# Patient Record
Sex: FEMALE | Race: WHITE | Employment: OTHER | URBAN - NONMETROPOLITAN AREA
[De-identification: names, ages, dates, MRNs, and addresses within clinical notes are randomized per-mention and may not be internally consistent; named-entity substitution may affect disease eponyms.]

---

## 2019-03-21 LAB
CHOLESTEROL, TOTAL: 210 MG/DL
CHOLESTEROL/HDL RATIO: 3.4
CREATININE: 0.8 MG/DL
HDLC SERPL-MCNC: 62 MG/DL (ref 35–70)
LDL CHOLESTEROL CALCULATED: 125 MG/DL (ref 0–160)
POTASSIUM (K+): 4.3
TRIGL SERPL-MCNC: 115 MG/DL
VLDLC SERPL CALC-MCNC: NORMAL MG/DL

## 2019-06-20 ENCOUNTER — OFFICE VISIT (OUTPATIENT)
Dept: FAMILY MEDICINE CLINIC | Age: 74
End: 2019-06-20
Payer: MEDICARE

## 2019-06-20 VITALS
SYSTOLIC BLOOD PRESSURE: 130 MMHG | HEART RATE: 69 BPM | DIASTOLIC BLOOD PRESSURE: 76 MMHG | OXYGEN SATURATION: 98 % | HEIGHT: 65 IN | BODY MASS INDEX: 26.66 KG/M2 | TEMPERATURE: 98.1 F | WEIGHT: 160 LBS

## 2019-06-20 DIAGNOSIS — D64.9 ANEMIA, UNSPECIFIED TYPE: ICD-10-CM

## 2019-06-20 DIAGNOSIS — F32.5 MAJOR DEPRESSIVE DISORDER WITH SINGLE EPISODE, IN REMISSION (HCC): ICD-10-CM

## 2019-06-20 DIAGNOSIS — Z12.39 SCREENING FOR MALIGNANT NEOPLASM OF BREAST: ICD-10-CM

## 2019-06-20 DIAGNOSIS — G25.81 RLS (RESTLESS LEGS SYNDROME): ICD-10-CM

## 2019-06-20 DIAGNOSIS — I38 VHD (VALVULAR HEART DISEASE): ICD-10-CM

## 2019-06-20 DIAGNOSIS — E78.5 HYPERLIPIDEMIA, UNSPECIFIED HYPERLIPIDEMIA TYPE: ICD-10-CM

## 2019-06-20 DIAGNOSIS — K51.90 ULCERATIVE COLITIS WITHOUT COMPLICATIONS, UNSPECIFIED LOCATION (HCC): ICD-10-CM

## 2019-06-20 DIAGNOSIS — I10 ESSENTIAL HYPERTENSION: Primary | ICD-10-CM

## 2019-06-20 PROCEDURE — 99214 OFFICE O/P EST MOD 30 MIN: CPT | Performed by: INTERNAL MEDICINE

## 2019-06-20 RX ORDER — POTASSIUM CHLORIDE 1500 MG/1
20 TABLET, EXTENDED RELEASE ORAL DAILY
Qty: 90 TABLET | Refills: 1 | Status: SHIPPED
Start: 2019-06-20 | End: 2020-03-27 | Stop reason: SDUPTHER

## 2019-06-20 RX ORDER — LISINOPRIL 20 MG/1
20 TABLET ORAL DAILY
COMMUNITY
End: 2019-06-20 | Stop reason: SDUPTHER

## 2019-06-20 RX ORDER — LISINOPRIL 20 MG/1
20 TABLET ORAL DAILY
Qty: 90 TABLET | Refills: 1 | Status: SHIPPED | OUTPATIENT
Start: 2019-06-20 | End: 2019-12-23 | Stop reason: SDUPTHER

## 2019-06-20 RX ORDER — RANITIDINE 150 MG/1
150 TABLET ORAL 2 TIMES DAILY
Qty: 180 TABLET | Refills: 1 | Status: SHIPPED | OUTPATIENT
Start: 2019-06-20 | End: 2019-09-19

## 2019-06-20 RX ORDER — RANITIDINE 150 MG/1
150 TABLET ORAL 2 TIMES DAILY
COMMUNITY
End: 2019-06-20 | Stop reason: SDUPTHER

## 2019-06-20 RX ORDER — ESCITALOPRAM OXALATE 10 MG/1
10 TABLET ORAL DAILY
Qty: 90 TABLET | Refills: 1 | Status: SHIPPED | OUTPATIENT
Start: 2019-06-20 | End: 2019-12-23 | Stop reason: SDUPTHER

## 2019-06-20 RX ORDER — BIOTIN 1 MG
5000 TABLET ORAL
COMMUNITY
End: 2019-09-19

## 2019-06-20 RX ORDER — OXYBUTYNIN CHLORIDE 5 MG/1
5 TABLET ORAL 2 TIMES DAILY
Qty: 180 TABLET | Refills: 1 | Status: SHIPPED
Start: 2019-06-20 | End: 2020-03-27 | Stop reason: SDUPTHER

## 2019-06-20 RX ORDER — IBUPROFEN 200 MG
200 TABLET ORAL EVERY 6 HOURS PRN
COMMUNITY
End: 2019-09-19

## 2019-06-20 RX ORDER — HYDROCHLOROTHIAZIDE 12.5 MG/1
25 CAPSULE, GELATIN COATED ORAL DAILY
Qty: 90 CAPSULE | Refills: 1 | Status: SHIPPED | OUTPATIENT
Start: 2019-06-20 | End: 2019-12-23 | Stop reason: SDUPTHER

## 2019-06-20 RX ORDER — DILTIAZEM HYDROCHLORIDE EXTENDED-RELEASE TABLETS 180 MG/1
180 TABLET, EXTENDED RELEASE ORAL DAILY
Qty: 90 TABLET | Refills: 1 | Status: SHIPPED | OUTPATIENT
Start: 2019-06-20 | End: 2019-12-23 | Stop reason: SDUPTHER

## 2019-06-20 RX ORDER — PRAVASTATIN SODIUM 20 MG
20 TABLET ORAL DAILY
Qty: 90 TABLET | Refills: 1 | Status: SHIPPED
Start: 2019-06-20 | End: 2020-03-27 | Stop reason: SDUPTHER

## 2019-06-20 ASSESSMENT — ENCOUNTER SYMPTOMS
ABDOMINAL PAIN: 0
SINUS PAIN: 0
DIARRHEA: 0
WHEEZING: 0
CONSTIPATION: 0
VOMITING: 0
SHORTNESS OF BREATH: 0
NAUSEA: 0
RHINORRHEA: 0
COUGH: 0
BACK PAIN: 0
BLOOD IN STOOL: 0

## 2019-06-20 ASSESSMENT — PATIENT HEALTH QUESTIONNAIRE - PHQ9
SUM OF ALL RESPONSES TO PHQ QUESTIONS 1-9: 0
SUM OF ALL RESPONSES TO PHQ9 QUESTIONS 1 & 2: 0
1. LITTLE INTEREST OR PLEASURE IN DOING THINGS: 0
2. FEELING DOWN, DEPRESSED OR HOPELESS: 0
SUM OF ALL RESPONSES TO PHQ QUESTIONS 1-9: 0

## 2019-06-20 NOTE — PROGRESS NOTES
[facility]     19  Lizbeth Car : 1945 Sex: female  Age: 75 y.o. Chief Complaint   Patient presents with    Hypertension    Knee Pain   Multiple medical problem follow-up    HPI: Patient presents today for follow-up visit on multiple medical problems. She is up-to-date with GI for her ulcerative colitis and orthopedics. She is status post low back surgery for which she is doing well with. She is having a lot of knee issues and is going to see orthopedics regarding that at some point but states does not wish to have any surgery right now. She does continue to have constipation which is been ongoing problem for her. She is up-to-date with her scoping for her ulcerative colitis. She is also due for EGD upcoming. Blood pressure is been okay. Weight is up about 3 pounds. Review of Systems   Constitutional: Negative for activity change, appetite change, chills, diaphoresis, fatigue, fever and unexpected weight change. HENT: Negative for congestion, postnasal drip, rhinorrhea and sinus pain. Respiratory: Negative for cough, shortness of breath and wheezing. Cardiovascular: Negative for chest pain, palpitations and leg swelling. Gastrointestinal: Negative for abdominal pain, blood in stool, constipation, diarrhea, nausea and vomiting. Endocrine: Negative. Genitourinary: Positive for urgency. Negative for difficulty urinating, dysuria, frequency and hematuria. Musculoskeletal: Positive for arthralgias. Negative for back pain, gait problem and myalgias. Knee pain   Skin: Negative. Allergic/Immunologic: Negative for environmental allergies and immunocompromised state. Neurological: Negative for dizziness, weakness, light-headedness, numbness and headaches. Hematological: Negative. Psychiatric/Behavioral:        Depression has improved. She is on medication. Living with her daughter that is working out well.           REST OF PERTINENT ROS GONE OVER AND WAS NEGATIVE.        Current Outpatient Medications:     ibuprofen (ADVIL;MOTRIN) 200 MG tablet, Take 200 mg by mouth every 6 hours as needed for Pain, Disp: , Rfl:     Biotin 1000 MCG TABS, Take 5,000 mcg by mouth, Disp: , Rfl:     diltiazem (CARDIZEM LA) 180 MG TB24 extended release tablet, Take 1 tablet by mouth daily, Disp: 90 tablet, Rfl: 1    ranitidine (RANITIDINE 150 MAX STRENGTH) 150 MG tablet, Take 1 tablet by mouth 2 times daily, Disp: 180 tablet, Rfl: 1    hydrochlorothiazide (MICROZIDE) 12.5 MG capsule, Take 2 capsules by mouth daily, Disp: 90 capsule, Rfl: 1    lisinopril (PRINIVIL;ZESTRIL) 20 MG tablet, Take 1 tablet by mouth daily, Disp: 90 tablet, Rfl: 1    pravastatin (PRAVACHOL) 20 MG tablet, Take 1 tablet by mouth daily, Disp: 90 tablet, Rfl: 1    KLOR-CON M20 20 MEQ extended release tablet, Take 1 tablet by mouth daily, Disp: 90 tablet, Rfl: 1    oxybutynin (DITROPAN) 5 MG tablet, Take 1 tablet by mouth 2 times daily, Disp: 180 tablet, Rfl: 1    escitalopram (LEXAPRO) 10 MG tablet, Take 1 tablet by mouth daily, Disp: 90 tablet, Rfl: 1    Cholecalciferol (VITAMIN D) 2000 UNITS CAPS capsule, Take 2,000 capsules by mouth daily, Disp: , Rfl:     ALPRAZolam (XANAX) 0.25 MG tablet, Take 0.25 mg by mouth nightly as needed for Sleep, Disp: , Rfl:     nitroGLYCERIN (NITROSTAT) 0.4 MG SL tablet, Place 0.4 mg under the tongue every 5 minutes as needed for Chest pain, Disp: , Rfl:     sulfaSALAzine (AZULFIDINE) 500 MG tablet, Take 500 mg by mouth 4 times daily 2 tablets twice a day, Disp: , Rfl:   Allergies   Allergen Reactions    Erythromycin Hives    Penicillins Hives       Past Medical History:   Diagnosis Date    Anemia     Anemia     Chronic congestion of paranasal sinus     Colon polyps     Depression     Diverticulosis     HTN (hypertension)     Hyperlipidemia 6/20/2019    Irritable bladder     Major depressive disorder with single episode, in remission ([facility] [address] 2019    Osteoarthritis     Overactive bladder     Restless leg syndrome     Restless leg syndrome     RLS (restless legs syndrome) 2019    [facility] [address]     Ul[facility] colitis without complications ([facility]     Vitamin D deficiency     Wrist fracture     Right     Past Surgical History:   Procedure Laterality Date    BLADDER SUSPENSION      BREAST LUMPECTOMY Left     x 2, negative    BUNIONECTOMY      CARDIAC CATHETERIZATION  2012    CARPAL TUNNEL RELEASE Right     CATARACT REMOVAL WITH IMPLANT Right     COLONOSCOPY  2014    HYSTERECTOMY, TOTAL ABDOMINAL      non-cancerous    JOINT REPLACEMENT Bilateral     hip    KNEE ARTHROSCOPY Left     RECTOCELE REPAIR       Family History   Problem Relation Age of Onset    Heart Disease Father          age 80    Diabetes Mother          age 80    Heart Disease Mother         CHF    Other Brother         PVD, obesity    Cancer Paternal Grandmother         breast     Social History     Socioeconomic History    Marital status:      Spouse name: Not on file    Number of children: Not on file    Years of education: Not on file    Highest education level: Not on file   Occupational History    Not on file   Social Needs    Financial resource strain: Not on file    Food insecurity:     Worry: Not on file     Inability: Not on file    Transportation needs:     Medical: Not on file     Non-medical: Not on file   Tobacco Use    Smoking status: Never Smoker    Smokeless tobacco: Never Used   Substance and Sexual Activity    Alcohol use: No     Alcohol/week: 0.0 oz    Drug use: No    Sexual activity: Not on file   Lifestyle    Physical activity:     Days per week: Not on file     Minutes per session: Not on file    Stress: Not on file   Relationships    Social connections:     Talks on phone: Not on file     Gets together: Not on file     Attends Che[employer] service: Not on file     Active member of club or organization: Not on file     Attends meetings of clubs or organizations: Not on file     Relationship status: Not on file    Intimate partner violence:     Fear of current or ex partner: Not on file     Emotionally abused: Not on file     Physically abused: Not on file     Forced sexual activity: Not on file   Other Topics Concern    Not on file   Social History Narrative    Not on file       Vitals:    06/20/19 1042   BP: 130/76   Pulse: 69   Temp: 98.1 °F (36.7 °C)   TempSrc: Temporal   SpO2: 98%   Weight: 160 lb (72.6 kg)   Height: 5' 5\" (1.651 m)       Physical Exam   Constitutional: She is oriented to person, place, and time. She appears well-developed and well-nourished. No distress. Neck: Normal range of motion. Neck supple. No JVD present. No thyromegaly present. Cardiovascular: Normal rate, regular rhythm, normal heart sounds and intact distal pulses. Exam reveals no gallop and no friction rub. No murmur heard. Pulmonary/Chest: Effort normal and breath sounds normal. No respiratory distress. She has no wheezes. She has no rales. Abdominal: Soft. Bowel sounds are normal. She exhibits no distension and no mass. There is no tenderness. Musculoskeletal: Normal range of motion. She exhibits no edema or tenderness. Lymphadenopathy:     She has no cervical adenopathy. Neurological: She is alert and oriented to person, place, and time. She displays normal reflexes. No sensory deficit. She exhibits normal muscle tone. Coordination normal.   Skin: Skin is warm and dry. No rash noted. No erythema. Psychiatric: She has a normal mood and affect. Her behavior is normal.   Nursing note and vitals reviewed. Assessment and Plan:  Katherin Son was seen today for hypertension and knee pain. Diagnoses and all orders for this visit:    Essential hypertension  -     diltiazem (CARDIZEM LA) 180 MG TB24 extended release tablet;  Take 1 tablet by mouth daily  -     hydrochlorothiazide (MICROZIDE) 12.5 MG capsule; Take 2 capsules by mouth daily  -     lisinopril (PRINIVIL;ZESTRIL) 20 MG tablet; Take 1 tablet by mouth daily  -     KLOR-CON M20 20 MEQ extended release tablet; Take 1 tablet by mouth daily  -     oxybutynin (DITROPAN) 5 MG tablet; Take 1 tablet by mouth 2 times daily    Ulcerative colitis without complications, unspecified location ([place] [address].)    Hyperlipidemia, unspecified hyperlipidemia type  -     pravastatin (PRAVACHOL) 20 MG tablet; Take 1 tablet by mouth daily    Major depressive disorder with single episode, in remission ([facility]  -     escitalopram (LEXAPRO) 10 MG tablet; Take 1 tablet by mouth daily    RLS (restless legs syndrome)    Anemia, unspecified type  -     ranitidine (RANITIDINE 150 MAX STRENGTH) 150 MG tablet; Take 1 tablet by mouth 2 times daily    VHD (valvular heart disease)    Screening for malignant neoplasm of breast  -     ADRIANA CAD SCREENING; Future      Plan: I will see her back in 3 months and get fasting blood work on her at that point. Order for mammogram was given. Fall precautions. Follow-up with above consultants GI and orthopedics. Continue to monitor blood pressure closely. She will be due for an EGD this year. This is for Angel's esophagus. Notify us with problems in the interim. increase her Metamucil from daily to twice a day for a while to see if she gets more regular with this. Prescription management performed. No follow-ups on file. Seen By:  Greg Salomon MD      *Document was created using voice recognition software. Note was reviewed however may contain grammatical errors.

## 2019-09-17 RX ORDER — SENNOSIDES 8.6 MG
650 CAPSULE ORAL EVERY 8 HOURS PRN
COMMUNITY
End: 2020-03-27

## 2019-09-19 ENCOUNTER — HOSPITAL ENCOUNTER (OUTPATIENT)
Age: 74
Discharge: HOME | End: 2019-09-21
Payer: MEDICARE

## 2019-09-19 ENCOUNTER — OFFICE VISIT (OUTPATIENT)
Dept: FAMILY MEDICINE CLINIC | Age: 74
End: 2019-09-19
Payer: MEDICARE

## 2019-09-19 VITALS
SYSTOLIC BLOOD PRESSURE: 158 MMHG | HEART RATE: 72 BPM | DIASTOLIC BLOOD PRESSURE: 80 MMHG | BODY MASS INDEX: 26.29 KG/M2 | OXYGEN SATURATION: 98 % | WEIGHT: 158 LBS

## 2019-09-19 DIAGNOSIS — D64.9 ANEMIA, UNSPECIFIED TYPE: ICD-10-CM

## 2019-09-19 DIAGNOSIS — R53.83 FATIGUE, UNSPECIFIED TYPE: ICD-10-CM

## 2019-09-19 DIAGNOSIS — E78.5 HYPERLIPIDEMIA, UNSPECIFIED HYPERLIPIDEMIA TYPE: ICD-10-CM

## 2019-09-19 DIAGNOSIS — Z23 IMMUNIZATION DUE: Primary | ICD-10-CM

## 2019-09-19 DIAGNOSIS — I38 VHD (VALVULAR HEART DISEASE): ICD-10-CM

## 2019-09-19 DIAGNOSIS — G25.81 RLS (RESTLESS LEGS SYNDROME): ICD-10-CM

## 2019-09-19 DIAGNOSIS — K51.90 ULCERATIVE COLITIS WITHOUT COMPLICATIONS, UNSPECIFIED LOCATION (HCC): ICD-10-CM

## 2019-09-19 DIAGNOSIS — I10 ESSENTIAL HYPERTENSION: ICD-10-CM

## 2019-09-19 DIAGNOSIS — F32.5 MAJOR DEPRESSIVE DISORDER WITH SINGLE EPISODE, IN REMISSION (HCC): ICD-10-CM

## 2019-09-19 LAB
ALBUMIN SERPL-MCNC: 4.7 G/DL (ref 3.5–5.2)
ALP BLD-CCNC: 87 U/L (ref 35–104)
ALT SERPL-CCNC: 10 U/L (ref 0–32)
ANION GAP SERPL CALCULATED.3IONS-SCNC: 16 MMOL/L (ref 7–16)
AST SERPL-CCNC: 22 U/L (ref 0–31)
BASOPHILS ABSOLUTE: 0.02 E9/L (ref 0–0.2)
BASOPHILS RELATIVE PERCENT: 0.5 % (ref 0–2)
BILIRUB SERPL-MCNC: <0.2 MG/DL (ref 0–1.2)
BUN BLDV-MCNC: 13 MG/DL (ref 8–23)
CALCIUM SERPL-MCNC: 9.5 MG/DL (ref 8.6–10.2)
CHLORIDE BLD-SCNC: 96 MMOL/L (ref 98–107)
CHOLESTEROL, TOTAL: 200 MG/DL (ref 0–199)
CO2: 25 MMOL/L (ref 22–29)
CREAT SERPL-MCNC: 0.9 MG/DL (ref 0.5–1)
EOSINOPHILS ABSOLUTE: 0.04 E9/L (ref 0.05–0.5)
EOSINOPHILS RELATIVE PERCENT: 1 % (ref 0–6)
GFR AFRICAN AMERICAN: >60
GFR NON-AFRICAN AMERICAN: >60 ML/MIN/1.73
GLUCOSE BLD-MCNC: 88 MG/DL (ref 74–99)
HCT VFR BLD CALC: 36.1 % (ref 34–48)
HDLC SERPL-MCNC: 63 MG/DL
HEMOGLOBIN: 11.8 G/DL (ref 11.5–15.5)
IMMATURE GRANULOCYTES #: 0.01 E9/L
IMMATURE GRANULOCYTES %: 0.3 % (ref 0–5)
LDL CHOLESTEROL CALCULATED: 113 MG/DL (ref 0–99)
LYMPHOCYTES ABSOLUTE: 1.57 E9/L (ref 1.5–4)
LYMPHOCYTES RELATIVE PERCENT: 40.4 % (ref 20–42)
MCH RBC QN AUTO: 32.2 PG (ref 26–35)
MCHC RBC AUTO-ENTMCNC: 32.7 % (ref 32–34.5)
MCV RBC AUTO: 98.6 FL (ref 80–99.9)
MONOCYTES ABSOLUTE: 0.36 E9/L (ref 0.1–0.95)
MONOCYTES RELATIVE PERCENT: 9.3 % (ref 2–12)
NEUTROPHILS ABSOLUTE: 1.89 E9/L (ref 1.8–7.3)
NEUTROPHILS RELATIVE PERCENT: 48.5 % (ref 43–80)
PDW BLD-RTO: 12.6 FL (ref 11.5–15)
PLATELET # BLD: 170 E9/L (ref 130–450)
PMV BLD AUTO: 10.5 FL (ref 7–12)
POTASSIUM SERPL-SCNC: 4.1 MMOL/L (ref 3.5–5)
RBC # BLD: 3.66 E12/L (ref 3.5–5.5)
SODIUM BLD-SCNC: 137 MMOL/L (ref 132–146)
TOTAL PROTEIN: 7.4 G/DL (ref 6.4–8.3)
TRIGL SERPL-MCNC: 121 MG/DL (ref 0–149)
TSH SERPL DL<=0.05 MIU/L-ACNC: 3.08 UIU/ML (ref 0.27–4.2)
VLDLC SERPL CALC-MCNC: 24 MG/DL
WBC # BLD: 3.9 E9/L (ref 4.5–11.5)

## 2019-09-19 PROCEDURE — G0008 ADMIN INFLUENZA VIRUS VAC: HCPCS | Performed by: INTERNAL MEDICINE

## 2019-09-19 PROCEDURE — 80061 LIPID PANEL: CPT

## 2019-09-19 PROCEDURE — 90653 IIV ADJUVANT VACCINE IM: CPT | Performed by: INTERNAL MEDICINE

## 2019-09-19 PROCEDURE — G8427 DOCREV CUR MEDS BY ELIG CLIN: HCPCS | Performed by: INTERNAL MEDICINE

## 2019-09-19 PROCEDURE — G8400 PT W/DXA NO RESULTS DOC: HCPCS | Performed by: INTERNAL MEDICINE

## 2019-09-19 PROCEDURE — 1123F ACP DISCUSS/DSCN MKR DOCD: CPT | Performed by: INTERNAL MEDICINE

## 2019-09-19 PROCEDURE — 85025 COMPLETE CBC W/AUTO DIFF WBC: CPT

## 2019-09-19 PROCEDURE — 1036F TOBACCO NON-USER: CPT | Performed by: INTERNAL MEDICINE

## 2019-09-19 PROCEDURE — 1090F PRES/ABSN URINE INCON ASSESS: CPT | Performed by: INTERNAL MEDICINE

## 2019-09-19 PROCEDURE — 36415 COLL VENOUS BLD VENIPUNCTURE: CPT

## 2019-09-19 PROCEDURE — 84443 ASSAY THYROID STIM HORMONE: CPT

## 2019-09-19 PROCEDURE — 80053 COMPREHEN METABOLIC PANEL: CPT

## 2019-09-19 PROCEDURE — 4040F PNEUMOC VAC/ADMIN/RCVD: CPT | Performed by: INTERNAL MEDICINE

## 2019-09-19 PROCEDURE — G8419 CALC BMI OUT NRM PARAM NOF/U: HCPCS | Performed by: INTERNAL MEDICINE

## 2019-09-19 PROCEDURE — 3017F COLORECTAL CA SCREEN DOC REV: CPT | Performed by: INTERNAL MEDICINE

## 2019-09-19 PROCEDURE — 99214 OFFICE O/P EST MOD 30 MIN: CPT | Performed by: INTERNAL MEDICINE

## 2019-09-19 RX ORDER — POTASSIUM CHLORIDE 1500 MG/1
20 TABLET, FILM COATED, EXTENDED RELEASE ORAL DAILY
COMMUNITY
End: 2019-09-19 | Stop reason: SDUPTHER

## 2019-09-19 RX ORDER — POTASSIUM CHLORIDE 1500 MG/1
20 TABLET, FILM COATED, EXTENDED RELEASE ORAL DAILY
Qty: 90 TABLET | Refills: 1 | Status: SHIPPED
Start: 2019-09-19 | End: 2020-03-27

## 2019-09-21 ENCOUNTER — TELEPHONE (OUTPATIENT)
Dept: FAMILY MEDICINE CLINIC | Age: 74
End: 2019-09-21

## 2019-09-22 NOTE — PROGRESS NOTES
dresser  Personal Habits: Cigarette Use: Does Not Smoke. Alcohol: Denies alcohol use    Current Outpatient Medications:     potassium chloride (KLOR-CON M) 20 MEQ TBCR extended release tablet, Take 1 tablet by mouth daily 1 PO QD -- PLEASE GIVE PT GENERIC INSTEAD OF BRAND, Disp: 90 tablet, Rfl: 1    acetaminophen (TYLENOL) 650 MG extended release tablet, Take 650 mg by mouth every 8 hours as needed for Pain, Disp: , Rfl:     diltiazem (CARDIZEM LA) 180 MG TB24 extended release tablet, Take 1 tablet by mouth daily, Disp: 90 tablet, Rfl: 1    hydrochlorothiazide (MICROZIDE) 12.5 MG capsule, Take 2 capsules by mouth daily, Disp: 90 capsule, Rfl: 1    lisinopril (PRINIVIL;ZESTRIL) 20 MG tablet, Take 1 tablet by mouth daily, Disp: 90 tablet, Rfl: 1    pravastatin (PRAVACHOL) 20 MG tablet, Take 1 tablet by mouth daily, Disp: 90 tablet, Rfl: 1    KLOR-CON M20 20 MEQ extended release tablet, Take 1 tablet by mouth daily, Disp: 90 tablet, Rfl: 1    oxybutynin (DITROPAN) 5 MG tablet, Take 1 tablet by mouth 2 times daily, Disp: 180 tablet, Rfl: 1    escitalopram (LEXAPRO) 10 MG tablet, Take 1 tablet by mouth daily, Disp: 90 tablet, Rfl: 1    Cholecalciferol (VITAMIN D) 2000 UNITS CAPS capsule, Take 2,000 capsules by mouth daily, Disp: , Rfl:     ALPRAZolam (XANAX) 0.25 MG tablet, Take 0.25 mg by mouth nightly as needed for Sleep, Disp: , Rfl:     sulfaSALAzine (AZULFIDINE) 500 MG tablet, Take 1,000 mg by mouth 2 times daily 2 tablets twice a day, Disp: , Rfl:     nitroGLYCERIN (NITROSTAT) 0.4 MG SL tablet, Place 0.4 mg under the tongue every 5 minutes as needed for Chest pain, Disp: , Rfl:   Allergies   Allergen Reactions    Erythromycin Hives    Penicillins Hives       Past Medical History:   Diagnosis Date    Anemia     Anemia     Cholelithiasis     Chronic congestion of paranasal sinus     Colon polyps     Depression     Diastolic dysfunction     Diverticulosis     HTN (hypertension)    

## 2019-10-09 ENCOUNTER — OFFICE VISIT (OUTPATIENT)
Dept: FAMILY MEDICINE CLINIC | Age: 74
End: 2019-10-09
Payer: MEDICARE

## 2019-10-09 VITALS
SYSTOLIC BLOOD PRESSURE: 130 MMHG | WEIGHT: 156 LBS | HEIGHT: 65 IN | DIASTOLIC BLOOD PRESSURE: 70 MMHG | BODY MASS INDEX: 25.99 KG/M2 | OXYGEN SATURATION: 98 % | HEART RATE: 65 BPM | TEMPERATURE: 97.2 F

## 2019-10-09 DIAGNOSIS — E78.5 HYPERLIPIDEMIA, UNSPECIFIED HYPERLIPIDEMIA TYPE: ICD-10-CM

## 2019-10-09 DIAGNOSIS — Z01.818 PREOP EXAMINATION: Primary | ICD-10-CM

## 2019-10-09 DIAGNOSIS — I10 ESSENTIAL HYPERTENSION: ICD-10-CM

## 2019-10-09 DIAGNOSIS — M25.561 CHRONIC PAIN OF RIGHT KNEE: ICD-10-CM

## 2019-10-09 DIAGNOSIS — M17.11 PRIMARY OSTEOARTHRITIS OF RIGHT KNEE: ICD-10-CM

## 2019-10-09 DIAGNOSIS — G89.29 CHRONIC PAIN OF RIGHT KNEE: ICD-10-CM

## 2019-10-09 DIAGNOSIS — K51.90 ULCERATIVE COLITIS WITHOUT COMPLICATIONS, UNSPECIFIED LOCATION (HCC): ICD-10-CM

## 2019-10-09 LAB
BILIRUBIN, POC: NORMAL
BLOOD URINE, POC: NORMAL
CLARITY, POC: YELLOW
COLOR, POC: CLEAR
GLUCOSE URINE, POC: NORMAL
KETONES, POC: NORMAL
LEUKOCYTE EST, POC: NORMAL
NITRITE, POC: NORMAL
PH, POC: 7
PROTEIN, POC: NORMAL
SPECIFIC GRAVITY, POC: 1.02
UROBILINOGEN, POC: 0.2

## 2019-10-09 PROCEDURE — G8419 CALC BMI OUT NRM PARAM NOF/U: HCPCS | Performed by: INTERNAL MEDICINE

## 2019-10-09 PROCEDURE — 81002 URINALYSIS NONAUTO W/O SCOPE: CPT | Performed by: INTERNAL MEDICINE

## 2019-10-09 PROCEDURE — 4040F PNEUMOC VAC/ADMIN/RCVD: CPT | Performed by: INTERNAL MEDICINE

## 2019-10-09 PROCEDURE — 1036F TOBACCO NON-USER: CPT | Performed by: INTERNAL MEDICINE

## 2019-10-09 PROCEDURE — G8400 PT W/DXA NO RESULTS DOC: HCPCS | Performed by: INTERNAL MEDICINE

## 2019-10-09 PROCEDURE — G8427 DOCREV CUR MEDS BY ELIG CLIN: HCPCS | Performed by: INTERNAL MEDICINE

## 2019-10-09 PROCEDURE — G8482 FLU IMMUNIZE ORDER/ADMIN: HCPCS | Performed by: INTERNAL MEDICINE

## 2019-10-09 PROCEDURE — 99214 OFFICE O/P EST MOD 30 MIN: CPT | Performed by: INTERNAL MEDICINE

## 2019-10-09 PROCEDURE — 3017F COLORECTAL CA SCREEN DOC REV: CPT | Performed by: INTERNAL MEDICINE

## 2019-10-09 PROCEDURE — 93000 ELECTROCARDIOGRAM COMPLETE: CPT | Performed by: INTERNAL MEDICINE

## 2019-10-09 PROCEDURE — 1090F PRES/ABSN URINE INCON ASSESS: CPT | Performed by: INTERNAL MEDICINE

## 2019-10-09 PROCEDURE — 1123F ACP DISCUSS/DSCN MKR DOCD: CPT | Performed by: INTERNAL MEDICINE

## 2019-10-18 DIAGNOSIS — Z12.39 SCREENING FOR MALIGNANT NEOPLASM OF BREAST: ICD-10-CM

## 2019-12-05 ENCOUNTER — TELEPHONE (OUTPATIENT)
Dept: FAMILY MEDICINE CLINIC | Age: 74
End: 2019-12-05

## 2019-12-05 RX ORDER — FAMOTIDINE 10 MG
10 TABLET ORAL 2 TIMES DAILY
Qty: 60 TABLET | Refills: 5 | Status: SHIPPED | OUTPATIENT
Start: 2019-12-05 | End: 2019-12-23 | Stop reason: SDUPTHER

## 2019-12-23 ENCOUNTER — OFFICE VISIT (OUTPATIENT)
Dept: FAMILY MEDICINE CLINIC | Age: 74
End: 2019-12-23
Payer: MEDICARE

## 2019-12-23 ENCOUNTER — HOSPITAL ENCOUNTER (OUTPATIENT)
Age: 74
Discharge: HOME | End: 2019-12-25
Payer: MEDICARE

## 2019-12-23 VITALS
BODY MASS INDEX: 25.66 KG/M2 | WEIGHT: 154.2 LBS | DIASTOLIC BLOOD PRESSURE: 82 MMHG | OXYGEN SATURATION: 98 % | SYSTOLIC BLOOD PRESSURE: 136 MMHG | HEART RATE: 80 BPM

## 2019-12-23 DIAGNOSIS — F32.5 MAJOR DEPRESSIVE DISORDER WITH SINGLE EPISODE, IN REMISSION (HCC): ICD-10-CM

## 2019-12-23 DIAGNOSIS — D64.9 ANEMIA, UNSPECIFIED TYPE: ICD-10-CM

## 2019-12-23 DIAGNOSIS — E78.5 HYPERLIPIDEMIA, UNSPECIFIED HYPERLIPIDEMIA TYPE: Primary | ICD-10-CM

## 2019-12-23 DIAGNOSIS — I10 ESSENTIAL HYPERTENSION: ICD-10-CM

## 2019-12-23 DIAGNOSIS — Z96.651 HISTORY OF TOTAL KNEE ARTHROPLASTY, RIGHT: ICD-10-CM

## 2019-12-23 DIAGNOSIS — K21.9 GASTROESOPHAGEAL REFLUX DISEASE WITHOUT ESOPHAGITIS: ICD-10-CM

## 2019-12-23 DIAGNOSIS — K51.90 ULCERATIVE COLITIS WITHOUT COMPLICATIONS, UNSPECIFIED LOCATION (HCC): ICD-10-CM

## 2019-12-23 DIAGNOSIS — G25.81 RLS (RESTLESS LEGS SYNDROME): ICD-10-CM

## 2019-12-23 LAB
ANION GAP SERPL CALCULATED.3IONS-SCNC: 14 MMOL/L (ref 7–16)
BUN BLDV-MCNC: 19 MG/DL (ref 8–23)
CALCIUM SERPL-MCNC: 9.6 MG/DL (ref 8.6–10.2)
CHLORIDE BLD-SCNC: 102 MMOL/L (ref 98–107)
CO2: 25 MMOL/L (ref 22–29)
CREAT SERPL-MCNC: 1 MG/DL (ref 0.5–1)
GFR AFRICAN AMERICAN: >60
GFR NON-AFRICAN AMERICAN: 54 ML/MIN/1.73
GLUCOSE BLD-MCNC: 93 MG/DL (ref 74–99)
HCT VFR BLD CALC: 35.7 % (ref 34–48)
HEMOGLOBIN: 11.2 G/DL (ref 11.5–15.5)
POTASSIUM SERPL-SCNC: 4.5 MMOL/L (ref 3.5–5)
SODIUM BLD-SCNC: 141 MMOL/L (ref 132–146)

## 2019-12-23 PROCEDURE — 80048 BASIC METABOLIC PNL TOTAL CA: CPT

## 2019-12-23 PROCEDURE — 4040F PNEUMOC VAC/ADMIN/RCVD: CPT | Performed by: INTERNAL MEDICINE

## 2019-12-23 PROCEDURE — 85014 HEMATOCRIT: CPT

## 2019-12-23 PROCEDURE — G8417 CALC BMI ABV UP PARAM F/U: HCPCS | Performed by: INTERNAL MEDICINE

## 2019-12-23 PROCEDURE — 1090F PRES/ABSN URINE INCON ASSESS: CPT | Performed by: INTERNAL MEDICINE

## 2019-12-23 PROCEDURE — 1036F TOBACCO NON-USER: CPT | Performed by: INTERNAL MEDICINE

## 2019-12-23 PROCEDURE — 3017F COLORECTAL CA SCREEN DOC REV: CPT | Performed by: INTERNAL MEDICINE

## 2019-12-23 PROCEDURE — G8400 PT W/DXA NO RESULTS DOC: HCPCS | Performed by: INTERNAL MEDICINE

## 2019-12-23 PROCEDURE — 1123F ACP DISCUSS/DSCN MKR DOCD: CPT | Performed by: INTERNAL MEDICINE

## 2019-12-23 PROCEDURE — 85018 HEMOGLOBIN: CPT

## 2019-12-23 PROCEDURE — G8482 FLU IMMUNIZE ORDER/ADMIN: HCPCS | Performed by: INTERNAL MEDICINE

## 2019-12-23 PROCEDURE — G8427 DOCREV CUR MEDS BY ELIG CLIN: HCPCS | Performed by: INTERNAL MEDICINE

## 2019-12-23 PROCEDURE — 36415 COLL VENOUS BLD VENIPUNCTURE: CPT

## 2019-12-23 PROCEDURE — 99214 OFFICE O/P EST MOD 30 MIN: CPT | Performed by: INTERNAL MEDICINE

## 2019-12-23 RX ORDER — DILTIAZEM HYDROCHLORIDE EXTENDED-RELEASE TABLETS 180 MG/1
180 TABLET, EXTENDED RELEASE ORAL DAILY
Qty: 90 TABLET | Refills: 1 | Status: SHIPPED | OUTPATIENT
Start: 2019-12-23 | End: 2020-01-02

## 2019-12-23 RX ORDER — LISINOPRIL 20 MG/1
20 TABLET ORAL DAILY
Qty: 90 TABLET | Refills: 1 | Status: SHIPPED
Start: 2019-12-23 | End: 2020-06-22 | Stop reason: SDUPTHER

## 2019-12-23 RX ORDER — HYDROCHLOROTHIAZIDE 12.5 MG/1
12.5 CAPSULE, GELATIN COATED ORAL DAILY
Qty: 90 CAPSULE | Refills: 1 | Status: SHIPPED
Start: 2019-12-23 | End: 2020-03-27 | Stop reason: SDUPTHER

## 2019-12-23 RX ORDER — SULFASALAZINE 500 MG/1
1000 TABLET ORAL 2 TIMES DAILY
Qty: 180 TABLET | Refills: 1 | Status: SHIPPED
Start: 2019-12-23 | End: 2020-03-27 | Stop reason: SDUPTHER

## 2019-12-23 RX ORDER — ESCITALOPRAM OXALATE 10 MG/1
10 TABLET ORAL DAILY
Qty: 90 TABLET | Refills: 1 | Status: SHIPPED
Start: 2019-12-23 | End: 2020-06-22 | Stop reason: SDUPTHER

## 2019-12-23 RX ORDER — FAMOTIDINE 10 MG
10 TABLET ORAL 2 TIMES DAILY
Qty: 180 TABLET | Refills: 1 | Status: SHIPPED
Start: 2019-12-23 | End: 2020-06-22 | Stop reason: SDUPTHER

## 2019-12-24 ENCOUNTER — TELEPHONE (OUTPATIENT)
Dept: FAMILY MEDICINE CLINIC | Age: 74
End: 2019-12-24

## 2020-01-02 RX ORDER — DILTIAZEM HYDROCHLORIDE 180 MG/1
180 CAPSULE, EXTENDED RELEASE ORAL DAILY
COMMUNITY
End: 2020-01-02 | Stop reason: SDUPTHER

## 2020-01-02 RX ORDER — DILTIAZEM HYDROCHLORIDE 180 MG/1
180 CAPSULE, EXTENDED RELEASE ORAL DAILY
Qty: 90 CAPSULE | Refills: 1 | Status: SHIPPED
Start: 2020-01-02 | End: 2020-06-22 | Stop reason: SDUPTHER

## 2020-01-02 NOTE — TELEPHONE ENCOUNTER
flores harper calling we sent in on 12/23/19 generic matzim LA tabs. That is not covered. Asking for the alternative diltiazem ER 180mg Caps be sent instead. rx pended for review.   Call back number if needed [phone]  Order# [id number]

## 2020-01-10 ENCOUNTER — OFFICE VISIT (OUTPATIENT)
Dept: FAMILY MEDICINE CLINIC | Age: 75
End: 2020-01-10
Payer: MEDICARE

## 2020-01-10 VITALS
BODY MASS INDEX: 25.61 KG/M2 | OXYGEN SATURATION: 97 % | TEMPERATURE: 97.4 F | RESPIRATION RATE: 18 BRPM | DIASTOLIC BLOOD PRESSURE: 64 MMHG | SYSTOLIC BLOOD PRESSURE: 130 MMHG | HEART RATE: 66 BPM | WEIGHT: 150 LBS | HEIGHT: 64 IN

## 2020-01-10 PROCEDURE — 4040F PNEUMOC VAC/ADMIN/RCVD: CPT | Performed by: PHYSICIAN ASSISTANT

## 2020-01-10 PROCEDURE — 3017F COLORECTAL CA SCREEN DOC REV: CPT | Performed by: PHYSICIAN ASSISTANT

## 2020-01-10 PROCEDURE — 1123F ACP DISCUSS/DSCN MKR DOCD: CPT | Performed by: PHYSICIAN ASSISTANT

## 2020-01-10 PROCEDURE — 1090F PRES/ABSN URINE INCON ASSESS: CPT | Performed by: PHYSICIAN ASSISTANT

## 2020-01-10 PROCEDURE — G8400 PT W/DXA NO RESULTS DOC: HCPCS | Performed by: PHYSICIAN ASSISTANT

## 2020-01-10 PROCEDURE — G8482 FLU IMMUNIZE ORDER/ADMIN: HCPCS | Performed by: PHYSICIAN ASSISTANT

## 2020-01-10 PROCEDURE — 1036F TOBACCO NON-USER: CPT | Performed by: PHYSICIAN ASSISTANT

## 2020-01-10 PROCEDURE — 99213 OFFICE O/P EST LOW 20 MIN: CPT | Performed by: PHYSICIAN ASSISTANT

## 2020-01-10 PROCEDURE — G8427 DOCREV CUR MEDS BY ELIG CLIN: HCPCS | Performed by: PHYSICIAN ASSISTANT

## 2020-01-10 PROCEDURE — G8417 CALC BMI ABV UP PARAM F/U: HCPCS | Performed by: PHYSICIAN ASSISTANT

## 2020-01-10 NOTE — PROGRESS NOTES
1/10/20  Gabe Pardo : 1945 Sex: female  Age 75 y.o. Subjective:  Chief Complaint   Patient presents with    Drainage         HPI:   Gabe Pardo , 75 y.o. female presents to express care for evaluation of sinus drainage. Patient states she has sinus drainage year-round. She denies that it is changed recently. She denies any sinus headache. She denies any fever or chills. She admits to slight sinus drainage but states it has not changed recently. She denies any cough. She denies sore throat. Patient has been using her Flonase but states she does not get significant relief with her Flonase. Patient states her main concern for coming in today for her sinus drainage is that she had a knee replacement in the fall 2019. The knee has been hurting her and she is concerned about a possible infection in her knee coming from her sinuses. Again patient denies that her sinus drainage has changed in any character in the last several months. She denies that her right knee has become swollen or red. Patient states she has been doing more walking and therapy on her knee and she believes that may be what is causing the knee to hurt but she again is concerned about a possible sinus infection causing her knee pain as she has heard  that is possible with artificial joints. Patient states her orthopedic was Dr. Carmen Palencia in [place] but he is currently out of the country. She is uncertain who is covering for him during this time. Patient denies other symptoms. Patient specifically denies any nausea vomiting lightheaded dizziness chest pain or shortness of breath worsening sinus congestion or drainage. Pt denies other symptoms and presents for evaluation.       ROS:   Unless otherwise stated in this report the patient's positive and negative responses for review of systems for constitutional, eyes, ENT, cardiovascular, respiratory, gastrointestinal, neurological, , musculoskeletal, and integument 2 tablets by mouth 2 times daily 2 tablets twice a day, Disp: 180 tablet, Rfl: 1    lisinopril (PRINIVIL;ZESTRIL) 20 MG tablet, Take 1 tablet by mouth daily, Disp: 90 tablet, Rfl: 1    famotidine (PEPCID) 10 MG tablet, Take 1 tablet by mouth 2 times daily, Disp: 180 tablet, Rfl: 1    potassium chloride (KLOR-CON M) 20 MEQ TBCR extended release tablet, Take 1 tablet by mouth daily 1 PO QD -- PLEASE GIVE PT GENERIC INSTEAD OF BRAND, Disp: 90 tablet, Rfl: 1    acetaminophen (TYLENOL) 650 MG extended release tablet, Take 650 mg by mouth every 8 hours as needed for Pain, Disp: , Rfl:     pravastatin (PRAVACHOL) 20 MG tablet, Take 1 tablet by mouth daily, Disp: 90 tablet, Rfl: 1    KLOR-CON M20 20 MEQ extended release tablet, Take 1 tablet by mouth daily, Disp: 90 tablet, Rfl: 1    oxybutynin (DITROPAN) 5 MG tablet, Take 1 tablet by mouth 2 times daily, Disp: 180 tablet, Rfl: 1    Cholecalciferol (VITAMIN D) 2000 UNITS CAPS capsule, Take 2,000 capsules by mouth daily, Disp: , Rfl:     ALPRAZolam (XANAX) 0.25 MG tablet, Take 0.25 mg by mouth nightly as needed for Sleep, Disp: , Rfl:     nitroGLYCERIN (NITROSTAT) 0.4 MG SL tablet, Place 0.4 mg under the tongue every 5 minutes as needed for Chest pain, Disp: , Rfl:     Allergies: Allergies   Allergen Reactions    Erythromycin Hives    Penicillin G     Acetaminophen     Azithromycin     Penicillins Hives    Propoxyphene        Social History:     Social History     Tobacco Use    Smoking status: Never Smoker    Smokeless tobacco: Never Used   Substance Use Topics    Alcohol use: No     Alcohol/week: 0.0 standard drinks    Drug use: No       Physical Exam:     Vitals:    01/10/20 1136   BP: 130/64   Pulse: 66   Resp: 18   Temp: 97.4 °F (36.3 °C)   TempSrc: Temporal   SpO2: 97%   Weight: 150 lb (68 kg)   Height: 5' 4\" (1.626 m)         Exam:  Const: Appears healthy and well developed. No signs of acute distress present.   Vitals reviewed per

## 2020-03-19 ENCOUNTER — TELEPHONE (OUTPATIENT)
Dept: ADMINISTRATIVE | Age: 75
End: 2020-03-19

## 2020-03-27 ENCOUNTER — OFFICE VISIT (OUTPATIENT)
Dept: FAMILY MEDICINE CLINIC | Age: 75
End: 2020-03-27
Payer: MEDICARE

## 2020-03-27 ENCOUNTER — HOSPITAL ENCOUNTER (OUTPATIENT)
Age: 75
Discharge: HOME | End: 2020-03-29
Payer: MEDICARE

## 2020-03-27 LAB
ALBUMIN SERPL-MCNC: 4.6 G/DL (ref 3.5–5.2)
ALP BLD-CCNC: 84 U/L (ref 35–104)
ALT SERPL-CCNC: 7 U/L (ref 0–32)
ANION GAP SERPL CALCULATED.3IONS-SCNC: 13 MMOL/L (ref 7–16)
AST SERPL-CCNC: 18 U/L (ref 0–31)
BASOPHILS ABSOLUTE: 0.02 E9/L (ref 0–0.2)
BASOPHILS RELATIVE PERCENT: 0.5 % (ref 0–2)
BILIRUB SERPL-MCNC: <0.2 MG/DL (ref 0–1.2)
BUN BLDV-MCNC: 22 MG/DL (ref 8–23)
CALCIUM SERPL-MCNC: 9.5 MG/DL (ref 8.6–10.2)
CHLORIDE BLD-SCNC: 98 MMOL/L (ref 98–107)
CHOLESTEROL, TOTAL: 175 MG/DL (ref 0–199)
CO2: 26 MMOL/L (ref 22–29)
CREAT SERPL-MCNC: 0.9 MG/DL (ref 0.5–1)
EOSINOPHILS ABSOLUTE: 0.06 E9/L (ref 0.05–0.5)
EOSINOPHILS RELATIVE PERCENT: 1.4 % (ref 0–6)
GFR AFRICAN AMERICAN: >60
GFR NON-AFRICAN AMERICAN: >60 ML/MIN/1.73
GLUCOSE BLD-MCNC: 79 MG/DL (ref 74–99)
HCT VFR BLD CALC: 36.5 % (ref 34–48)
HDLC SERPL-MCNC: 58 MG/DL
HEMOGLOBIN: 11.5 G/DL (ref 11.5–15.5)
IMMATURE GRANULOCYTES #: 0.01 E9/L
IMMATURE GRANULOCYTES %: 0.2 % (ref 0–5)
LDL CHOLESTEROL CALCULATED: 95 MG/DL (ref 0–99)
LYMPHOCYTES ABSOLUTE: 1.74 E9/L (ref 1.5–4)
LYMPHOCYTES RELATIVE PERCENT: 40.6 % (ref 20–42)
MCH RBC QN AUTO: 29.8 PG (ref 26–35)
MCHC RBC AUTO-ENTMCNC: 31.5 % (ref 32–34.5)
MCV RBC AUTO: 94.6 FL (ref 80–99.9)
MONOCYTES ABSOLUTE: 0.45 E9/L (ref 0.1–0.95)
MONOCYTES RELATIVE PERCENT: 10.5 % (ref 2–12)
NEUTROPHILS ABSOLUTE: 2.01 E9/L (ref 1.8–7.3)
NEUTROPHILS RELATIVE PERCENT: 46.8 % (ref 43–80)
PDW BLD-RTO: 13.9 FL (ref 11.5–15)
PLATELET # BLD: 175 E9/L (ref 130–450)
PMV BLD AUTO: 10.8 FL (ref 7–12)
POTASSIUM SERPL-SCNC: 4.4 MMOL/L (ref 3.5–5)
RBC # BLD: 3.86 E12/L (ref 3.5–5.5)
SODIUM BLD-SCNC: 137 MMOL/L (ref 132–146)
TOTAL PROTEIN: 7.2 G/DL (ref 6.4–8.3)
TRIGL SERPL-MCNC: 112 MG/DL (ref 0–149)
VLDLC SERPL CALC-MCNC: 22 MG/DL
WBC # BLD: 4.3 E9/L (ref 4.5–11.5)

## 2020-03-27 PROCEDURE — 99214 OFFICE O/P EST MOD 30 MIN: CPT | Performed by: INTERNAL MEDICINE

## 2020-03-27 PROCEDURE — 1090F PRES/ABSN URINE INCON ASSESS: CPT | Performed by: INTERNAL MEDICINE

## 2020-03-27 PROCEDURE — 85025 COMPLETE CBC W/AUTO DIFF WBC: CPT

## 2020-03-27 PROCEDURE — 36415 COLL VENOUS BLD VENIPUNCTURE: CPT

## 2020-03-27 PROCEDURE — G8482 FLU IMMUNIZE ORDER/ADMIN: HCPCS | Performed by: INTERNAL MEDICINE

## 2020-03-27 PROCEDURE — 1036F TOBACCO NON-USER: CPT | Performed by: INTERNAL MEDICINE

## 2020-03-27 PROCEDURE — 4040F PNEUMOC VAC/ADMIN/RCVD: CPT | Performed by: INTERNAL MEDICINE

## 2020-03-27 PROCEDURE — G8417 CALC BMI ABV UP PARAM F/U: HCPCS | Performed by: INTERNAL MEDICINE

## 2020-03-27 PROCEDURE — 80053 COMPREHEN METABOLIC PANEL: CPT

## 2020-03-27 PROCEDURE — 1123F ACP DISCUSS/DSCN MKR DOCD: CPT | Performed by: INTERNAL MEDICINE

## 2020-03-27 PROCEDURE — 80061 LIPID PANEL: CPT

## 2020-03-27 PROCEDURE — G8400 PT W/DXA NO RESULTS DOC: HCPCS | Performed by: INTERNAL MEDICINE

## 2020-03-27 PROCEDURE — G8427 DOCREV CUR MEDS BY ELIG CLIN: HCPCS | Performed by: INTERNAL MEDICINE

## 2020-03-27 RX ORDER — OXYBUTYNIN CHLORIDE 5 MG/1
5 TABLET ORAL 2 TIMES DAILY
Qty: 180 TABLET | Refills: 1 | Status: SHIPPED
Start: 2020-03-27 | End: 2020-09-23 | Stop reason: ALTCHOICE

## 2020-03-27 RX ORDER — FAMOTIDINE, CALCIUM CARBONATE AND MAGNESIUM HYDROXIDE 10; 165; 800 MG/1; MG/1; MG/1
TABLET, CHEWABLE ORAL DAILY
COMMUNITY
End: 2020-06-22 | Stop reason: ALTCHOICE

## 2020-03-27 RX ORDER — POTASSIUM CHLORIDE 1500 MG/1
20 TABLET, EXTENDED RELEASE ORAL DAILY
Qty: 90 TABLET | Refills: 1 | Status: SHIPPED
Start: 2020-03-27 | End: 2020-04-07

## 2020-03-27 RX ORDER — PRAVASTATIN SODIUM 20 MG
20 TABLET ORAL DAILY
Qty: 90 TABLET | Refills: 1 | Status: SHIPPED
Start: 2020-03-27 | End: 2020-09-23 | Stop reason: SDUPTHER

## 2020-03-27 RX ORDER — SULFASALAZINE 500 MG/1
1000 TABLET ORAL 2 TIMES DAILY
Qty: 180 TABLET | Refills: 1 | Status: SHIPPED
Start: 2020-03-27 | End: 2020-06-22 | Stop reason: SDUPTHER

## 2020-03-27 RX ORDER — HYDROCHLOROTHIAZIDE 12.5 MG/1
12.5 CAPSULE, GELATIN COATED ORAL DAILY
Qty: 90 CAPSULE | Refills: 1 | Status: SHIPPED
Start: 2020-03-27 | End: 2020-09-23 | Stop reason: SDUPTHER

## 2020-03-27 RX ORDER — ACETAMINOPHEN/DIPHENHYDRAMINE 500MG-25MG
2 TABLET ORAL NIGHTLY PRN
COMMUNITY
End: 2021-08-02 | Stop reason: ALTCHOICE

## 2020-03-28 ENCOUNTER — TELEPHONE (OUTPATIENT)
Dept: FAMILY MEDICINE CLINIC | Age: 75
End: 2020-03-28

## 2020-03-29 VITALS
SYSTOLIC BLOOD PRESSURE: 122 MMHG | HEIGHT: 64 IN | WEIGHT: 154.8 LBS | BODY MASS INDEX: 26.43 KG/M2 | HEART RATE: 60 BPM | TEMPERATURE: 97.4 F | DIASTOLIC BLOOD PRESSURE: 68 MMHG | OXYGEN SATURATION: 100 %

## 2020-03-29 NOTE — PROGRESS NOTES
[address] [facility] [address] [facility]     3/29/20  Ginny Riddle : 1945 Sex: female  Age: 67 y.o. Chief Complaint   Patient presents with    Hyperlipidemia       HPI    Patient presents today for 3-month follow-up visit on her multiple medical problems. Generally she states she is been doing reasonably well. She is having a lot of allergy/sinus type symptoms I did recommend she use nasal steroid. Tells me she has been checking blood pressures and they have been in a good range. She is doing well from an orthopedic standpoint postop right knee has healed and is doing well her lipid status is stable. Her ulcerative colitis has been quiescent. She follows with GI and orthopedics    Review of Systems     Constitutional: Negative for activity change, appetite change, chills, diaphoresis, , fever and unexpected weight change.    HENT: Negative for congestion, postnasal drip, rhinorrhea and sinus pain.    Respiratory: Negative for cough, shortness of breath and wheezing.    Cardiovascular: Negative for chest pain, palpitations and leg swelling. Gastrointestinal: Negative for abdominal pain, blood in stool, constipation, diarrhea, nausea and vomiting. Endocrine: Negative.    Genitourinary: Positive for urgency. Negative for difficulty urinating, dysuria, frequency and hematuria. Musculoskeletal: Positive for arthralgias. Negative for back pain, gait problem and myalgias.       Skin: Negative.    Allergic/Immunologic: Negative for environmental allergies and immunocompromised state. Neurological: Negative for dizziness, weakness, light-headedness, numbness and headaches. Hematological: Negative.    Psychiatric/Behavioral:        Depression has improved.  She is on medication.  Living with her daughter that is working out well       REST OF PERTINENT ROS GONE OVER AND WAS NEGATIVE.      PMH:  Health Maintenance:  Mammogram - (2018)  Mammogram Screening - (2018)  Influenza Vaccination - of club or organization: Not on file     Attends meetings of clubs or organizations: Not on file     Relationship status: Not on file    Intimate partner violence     Fear of current or ex partner: Not on file     Emotionally abused: Not on file     Physically abused: Not on file     Forced sexual activity: Not on file   Other Topics Concern    Not on file   Social History Narrative    Not on file       Vitals:    03/27/20 1325 03/29/20 1629   BP: 122/68    Pulse: (!) 49 60   Temp: 97.4 °F (36.3 °C)    TempSrc: Temporal    SpO2: 100%    Weight: 154 lb 12.8 oz (70.2 kg)    Height: 5' 4\" (1.626 m)        Physical Exam    Constitutional: She is oriented to person, place, and time. She appears well-developed and well-nourished. No distress.   Neck: Normal range of motion. Neck supple. No JVD present. No thyromegaly present. Cardiovascular: Normal rate, regular rhythm, normal heart sounds and intact distal pulses. Exam reveals no gallop and no friction rub.   No murmur heard. Pulmonary/Chest: Effort normal and breath sounds normal. No respiratory distress. She has no wheezes. She has no rales. Abdominal: Soft. Bowel sounds are normal. She exhibits no distension and no mass. There is no tenderness. Musculoskeletal: Normal range of motion. She exhibits no edema.    Did not examine the right knee at this time. Lymph nodes:     She has no cervical supraclavicular axillary or inguinal adenopathy. Neurological: She is alert and oriented to person, place, and time. She displays normal reflexes. No sensory deficit. She exhibits normal muscle tone. Coordination normal.   Skin: Skin is warm and dry. No rash noted. No erythema. Psychiatric: She has a normal mood and affect. Her behavior is normal.   Nursing note and vitals reviewed. Assessment and Plan:  Amaya Sen was seen today for hyperlipidemia.     Diagnoses and all orders for this visit:    Major depressive disorder with single episode, in remission [facility])    Essential hypertension  -     oxybutynin (DITROPAN) 5 MG tablet; Take 1 tablet by mouth 2 times daily  -     hydrochlorothiazide (MICROZIDE) 12.5 MG capsule; Take 1 capsule by mouth daily  -     KLOR-CON M20 20 MEQ extended release tablet; Take 1 tablet by mouth daily  -     CBC Auto Differential; Future  -     Comprehensive Metabolic Panel; Future    Hyperlipidemia, unspecified hyperlipidemia type  -     pravastatin (PRAVACHOL) 20 MG tablet; Take 1 tablet by mouth daily  -     Lipid Panel; Future    Ulcerative colitis without complications, unspecified location (HCC)  -     sulfaSALAzine (AZULFIDINE) 500 MG tablet; Take 2 tablets by mouth 2 times daily 2 tablets twice a day    RLS (restless legs syndrome)      Plan: Blood work today to monitor disease progression medication use. Prescription management performed. Continue to monitor blood pressure closely. Fall precautions. Follow-up with above consultants. Colonoscopy due 2021. Last 2D echo 9/15. Look over this when I see her next. Return in about 3 months (around 6/27/2020). Seen By:  Murray Whitten MD      *Document was created using voice recognition software. Note was reviewed however may contain grammatical errors.

## 2020-04-07 ENCOUNTER — TELEPHONE (OUTPATIENT)
Dept: FAMILY MEDICINE CLINIC | Age: 75
End: 2020-04-07

## 2020-04-07 RX ORDER — POTASSIUM CHLORIDE 20 MEQ/1
20 TABLET, EXTENDED RELEASE ORAL DAILY
Qty: 90 TABLET | Refills: 1 | Status: SHIPPED
Start: 2020-04-07 | End: 2020-09-23 | Stop reason: SDUPTHER

## 2020-06-22 ENCOUNTER — OFFICE VISIT (OUTPATIENT)
Dept: FAMILY MEDICINE CLINIC | Age: 75
End: 2020-06-22
Payer: MEDICARE

## 2020-06-22 ENCOUNTER — TELEPHONE (OUTPATIENT)
Dept: FAMILY MEDICINE CLINIC | Age: 75
End: 2020-06-22

## 2020-06-22 ENCOUNTER — HOSPITAL ENCOUNTER (OUTPATIENT)
Age: 75
Discharge: HOME | End: 2020-06-24
Payer: MEDICARE

## 2020-06-22 VITALS
BODY MASS INDEX: 26.02 KG/M2 | SYSTOLIC BLOOD PRESSURE: 112 MMHG | WEIGHT: 156.2 LBS | DIASTOLIC BLOOD PRESSURE: 66 MMHG | TEMPERATURE: 97.9 F | HEART RATE: 64 BPM | HEIGHT: 65 IN | OXYGEN SATURATION: 98 %

## 2020-06-22 LAB
BILIRUBIN URINE: NEGATIVE
BLOOD, URINE: NEGATIVE
CLARITY: CLEAR
COLOR: YELLOW
GLUCOSE URINE: NEGATIVE MG/DL
KETONES, URINE: NEGATIVE MG/DL
LEUKOCYTE ESTERASE, URINE: NEGATIVE
NITRITE, URINE: NEGATIVE
PH UA: 5.5 (ref 5–9)
PROTEIN UA: NEGATIVE MG/DL
SPECIFIC GRAVITY UA: 1.01 (ref 1–1.03)
UROBILINOGEN, URINE: 0.2 E.U./DL

## 2020-06-22 PROCEDURE — 87088 URINE BACTERIA CULTURE: CPT

## 2020-06-22 PROCEDURE — 1036F TOBACCO NON-USER: CPT | Performed by: INTERNAL MEDICINE

## 2020-06-22 PROCEDURE — 81003 URINALYSIS AUTO W/O SCOPE: CPT | Performed by: INTERNAL MEDICINE

## 2020-06-22 PROCEDURE — G8417 CALC BMI ABV UP PARAM F/U: HCPCS | Performed by: INTERNAL MEDICINE

## 2020-06-22 PROCEDURE — 4040F PNEUMOC VAC/ADMIN/RCVD: CPT | Performed by: INTERNAL MEDICINE

## 2020-06-22 PROCEDURE — 1090F PRES/ABSN URINE INCON ASSESS: CPT | Performed by: INTERNAL MEDICINE

## 2020-06-22 PROCEDURE — 81003 URINALYSIS AUTO W/O SCOPE: CPT

## 2020-06-22 PROCEDURE — G8427 DOCREV CUR MEDS BY ELIG CLIN: HCPCS | Performed by: INTERNAL MEDICINE

## 2020-06-22 PROCEDURE — 99214 OFFICE O/P EST MOD 30 MIN: CPT | Performed by: INTERNAL MEDICINE

## 2020-06-22 PROCEDURE — 1123F ACP DISCUSS/DSCN MKR DOCD: CPT | Performed by: INTERNAL MEDICINE

## 2020-06-22 PROCEDURE — G8400 PT W/DXA NO RESULTS DOC: HCPCS | Performed by: INTERNAL MEDICINE

## 2020-06-22 RX ORDER — DILTIAZEM HYDROCHLORIDE 180 MG/1
180 CAPSULE, EXTENDED RELEASE ORAL DAILY
Qty: 90 CAPSULE | Refills: 1 | Status: SHIPPED
Start: 2020-06-22 | End: 2020-11-30 | Stop reason: SDUPTHER

## 2020-06-22 RX ORDER — SULFASALAZINE 500 MG/1
1000 TABLET ORAL 2 TIMES DAILY
Qty: 180 TABLET | Refills: 1 | Status: SHIPPED
Start: 2020-06-22 | End: 2020-11-30 | Stop reason: ALTCHOICE

## 2020-06-22 RX ORDER — FAMOTIDINE 10 MG
10 TABLET ORAL 2 TIMES DAILY
Qty: 180 TABLET | Refills: 1 | Status: SHIPPED
Start: 2020-06-22 | End: 2020-09-23 | Stop reason: ALTCHOICE

## 2020-06-22 RX ORDER — LISINOPRIL 20 MG/1
20 TABLET ORAL DAILY
Qty: 90 TABLET | Refills: 1 | Status: SHIPPED
Start: 2020-06-22 | End: 2020-11-30 | Stop reason: SDUPTHER

## 2020-06-22 RX ORDER — ESCITALOPRAM OXALATE 10 MG/1
10 TABLET ORAL DAILY
Qty: 90 TABLET | Refills: 1 | Status: SHIPPED
Start: 2020-06-22 | End: 2020-11-30 | Stop reason: SDUPTHER

## 2020-06-22 RX ORDER — ANTIARTHRITIC COMBINATION NO.2 900 MG
TABLET ORAL DAILY
COMMUNITY
End: 2022-10-24 | Stop reason: CLARIF

## 2020-06-23 NOTE — PROGRESS NOTES
[facility]     20  Jessica Rivera : 1945 Sex: female  Age: 67 y.o. Chief Complaint   Patient presents with    Hyperlipidemia    Depression       HPI   Presents today for 3-month follow-up visit on her multiple medical problems. I looked over last note. She tells me her blood pressures have been in good range. This is been stable on statin medication. Depression is stable on SSRI. Ulcerative colitis is stable. Does follow with GI. Patient knee postop has healed well. Still does not quite have the range of motion she would like but is better. She is now complaining of urinary frequency with some dysuria last 3 weeks. Denies hematuria denies fever chills denies back pain with this. She follows with GI and orthopedics    Review of Systems   Constitutional: Negative for activity change, appetite change, chills, diaphoresis, , fever and unexpected weight change.    HENT: Negative for congestion, postnasal drip, rhinorrhea and sinus pain.    Respiratory: Negative for cough, shortness of breath and wheezing.    Cardiovascular: Negative for chest pain, palpitations and leg swelling. Gastrointestinal: Negative for abdominal pain, blood in stool, constipation, diarrhea, nausea and vomiting. Does have history of ulcerative colitis. Endocrine: Negative.    Genitourinary: Positive for urgency frequency. Negative for difficulty urinating, dysuria,  and hematuria. Musculoskeletal: Positive for arthralgias. Negative for back pain, gait problem and myalgias.       Skin: Negative.    Allergic/Immunologic: Negative for environmental allergies and immunocompromised state. Neurological: Negative for dizziness, weakness, light-headedness, numbness and headaches.    Hematological: Negative.    Psychiatric/Behavioral:        Depression has improved.  She is on medication. Christel Fothergill with her daughter that is working out well  Things have been a little bit more trying with recent COVID-19 use           Current Outpatient Medications:     Biotin 5000 MCG CAPS, Take by mouth daily, Disp: , Rfl:     sulfaSALAzine (AZULFIDINE) 500 MG tablet, Take 2 tablets by mouth 2 times daily 2 tablets twice a day, Disp: 180 tablet, Rfl: 1    escitalopram (LEXAPRO) 10 MG tablet, Take 1 tablet by mouth daily, Disp: 90 tablet, Rfl: 1    lisinopril (PRINIVIL;ZESTRIL) 20 MG tablet, Take 1 tablet by mouth daily, Disp: 90 tablet, Rfl: 1    dilTIAZem (DILACOR XR) 180 MG extended release capsule, Take 1 capsule by mouth daily, Disp: 90 capsule, Rfl: 1    famotidine (PEPCID) 10 MG tablet, Take 1 tablet by mouth 2 times daily, Disp: 180 tablet, Rfl: 1    potassium chloride (KLOR-CON M) 20 MEQ extended release tablet, Take 1 tablet by mouth daily, Disp: 90 tablet, Rfl: 1    diphenhydrAMINE-APAP, sleep, (TYLENOL PM EXTRA STRENGTH)  MG tablet, Take 1 tablet by mouth nightly as needed for Sleep, Disp: , Rfl:     oxybutynin (DITROPAN) 5 MG tablet, Take 1 tablet by mouth 2 times daily, Disp: 180 tablet, Rfl: 1    hydrochlorothiazide (MICROZIDE) 12.5 MG capsule, Take 1 capsule by mouth daily, Disp: 90 capsule, Rfl: 1    pravastatin (PRAVACHOL) 20 MG tablet, Take 1 tablet by mouth daily, Disp: 90 tablet, Rfl: 1    Cholecalciferol (VITAMIN D) 2000 UNITS CAPS capsule, Take 2,000 capsules by mouth daily, Disp: , Rfl:     nitroGLYCERIN (NITROSTAT) 0.4 MG SL tablet, Place 0.4 mg under the tongue every 5 minutes as needed for Chest pain, Disp: , Rfl:   Allergies   Allergen Reactions    Erythromycin Hives    Penicillin G     Azithromycin     Penicillins Hives    Propoxyphene        Past Medical History:   Diagnosis Date    Anemia     Anemia     Cholelithiasis     Chronic congestion of paranasal sinus     Colon polyps     Depression     Diastolic dysfunction     Diverticulosis     HTN (hypertension)     Hyperlipidemia 6/20/2019    Irritable bladder     Lumbar degenerative disc disease     Lumbar herniated Social connections     Talks on phone: Not on file     Gets together: Not on file     Attends Jewish service: Not on file     Active member of club or organization: Not on file     Attends meetings of clubs or organizations: Not on file     Relationship status: Not on file    Intimate partner violence     Fear of current or ex partner: Not on file     Emotionally abused: Not on file     Physically abused: Not on file     Forced sexual activity: Not on file   Other Topics Concern    Not on file   Social History Narrative    Not on file       Vitals:    06/22/20 1300   BP: 112/66   Pulse: 64   Temp: 97.9 °F (36.6 °C)   TempSrc: Temporal   SpO2: 98%   Weight: 156 lb 3.2 oz (70.9 kg)   Height: 5' 5\" (1.651 m)       Physical Exam  Constitutional: She is oriented to person, place, and time. She appears well-developed and well-nourished. No distress.   Neck: Normal range of motion. Neck supple. No JVD present. No thyromegaly present. Cardiovascular: Normal rate, regular rhythm, normal heart sounds and intact distal pulses. Exam reveals no gallop and no friction rub.   No murmur heard. Pulmonary/Chest: Effort normal and breath sounds normal. No respiratory distress. She has no wheezes. She has no rales. Abdominal: Soft. Bowel sounds are normal. She exhibits no distension and no mass. There is no tenderness. Musculoskeletal: Normal range of motion. She exhibits no edema.    Did not examine the right knee at this time. Lymph nodes:     She has no cervical supraclavicular axillary or inguinal adenopathy. Neurological: She is alert and oriented to person, place, and time. She displays normal reflexes. No sensory deficit. She exhibits normal muscle tone. Coordination normal.   Skin: Skin is warm and dry. No rash noted. No erythema. Psychiatric: She has a normal mood and affect.  Her behavior is normal.   Nursing note and vitals reviewed.           Assessment and Plan:  Sterling Rashid was seen today for hyperlipidemia

## 2020-06-24 ENCOUNTER — TELEPHONE (OUTPATIENT)
Dept: FAMILY MEDICINE CLINIC | Age: 75
End: 2020-06-24

## 2020-06-24 LAB — URINE CULTURE, ROUTINE: NORMAL

## 2020-09-23 ENCOUNTER — HOSPITAL ENCOUNTER (OUTPATIENT)
Age: 75
Discharge: HOME | End: 2020-09-25
Payer: MEDICARE

## 2020-09-23 ENCOUNTER — OFFICE VISIT (OUTPATIENT)
Dept: FAMILY MEDICINE CLINIC | Age: 75
End: 2020-09-23
Payer: MEDICARE

## 2020-09-23 VITALS
SYSTOLIC BLOOD PRESSURE: 116 MMHG | TEMPERATURE: 96.9 F | HEIGHT: 65 IN | HEART RATE: 68 BPM | OXYGEN SATURATION: 100 % | WEIGHT: 156.8 LBS | DIASTOLIC BLOOD PRESSURE: 60 MMHG | BODY MASS INDEX: 26.12 KG/M2

## 2020-09-23 LAB
ALBUMIN SERPL-MCNC: 4.4 G/DL (ref 3.5–5.2)
ALP BLD-CCNC: 70 U/L (ref 35–104)
ALT SERPL-CCNC: 10 U/L (ref 0–32)
ANION GAP SERPL CALCULATED.3IONS-SCNC: 13 MMOL/L (ref 7–16)
AST SERPL-CCNC: 19 U/L (ref 0–31)
BASOPHILS ABSOLUTE: 0.02 E9/L (ref 0–0.2)
BASOPHILS RELATIVE PERCENT: 0.5 % (ref 0–2)
BILIRUB SERPL-MCNC: <0.2 MG/DL (ref 0–1.2)
BUN BLDV-MCNC: 22 MG/DL (ref 8–23)
CALCIUM SERPL-MCNC: 9.3 MG/DL (ref 8.6–10.2)
CHLORIDE BLD-SCNC: 99 MMOL/L (ref 98–107)
CHOLESTEROL, TOTAL: 179 MG/DL (ref 0–199)
CO2: 25 MMOL/L (ref 22–29)
CREAT SERPL-MCNC: 0.9 MG/DL (ref 0.5–1)
EOSINOPHILS ABSOLUTE: 0.05 E9/L (ref 0.05–0.5)
EOSINOPHILS RELATIVE PERCENT: 1.2 % (ref 0–6)
GFR AFRICAN AMERICAN: >60
GFR NON-AFRICAN AMERICAN: >60 ML/MIN/1.73
GLUCOSE BLD-MCNC: 77 MG/DL (ref 74–99)
HCT VFR BLD CALC: 34.3 % (ref 34–48)
HDLC SERPL-MCNC: 65 MG/DL
HEMOGLOBIN: 10.9 G/DL (ref 11.5–15.5)
IMMATURE GRANULOCYTES #: 0.01 E9/L
IMMATURE GRANULOCYTES %: 0.2 % (ref 0–5)
LDL CHOLESTEROL CALCULATED: 95 MG/DL (ref 0–99)
LYMPHOCYTES ABSOLUTE: 1.85 E9/L (ref 1.5–4)
LYMPHOCYTES RELATIVE PERCENT: 43.8 % (ref 20–42)
MCH RBC QN AUTO: 31.1 PG (ref 26–35)
MCHC RBC AUTO-ENTMCNC: 31.8 % (ref 32–34.5)
MCV RBC AUTO: 98 FL (ref 80–99.9)
MONOCYTES ABSOLUTE: 0.47 E9/L (ref 0.1–0.95)
MONOCYTES RELATIVE PERCENT: 11.1 % (ref 2–12)
NEUTROPHILS ABSOLUTE: 1.82 E9/L (ref 1.8–7.3)
NEUTROPHILS RELATIVE PERCENT: 43.2 % (ref 43–80)
PDW BLD-RTO: 13.7 FL (ref 11.5–15)
PLATELET # BLD: 142 E9/L (ref 130–450)
PMV BLD AUTO: 11.2 FL (ref 7–12)
POTASSIUM SERPL-SCNC: 4.3 MMOL/L (ref 3.5–5)
RBC # BLD: 3.5 E12/L (ref 3.5–5.5)
SODIUM BLD-SCNC: 137 MMOL/L (ref 132–146)
TOTAL PROTEIN: 6.6 G/DL (ref 6.4–8.3)
TRIGL SERPL-MCNC: 94 MG/DL (ref 0–149)
VITAMIN D 25-HYDROXY: 48 NG/ML (ref 30–100)
VLDLC SERPL CALC-MCNC: 19 MG/DL
WBC # BLD: 4.2 E9/L (ref 4.5–11.5)

## 2020-09-23 PROCEDURE — 99214 OFFICE O/P EST MOD 30 MIN: CPT | Performed by: INTERNAL MEDICINE

## 2020-09-23 PROCEDURE — G8417 CALC BMI ABV UP PARAM F/U: HCPCS | Performed by: INTERNAL MEDICINE

## 2020-09-23 PROCEDURE — 80061 LIPID PANEL: CPT

## 2020-09-23 PROCEDURE — 1123F ACP DISCUSS/DSCN MKR DOCD: CPT | Performed by: INTERNAL MEDICINE

## 2020-09-23 PROCEDURE — 82306 VITAMIN D 25 HYDROXY: CPT

## 2020-09-23 PROCEDURE — 4040F PNEUMOC VAC/ADMIN/RCVD: CPT | Performed by: INTERNAL MEDICINE

## 2020-09-23 PROCEDURE — 85025 COMPLETE CBC W/AUTO DIFF WBC: CPT

## 2020-09-23 PROCEDURE — 80053 COMPREHEN METABOLIC PANEL: CPT

## 2020-09-23 PROCEDURE — 1090F PRES/ABSN URINE INCON ASSESS: CPT | Performed by: INTERNAL MEDICINE

## 2020-09-23 PROCEDURE — G8427 DOCREV CUR MEDS BY ELIG CLIN: HCPCS | Performed by: INTERNAL MEDICINE

## 2020-09-23 PROCEDURE — 1036F TOBACCO NON-USER: CPT | Performed by: INTERNAL MEDICINE

## 2020-09-23 PROCEDURE — 36415 COLL VENOUS BLD VENIPUNCTURE: CPT

## 2020-09-23 PROCEDURE — G8400 PT W/DXA NO RESULTS DOC: HCPCS | Performed by: INTERNAL MEDICINE

## 2020-09-23 RX ORDER — OMEPRAZOLE 20 MG/1
20 CAPSULE, DELAYED RELEASE ORAL DAILY
COMMUNITY

## 2020-09-23 RX ORDER — HYDROCHLOROTHIAZIDE 12.5 MG/1
12.5 CAPSULE, GELATIN COATED ORAL DAILY
Qty: 90 CAPSULE | Refills: 1 | Status: SHIPPED
Start: 2020-09-23 | End: 2021-06-17 | Stop reason: SDUPTHER

## 2020-09-23 RX ORDER — POTASSIUM CHLORIDE 20 MEQ/1
20 TABLET, EXTENDED RELEASE ORAL DAILY
Qty: 90 TABLET | Refills: 1 | Status: SHIPPED
Start: 2020-09-23 | End: 2021-03-17 | Stop reason: SDUPTHER

## 2020-09-23 RX ORDER — PRAVASTATIN SODIUM 20 MG
20 TABLET ORAL DAILY
Qty: 90 TABLET | Refills: 1 | Status: SHIPPED
Start: 2020-09-23 | End: 2021-06-17 | Stop reason: SDUPTHER

## 2020-09-23 RX ORDER — LORATADINE 10 MG/1
10 TABLET ORAL DAILY
COMMUNITY
End: 2021-03-17 | Stop reason: ALTCHOICE

## 2020-09-23 ASSESSMENT — PATIENT HEALTH QUESTIONNAIRE - PHQ9
2. FEELING DOWN, DEPRESSED OR HOPELESS: 0
1. LITTLE INTEREST OR PLEASURE IN DOING THINGS: 0
SUM OF ALL RESPONSES TO PHQ QUESTIONS 1-9: 0
SUM OF ALL RESPONSES TO PHQ9 QUESTIONS 1 & 2: 0
SUM OF ALL RESPONSES TO PHQ QUESTIONS 1-9: 0

## 2020-09-23 NOTE — PROGRESS NOTES
[address] [facility] [address] [facility]     20  Imelda Sanchez : 1945 Sex: female  Age: 67 y.o. Chief Complaint   Patient presents with    Hyperlipidemia       HPI  Patient presents today for 3-month follow-up visit on her multiple medical problems. I reviewed last note. Her blood pressures remain in a good range although she tells me she is getting a few low readings in the morning. Sounds to be after taking her medications. I did ask her to start taking it after dinner later in the evening before she goes to bed so this will peak hopefully in the night and will not affect her this way. Her average numbers during the daytime she states still running about 907 range systolic. Her ulcerative colitis has been quiesced sent. Depression has been stable on medication. She lives with her daughter and the relationship is been good. She is following with urology they have offered her InterStim which she is thinking about having done. She did have a trip and fall scheduled for a supplement. Did not lose consciousness. Was not dizzy. Weight is been stable. Continues to walk and stay active on a regular basis. She follows with GI and orthopedics    Review of Systems     Constitutional: Negative for activity change, appetite change, chills, diaphoresis, , fever and unexpected weight change.    HENT: Negative for congestion, postnasal drip, rhinorrhea and sinus pain.    Respiratory: Negative for cough, shortness of breath and wheezing.    Cardiovascular: Negative for chest pain, palpitations and leg swelling. Gastrointestinal: Negative for abdominal pain, blood in stool, constipation, diarrhea, nausea and vomiting. Does have history of ulcerative colitis. Endocrine: Negative.    Genitourinary: Positive for urgency frequency. Negative for difficulty urinating, dysuria,  and hematuria. Is now seeing urology  Musculoskeletal: Positive for arthralgias.  Negative for back pain, gait problem and knee,  Posterior-lateral fusion L2-5    Right total knee arthroplasty-2019   old records reviewed  FH:  Father:  Heart Disease -  age 80. Mother:  Non Insulin Dependent Diabetes -  age84  Congestive Heart Failure (CHF). Brother 1:  Obesity, Peripheral Vascular Disease (PVD). Maternal Grandmother:  Breast Cancer. SH:  Marital: Legal Status: . retired   Personal Habits: Cigarette Use: Does Not Smoke. Alcohol: Denies alcohol use            Current Outpatient Medications:     loratadine (CLARITIN) 10 MG tablet, Take 10 mg by mouth daily, Disp: , Rfl:     vitamin D 25 MCG (1000 UT) CAPS, Take by mouth, Disp: , Rfl:     omeprazole (PRILOSEC) 20 MG delayed release capsule, Take 20 mg by mouth daily, Disp: , Rfl:     pravastatin (PRAVACHOL) 20 MG tablet, Take 1 tablet by mouth daily, Disp: 90 tablet, Rfl: 1    hydroCHLOROthiazide (MICROZIDE) 12.5 MG capsule, Take 1 capsule by mouth daily, Disp: 90 capsule, Rfl: 1    potassium chloride (KLOR-CON M) 20 MEQ extended release tablet, Take 1 tablet by mouth daily, Disp: 90 tablet, Rfl: 1    Biotin 13503 MCG TABS, Take by mouth daily , Disp: , Rfl:     sulfaSALAzine (AZULFIDINE) 500 MG tablet, Take 2 tablets by mouth 2 times daily 2 tablets twice a day (Patient taking differently: Take 500 mg by mouth 2 times daily ), Disp: 180 tablet, Rfl: 1    escitalopram (LEXAPRO) 10 MG tablet, Take 1 tablet by mouth daily, Disp: 90 tablet, Rfl: 1    lisinopril (PRINIVIL;ZESTRIL) 20 MG tablet, Take 1 tablet by mouth daily, Disp: 90 tablet, Rfl: 1    dilTIAZem (DILACOR XR) 180 MG extended release capsule, Take 1 capsule by mouth daily, Disp: 90 capsule, Rfl: 1    diphenhydrAMINE-APAP, sleep, (TYLENOL PM EXTRA STRENGTH)  MG tablet, Take 2 tablets by mouth nightly as needed for Sleep , Disp: , Rfl:   Allergies   Allergen Reactions    Erythromycin Hives    Penicillin G     Azithromycin     Penicillins Hives    Propoxyphene        Past Medical Smoker    Smokeless tobacco: Never Used   Substance and Sexual Activity    Alcohol use: No     Alcohol/week: 0.0 standard drinks    Drug use: No    Sexual activity: Not on file   Lifestyle    Physical activity     Days per week: Not on file     Minutes per session: Not on file    Stress: Not on file   Relationships    Social connections     Talks on phone: Not on file     Gets together: Not on file     Attends Jain service: Not on file     Active member of club or organization: Not on file     Attends meetings of clubs or organizations: Not on file     Relationship status: Not on file    Intimate partner violence     Fear of current or ex partner: Not on file     Emotionally abused: Not on file     Physically abused: Not on file     Forced sexual activity: Not on file   Other Topics Concern    Not on file   Social History Narrative    Not on file       Vitals:    09/23/20 1249   BP: 116/60   Pulse: 68   Temp: 96.9 °F (36.1 °C)   TempSrc: Temporal   SpO2: 100%   Weight: 156 lb 12.8 oz (71.1 kg)   Height: 5' 5\" (1.651 m)       Physical Exam  Constitutional: She is oriented to person, place, and time. She appears well-developed and well-nourished. No distress.   Neck: Normal range of motion. Neck supple. No JVD present. No thyromegaly present. Cardiovascular: Normal rate, regular rhythm, normal heart sounds and intact distal pulses. Exam reveals no gallop and no friction rub.   No murmur heard. Pulmonary/Chest: Effort normal and breath sounds normal. No respiratory distress. She has no wheezes. She has no rales. Abdominal: Soft. Bowel sounds are normal. She exhibits no distension and no mass. There is no tenderness. Musculoskeletal: Normal range of motion. She exhibits no edema.      Neurological: She is alert and oriented to person, place, and time. She displays normal reflexes. No sensory deficit. She exhibits normal muscle tone. Coordination normal.   Skin: Skin is warm and dry. No rash noted. No erythema. Psychiatric: She has a normal mood and affect. Her behavior is normal.   Nursing note and vitals reviewed. Assessment and Plan:  Cedrick Amaro was seen today for hyperlipidemia. Diagnoses and all orders for this visit:    Essential hypertension  -     hydroCHLOROthiazide (MICROZIDE) 12.5 MG capsule; Take 1 capsule by mouth daily  -     potassium chloride (KLOR-CON M) 20 MEQ extended release tablet; Take 1 tablet by mouth daily  -     CBC Auto Differential; Future  -     Comprehensive Metabolic Panel; Future    Ulcerative colitis without complications, unspecified location ([facility]    Hyperlipidemia, unspecified hyperlipidemia type  -     pravastatin (PRAVACHOL) 20 MG tablet; Take 1 tablet by mouth daily  -     Lipid Panel; Future    Major depressive disorder with single episode, in remission ([facility]    Vitamin D deficiency  -     Vitamin D 25 Hydroxy; Future    Plan: I will see her back in 3 months and as needed. Fall precautions. Blood work to monitor disease progression medication use. Prescription management performed. Continue walking and exercising. Notify us with problems in the interim. Return in about 3 months (around 12/23/2020). Seen By:  Arlen Murrell MD      *Document was created using voice recognition software. Note was reviewed however may contain grammatical errors.

## 2020-09-24 ENCOUNTER — TELEPHONE (OUTPATIENT)
Dept: FAMILY MEDICINE CLINIC | Age: 75
End: 2020-09-24

## 2020-09-28 ENCOUNTER — TELEPHONE (OUTPATIENT)
Dept: FAMILY MEDICINE CLINIC | Age: 75
End: 2020-09-28

## 2020-09-28 LAB
CONTROL: NORMAL
HEMOCCULT STL QL: NEGATIVE

## 2020-11-30 ENCOUNTER — OFFICE VISIT (OUTPATIENT)
Dept: FAMILY MEDICINE CLINIC | Age: 75
End: 2020-11-30
Payer: MEDICARE

## 2020-11-30 ENCOUNTER — TELEPHONE (OUTPATIENT)
Dept: FAMILY MEDICINE CLINIC | Age: 75
End: 2020-11-30

## 2020-11-30 VITALS
SYSTOLIC BLOOD PRESSURE: 126 MMHG | BODY MASS INDEX: 26.26 KG/M2 | DIASTOLIC BLOOD PRESSURE: 68 MMHG | TEMPERATURE: 97.6 F | WEIGHT: 157.6 LBS | OXYGEN SATURATION: 98 % | HEIGHT: 65 IN | HEART RATE: 69 BPM

## 2020-11-30 DIAGNOSIS — I10 ESSENTIAL HYPERTENSION: ICD-10-CM

## 2020-11-30 LAB
ALBUMIN SERPL-MCNC: 4.5 G/DL (ref 3.5–5.2)
ALP BLD-CCNC: 85 U/L (ref 35–104)
ALT SERPL-CCNC: 10 U/L (ref 0–32)
ANION GAP SERPL CALCULATED.3IONS-SCNC: 12 MMOL/L (ref 7–16)
AST SERPL-CCNC: 18 U/L (ref 0–31)
BASOPHILS ABSOLUTE: 0.01 E9/L (ref 0–0.2)
BASOPHILS RELATIVE PERCENT: 0.2 % (ref 0–2)
BILIRUB SERPL-MCNC: 0.3 MG/DL (ref 0–1.2)
BUN BLDV-MCNC: 18 MG/DL (ref 8–23)
CALCIUM SERPL-MCNC: 9.6 MG/DL (ref 8.6–10.2)
CHLORIDE BLD-SCNC: 97 MMOL/L (ref 98–107)
CO2: 26 MMOL/L (ref 22–29)
CREAT SERPL-MCNC: 0.9 MG/DL (ref 0.5–1)
EOSINOPHILS ABSOLUTE: 0.05 E9/L (ref 0.05–0.5)
EOSINOPHILS RELATIVE PERCENT: 1 % (ref 0–6)
GFR AFRICAN AMERICAN: >60
GFR NON-AFRICAN AMERICAN: >60 ML/MIN/1.73
GLUCOSE BLD-MCNC: 89 MG/DL (ref 74–99)
HCT VFR BLD CALC: 36.1 % (ref 34–48)
HEMOGLOBIN: 11.7 G/DL (ref 11.5–15.5)
IMMATURE GRANULOCYTES #: 0.02 E9/L
IMMATURE GRANULOCYTES %: 0.4 % (ref 0–5)
LYMPHOCYTES ABSOLUTE: 1.71 E9/L (ref 1.5–4)
LYMPHOCYTES RELATIVE PERCENT: 34.1 % (ref 20–42)
MCH RBC QN AUTO: 30.9 PG (ref 26–35)
MCHC RBC AUTO-ENTMCNC: 32.4 % (ref 32–34.5)
MCV RBC AUTO: 95.3 FL (ref 80–99.9)
MONOCYTES ABSOLUTE: 0.41 E9/L (ref 0.1–0.95)
MONOCYTES RELATIVE PERCENT: 8.2 % (ref 2–12)
NEUTROPHILS ABSOLUTE: 2.82 E9/L (ref 1.8–7.3)
NEUTROPHILS RELATIVE PERCENT: 56.1 % (ref 43–80)
PDW BLD-RTO: 12.4 FL (ref 11.5–15)
PLATELET # BLD: 187 E9/L (ref 130–450)
PMV BLD AUTO: 11.3 FL (ref 7–12)
POTASSIUM SERPL-SCNC: 4.4 MMOL/L (ref 3.5–5)
RBC # BLD: 3.79 E12/L (ref 3.5–5.5)
SODIUM BLD-SCNC: 135 MMOL/L (ref 132–146)
TOTAL PROTEIN: 7.4 G/DL (ref 6.4–8.3)
WBC # BLD: 5 E9/L (ref 4.5–11.5)

## 2020-11-30 PROCEDURE — G8417 CALC BMI ABV UP PARAM F/U: HCPCS | Performed by: INTERNAL MEDICINE

## 2020-11-30 PROCEDURE — 1036F TOBACCO NON-USER: CPT | Performed by: INTERNAL MEDICINE

## 2020-11-30 PROCEDURE — G8427 DOCREV CUR MEDS BY ELIG CLIN: HCPCS | Performed by: INTERNAL MEDICINE

## 2020-11-30 PROCEDURE — G8400 PT W/DXA NO RESULTS DOC: HCPCS | Performed by: INTERNAL MEDICINE

## 2020-11-30 PROCEDURE — G8482 FLU IMMUNIZE ORDER/ADMIN: HCPCS | Performed by: INTERNAL MEDICINE

## 2020-11-30 PROCEDURE — 93000 ELECTROCARDIOGRAM COMPLETE: CPT | Performed by: INTERNAL MEDICINE

## 2020-11-30 PROCEDURE — 4040F PNEUMOC VAC/ADMIN/RCVD: CPT | Performed by: INTERNAL MEDICINE

## 2020-11-30 PROCEDURE — 1090F PRES/ABSN URINE INCON ASSESS: CPT | Performed by: INTERNAL MEDICINE

## 2020-11-30 PROCEDURE — 1123F ACP DISCUSS/DSCN MKR DOCD: CPT | Performed by: INTERNAL MEDICINE

## 2020-11-30 PROCEDURE — 99214 OFFICE O/P EST MOD 30 MIN: CPT | Performed by: INTERNAL MEDICINE

## 2020-11-30 RX ORDER — DILTIAZEM HYDROCHLORIDE 180 MG/1
180 CAPSULE, EXTENDED RELEASE ORAL DAILY
Qty: 90 CAPSULE | Refills: 1 | Status: SHIPPED
Start: 2020-11-30 | End: 2021-06-08 | Stop reason: SDUPTHER

## 2020-11-30 RX ORDER — LISINOPRIL 20 MG/1
20 TABLET ORAL DAILY
Qty: 90 TABLET | Refills: 1 | Status: SHIPPED
Start: 2020-11-30 | End: 2021-06-17 | Stop reason: SDUPTHER

## 2020-11-30 RX ORDER — ESCITALOPRAM OXALATE 10 MG/1
10 TABLET ORAL DAILY
Qty: 90 TABLET | Refills: 1 | Status: SHIPPED
Start: 2020-11-30 | End: 2021-06-17 | Stop reason: SDUPTHER

## 2020-12-01 NOTE — PROGRESS NOTES
[facility]     20  Laura Rodarte : 1945 Sex: female  Age: 67 y.o. Chief Complaint   Patient presents with    Pre-op Exam     LT Total knee arthroplasty 12/15/2020    Hypertension       HPI    Patient presents today for preoperative evaluation and medical clearance for upcoming total left knee arthroplasty as requested by Dr. Wilmar Merritt of orthopedics. This to be accomplished 12/15/2020. She is voicing no specific complaints since last visit outside of her knee pain. She does bring in blood pressures today all of which look good. She has been stable from a depression standpoint on her current SSRI. Patient states that on her own she has stopped her sulfasalazine for her ulcerative colitis. It has been quiesced sent and she is having no difficulty at this time. She continues to live with her daughter and the relationship has been good. She was following with urology and discussing InterStim but as result this and have decided not to go through with it. She has had no falls. She follows with GI and orthopedics    Review of Systems     Constitutional: Negative for activity change, appetite change, chills, diaphoresis, , fever and unexpected weight change.    HENT: Negative for congestion, postnasal drip, rhinorrhea and sinus pain.    Respiratory: Negative for cough, shortness of breath and wheezing.    Cardiovascular: Negative for chest pain, palpitations and leg swelling. Gastrointestinal: Negative for abdominal pain, blood in stool, constipation, diarrhea, nausea and vomiting.  Does have history of ulcerative colitis. -Quiescent at this point  Endocrine: Negative.    Genitourinary: Positive for urgency frequency. Negative for difficulty urinating, dysuria,  and hematuria. No longer seeing urology  Musculoskeletal: Positive for arthralgias and left knee pain. Positive gait disturbance with her knee arthritis negative for back pain,  and myalgias.       Skin: Negative.    Allergic/Immunologic: Negative for environmental allergies and immunocompromised state. Neurological: Negative for dizziness, weakness, light-headedness, numbness and headaches. Hematological: Negative.    Psychiatric/Behavioral:        Depression has improved.  She is on medication.  Living with her daughter that is working out well  Things have been a little bit more trying with recent COVID-19 pandemic status and her spending more time at home. -Improving         REST OF PERTINENT ROS GONE OVER AND WAS NEGATIVE.      PMH:  Health Maintenance:  Mammogram - (7/9/2018)  Mammogram Screening - (7/9/2018)  Influenza Vaccination - (9/20/2018)  Couseled on Home Safety - (2/26/2018)  Colonoscopy - (1/16/2018)  Colonoscopy Screening - (1/16/2018)  Bone Density Scan - (9/29/2016)  Pneumonia Vaccination - (9/22/2016)  Tetanus Immunization - (9/22/2016)  pt cannot recall if she had this vaccination, no record from pharmacy  Stress Test - 11/11,9/15  heart cath - 2/12-ok  Colonoscopy - 2/14,8/14,1/18-due 21  EKG - 3/14, 11/17  has Gyn - Dr. Yuriy Sanchez  2D ECHO - 9/15  Hemmocult Cards - 10/16-neg x 3  EGD - 1/18, 10/19,-due 22  Prevnar Vaccine - (11/3/2015) [facility]  Medical Problems:  Hypertension, Restless Leg Syndrome, irritable bladder, Depression  Ulcerative Colitis - follows with dr Rogelio Meyer  Anemia - follows with dr Shawn Hazel Polyps, over active bladder, vit D defic, chronic sinus congestion, Osteoarthritis, Diverticulosis, fx  right wrist, Pfo, Hyperlipidemia, Diastolic Dysfunction, Cholelithiasis, fracture right lateral malleolus,  Valvular Heart Disease, Lumbar DDD, Lumbar Spinal Stenosis, Lumbar herniated disc  Surgical Hx:  DEON - Non-cancerous  Left breast mass removal - x2-benign  Right carpal tunnel surgery, Rectocele repair, Left knee arthroscopy, Bilateral total hip arthroplasty,  Bladder suspension, Bunion surgery, Bilateral cataract surgery, Repair of torn meniscus left knee,  Posterior-lateral fusion L2-5    Right total knee arthroplasty-   old records reviewed  FH:  Father:  Heart Disease -  age 80. Mother:  Non Insulin Dependent Diabetes -  age84  Congestive Heart Failure (CHF). Brother 1:  Obesity, Peripheral Vascular Disease (PVD). Maternal Grandmother:  Breast Cancer. SH:  Marital: Legal Status: . retired   Personal Habits: Cigarette Use: Does Not Smoke. Alcohol: Denies alcohol use                Current Outpatient Medications:     dilTIAZem (DILACOR XR) 180 MG extended release capsule, Take 1 capsule by mouth daily, Disp: 90 capsule, Rfl: 1    escitalopram (LEXAPRO) 10 MG tablet, Take 1 tablet by mouth daily, Disp: 90 tablet, Rfl: 1    lisinopril (PRINIVIL;ZESTRIL) 20 MG tablet, Take 1 tablet by mouth daily, Disp: 90 tablet, Rfl: 1    loratadine (CLARITIN) 10 MG tablet, Take 10 mg by mouth daily, Disp: , Rfl:     vitamin D 25 MCG (1000 UT) CAPS, Take by mouth, Disp: , Rfl:     omeprazole (PRILOSEC) 20 MG delayed release capsule, Take 20 mg by mouth daily, Disp: , Rfl:     pravastatin (PRAVACHOL) 20 MG tablet, Take 1 tablet by mouth daily, Disp: 90 tablet, Rfl: 1    hydroCHLOROthiazide (MICROZIDE) 12.5 MG capsule, Take 1 capsule by mouth daily, Disp: 90 capsule, Rfl: 1    potassium chloride (KLOR-CON M) 20 MEQ extended release tablet, Take 1 tablet by mouth daily, Disp: 90 tablet, Rfl: 1    Biotin 51622 MCG TABS, Take by mouth daily , Disp: , Rfl:     diphenhydrAMINE-APAP, sleep, (TYLENOL PM EXTRA STRENGTH)  MG tablet, Take 2 tablets by mouth nightly as needed for Sleep , Disp: , Rfl:   Allergies   Allergen Reactions    Erythromycin Hives    Penicillin G     Azithromycin     Penicillins Hives    Propoxyphene        Past Medical History:   Diagnosis Date    Anemia     Anemia     Cholelithiasis     Chronic congestion of paranasal sinus     Colon polyps     Depression     Diastolic dysfunction     Diverticulosis     HTN (hypertension)     Hyperlipidemia 2019    Irritable bladder     Lumbar degenerative disc disease     Lumbar herniated disc     Lumbar spinal stenosis     Major depressive disorder with single episode, in remission ([facility] 2019    Osteoarthritis     Overactive bladder     Restless leg syndrome     Restless leg syndrome     RLS (restless legs syndrome) 2019    Ulcerative colitis ([facility]     [facility]     Valvular heart disease     Vitamin D deficiency     Wrist fracture     Right     Past Surgical History:   Procedure Laterality Date    BLADDER SUSPENSION      BREAST LUMPECTOMY Left     x 2, negative    BUNIONECTOMY      CARDIAC CATHETERIZATION  2012    CARPAL TUNNEL RELEASE Right     CATARACT REMOVAL WITH IMPLANT Right     COLONOSCOPY  2014    HYSTERECTOMY, TOTAL ABDOMINAL      non-cancerous    JOINT REPLACEMENT Bilateral     hip    KNEE ARTHROSCOPY Left     RECTOCELE REPAIR       Family History   Problem Relation Age of Onset    Heart Disease Father          age 80    Diabetes Mother          age 80    Heart Disease Mother         CHF    Other Brother         PVD, obesity    Cancer Paternal Grandmother         breast     Social History     Socioeconomic History    Marital status:      Spouse name: Not on file    Number of children: Not on file    Years of education: Not on file    Highest education level: Not on file   Occupational History    Not on file   Social Needs    Financial resource strain: Not on file    Food insecurity     Worry: Not on file     Inability: Not on file    Transportation needs     Medical: Not on file     Non-medical: Not on file   Tobacco Use    Smoking status: Never Smoker    Smokeless tobacco: Never Used   Substance and Sexual Activity    Alcohol use: No     Alcohol/week: 0.0 standard drinks    Drug use: No    Sexual activity: Not on file   Lifestyle    Physical activity     Days per week: Not on file     Minutes per session: Not on file    Stress: Not on file   Relationships    Social connections     Talks on phone: Not on file     Gets together: Not on file     Attends Baptism service: Not on file     Active member of club or organization: Not on file     Attends meetings of clubs or organizations: Not on file     Relationship status: Not on file    Intimate partner violence     Fear of current or ex partner: Not on file     Emotionally abused: Not on file     Physically abused: Not on file     Forced sexual activity: Not on file   Other Topics Concern    Not on file   Social History Narrative    Not on file       Vitals:    11/30/20 0927   BP: 126/68   Pulse: 69   Temp: 97.6 °F (36.4 °C)   TempSrc: Temporal   SpO2: 98%   Weight: 157 lb 9.6 oz (71.5 kg)   Height: 5' 5\" (1.651 m)       Physical Exam    Constitutional: She is oriented to person, place, and time. She appears well-developed and well-nourished. No distress.   Neck: Normal range of motion. Neck supple. No JVD present. No thyromegaly present. Cardiovascular: Normal rate, regular rhythm, normal heart sounds and intact distal pulses. Exam reveals no gallop and no friction rub.   No murmur heard. Pulmonary/Chest: Effort normal and breath sounds normal. No respiratory distress. She has no wheezes. She has no rales. Abdominal: Soft. Bowel sounds are normal. She exhibits no distension and no mass. There is no tenderness. Musculoskeletal: Normal range of motion. She exhibits no edema.      Neurological: She is alert and oriented to person, place, and time. She displays normal reflexes. No sensory deficit. She exhibits normal muscle tone. Coordination normal.   Skin: Skin is warm and dry. No rash noted. No erythema. Psychiatric: She has a normal mood and affect. Her behavior is normal.   Nursing note and vitals reviewed. Assessment and Plan:  Clair Hughes was seen today for pre-op exam and hypertension.     Diagnoses and all

## 2020-12-01 NOTE — TELEPHONE ENCOUNTER
Labs okay.   Send these in a copy of her EKG done today to Dr. Viviana Mendiola of orthopedics who will be doing surgery on her knee upcoming

## 2020-12-08 ENCOUNTER — TELEPHONE (OUTPATIENT)
Dept: ADMINISTRATIVE | Age: 75
End: 2020-12-08

## 2021-01-28 ENCOUNTER — TELEPHONE (OUTPATIENT)
Dept: FAMILY MEDICINE CLINIC | Age: 76
End: 2021-01-28

## 2021-01-28 NOTE — TELEPHONE ENCOUNTER
Per patient she does not need clearance because she had it done in November. I called and spoke with Ale Meza at Dr. Katty Niño office and she will give us a call back to let us know P# [phone].

## 2021-02-10 ENCOUNTER — TELEPHONE (OUTPATIENT)
Dept: FAMILY MEDICINE CLINIC | Age: 76
End: 2021-02-10

## 2021-02-10 NOTE — TELEPHONE ENCOUNTER
Alejandro calling to tell you that Dr Xavier Means has moved her surgery to 2/23/21. She is asking if you will need to see her again to clear her?

## 2021-02-26 ENCOUNTER — TELEPHONE (OUTPATIENT)
Dept: FAMILY MEDICINE CLINIC | Age: 76
End: 2021-02-26

## 2021-02-26 NOTE — TELEPHONE ENCOUNTER
Patient recently had sx by dr Moises Osman whom is ordering home care. Will you follow for medical management, with PT&OT?

## 2021-03-10 ENCOUNTER — OFFICE VISIT (OUTPATIENT)
Dept: FAMILY MEDICINE CLINIC | Age: 76
End: 2021-03-10
Payer: MEDICARE

## 2021-03-10 VITALS
HEART RATE: 74 BPM | TEMPERATURE: 97.7 F | RESPIRATION RATE: 18 BRPM | OXYGEN SATURATION: 97 % | SYSTOLIC BLOOD PRESSURE: 118 MMHG | HEIGHT: 65 IN | WEIGHT: 161 LBS | BODY MASS INDEX: 26.82 KG/M2 | DIASTOLIC BLOOD PRESSURE: 62 MMHG

## 2021-03-10 DIAGNOSIS — R30.0 DYSURIA: Primary | ICD-10-CM

## 2021-03-10 DIAGNOSIS — R30.0 DYSURIA: ICD-10-CM

## 2021-03-10 LAB
BILIRUBIN, POC: NORMAL
BLOOD URINE, POC: NORMAL
CLARITY, POC: CLEAR
COLOR, POC: YELLOW
GLUCOSE URINE, POC: NORMAL
KETONES, POC: NORMAL
LEUKOCYTE EST, POC: NORMAL
NITRITE, POC: NORMAL
PH, POC: 5.5
PROTEIN, POC: 100
SPECIFIC GRAVITY, POC: 1.02
UROBILINOGEN, POC: 4

## 2021-03-10 PROCEDURE — 1123F ACP DISCUSS/DSCN MKR DOCD: CPT | Performed by: PHYSICIAN ASSISTANT

## 2021-03-10 PROCEDURE — 99213 OFFICE O/P EST LOW 20 MIN: CPT | Performed by: PHYSICIAN ASSISTANT

## 2021-03-10 PROCEDURE — 1090F PRES/ABSN URINE INCON ASSESS: CPT | Performed by: PHYSICIAN ASSISTANT

## 2021-03-10 PROCEDURE — 4040F PNEUMOC VAC/ADMIN/RCVD: CPT | Performed by: PHYSICIAN ASSISTANT

## 2021-03-10 PROCEDURE — G8417 CALC BMI ABV UP PARAM F/U: HCPCS | Performed by: PHYSICIAN ASSISTANT

## 2021-03-10 PROCEDURE — G8484 FLU IMMUNIZE NO ADMIN: HCPCS | Performed by: PHYSICIAN ASSISTANT

## 2021-03-10 PROCEDURE — G8427 DOCREV CUR MEDS BY ELIG CLIN: HCPCS | Performed by: PHYSICIAN ASSISTANT

## 2021-03-10 PROCEDURE — 81002 URINALYSIS NONAUTO W/O SCOPE: CPT | Performed by: PHYSICIAN ASSISTANT

## 2021-03-10 PROCEDURE — G8400 PT W/DXA NO RESULTS DOC: HCPCS | Performed by: PHYSICIAN ASSISTANT

## 2021-03-10 PROCEDURE — 1036F TOBACCO NON-USER: CPT | Performed by: PHYSICIAN ASSISTANT

## 2021-03-10 RX ORDER — CEFDINIR 300 MG/1
300 CAPSULE ORAL 2 TIMES DAILY
Qty: 14 CAPSULE | Refills: 0 | Status: SHIPPED
Start: 2021-03-10 | End: 2021-03-17 | Stop reason: ALTCHOICE

## 2021-03-10 RX ORDER — ROPINIROLE 0.25 MG/1
0.25 TABLET, FILM COATED ORAL
COMMUNITY
End: 2021-03-17 | Stop reason: ALTCHOICE

## 2021-03-10 RX ORDER — ASPIRIN 325 MG/1
TABLET, COATED ORAL
COMMUNITY
Start: 2021-02-26 | End: 2021-06-17 | Stop reason: ALTCHOICE

## 2021-03-10 RX ORDER — HYDROCODONE BITARTRATE AND ACETAMINOPHEN 5; 325 MG/1; MG/1
TABLET ORAL
COMMUNITY
Start: 2021-02-26 | End: 2021-03-17 | Stop reason: ALTCHOICE

## 2021-03-10 NOTE — PROGRESS NOTES
Chief Complaint:   Dysuria      History of Present Illness       Elida Magallanes is a 67 y.o. old female who  presents to [facility] for dysuria, urgency  for past 2 days. Denies back or flank pain. Denies fever or chills. Denies any pelvic pain, abdominal pain, vaginal discharge, vomiting, diarrhea, or lethargy. Eating and drinking without difficulty. Denies other symptoms and presents for evaluation. ROS    Unless otherwise stated in this report or unable to obtain because of the patient's clinical or mental status as evidenced by the medical record, this patients's positive and negative responses for Review of Systems, constitutional, psych, eyes, ENT, cardiovascular, respiratory, gastrointestinal, neurological, genitourinary, musculoskeletal, integument systems and systems related to the presenting problem are either stated in the preceding or were not pertinent or were negative for the symptoms and/or complaints related to the medical problem.     Past Medical History:   Past Medical History:   Diagnosis Date    Anemia     Anemia     Cholelithiasis     Chronic congestion of paranasal sinus     Colon polyps     Depression     Diastolic dysfunction     Diverticulosis     HTN (hypertension)     Hyperlipidemia 6/20/2019    Irritable bladder     Lumbar degenerative disc disease     Lumbar herniated disc     Lumbar spinal stenosis     Major depressive disorder with single episode, in remission ([address] 6/20/2019    Osteoarthritis     Overactive bladder     Restless leg syndrome     Restless leg syndrome     RLS (restless legs syndrome) 6/20/2019    Ulcerative colitis ([address]     Ulcerative colitis without complications ([address] 0/45/7652    Valvular heart disease     Vitamin D deficiency     Wrist fracture     Right        Past Surgical history:   Past Surgical History:   Procedure Laterality Date    BLADDER SUSPENSION  1978    BREAST LUMPECTOMY Left     x 2, negative    BUNIONECTOMY UA large     Nitrite, UA pos          Medical Decision Making:    Patient is well appearing, non toxic and appropriate for outpatient management. Plan is for symptom management and PCP follow up. Assessment / Plan     Impression(s):  Assessment and Plan:  Alicia Preston was seen today for dysuria. Diagnoses and all orders for this visit:    Dysuria  -     POCT Urinalysis no Micro  -     cefdinir (OMNICEF) 300 MG capsule; Take 1 capsule by mouth 2 times daily for 7 days  -     Culture, Urine; Future      Penicillin allergy was a rash and hives. Denies anaphylaxis or angioedema. Did review the crossover with cephalosporins. If she should have any itching or rashes she is to stop taking antihistamine and notify the office. Follow up with PCP in 7-10 days for repeat urine and recheck of symptoms. ER if changes or worse. Advised to take all medications as directed.      NATANAEL CovarrubiasC

## 2021-03-13 LAB
ORGANISM: ABNORMAL
URINE CULTURE, ROUTINE: ABNORMAL

## 2021-03-17 ENCOUNTER — OFFICE VISIT (OUTPATIENT)
Dept: FAMILY MEDICINE CLINIC | Age: 76
End: 2021-03-17
Payer: MEDICARE

## 2021-03-17 VITALS
BODY MASS INDEX: 26.02 KG/M2 | DIASTOLIC BLOOD PRESSURE: 62 MMHG | OXYGEN SATURATION: 98 % | TEMPERATURE: 97.4 F | WEIGHT: 156.2 LBS | HEIGHT: 65 IN | HEART RATE: 52 BPM | SYSTOLIC BLOOD PRESSURE: 126 MMHG

## 2021-03-17 DIAGNOSIS — Z00.00 ROUTINE GENERAL MEDICAL EXAMINATION AT A HEALTH CARE FACILITY: Primary | ICD-10-CM

## 2021-03-17 DIAGNOSIS — N39.0 URINARY TRACT INFECTION WITHOUT HEMATURIA, SITE UNSPECIFIED: ICD-10-CM

## 2021-03-17 DIAGNOSIS — E78.5 HYPERLIPIDEMIA, UNSPECIFIED HYPERLIPIDEMIA TYPE: ICD-10-CM

## 2021-03-17 DIAGNOSIS — F32.5 MAJOR DEPRESSIVE DISORDER WITH SINGLE EPISODE, IN REMISSION (HCC): ICD-10-CM

## 2021-03-17 DIAGNOSIS — I10 ESSENTIAL HYPERTENSION: ICD-10-CM

## 2021-03-17 LAB
BILIRUBIN URINE: NEGATIVE
BLOOD, URINE: NEGATIVE
CHOLESTEROL, TOTAL: 153 MG/DL (ref 0–199)
CLARITY: CLEAR
COLOR: YELLOW
GLUCOSE URINE: NEGATIVE MG/DL
HDLC SERPL-MCNC: 47 MG/DL
KETONES, URINE: NEGATIVE MG/DL
LDL CHOLESTEROL CALCULATED: 76 MG/DL (ref 0–99)
LEUKOCYTE ESTERASE, URINE: NEGATIVE
NITRITE, URINE: NEGATIVE
PH UA: 5.5 (ref 5–9)
PROTEIN UA: NEGATIVE MG/DL
SPECIFIC GRAVITY UA: 1.02 (ref 1–1.03)
TRIGL SERPL-MCNC: 148 MG/DL (ref 0–149)
UROBILINOGEN, URINE: 0.2 E.U./DL
VLDLC SERPL CALC-MCNC: 30 MG/DL

## 2021-03-17 PROCEDURE — 99214 OFFICE O/P EST MOD 30 MIN: CPT | Performed by: INTERNAL MEDICINE

## 2021-03-17 PROCEDURE — G8400 PT W/DXA NO RESULTS DOC: HCPCS | Performed by: INTERNAL MEDICINE

## 2021-03-17 PROCEDURE — 1090F PRES/ABSN URINE INCON ASSESS: CPT | Performed by: INTERNAL MEDICINE

## 2021-03-17 PROCEDURE — 1123F ACP DISCUSS/DSCN MKR DOCD: CPT | Performed by: INTERNAL MEDICINE

## 2021-03-17 PROCEDURE — 1036F TOBACCO NON-USER: CPT | Performed by: INTERNAL MEDICINE

## 2021-03-17 PROCEDURE — G8417 CALC BMI ABV UP PARAM F/U: HCPCS | Performed by: INTERNAL MEDICINE

## 2021-03-17 PROCEDURE — G8427 DOCREV CUR MEDS BY ELIG CLIN: HCPCS | Performed by: INTERNAL MEDICINE

## 2021-03-17 PROCEDURE — G8484 FLU IMMUNIZE NO ADMIN: HCPCS | Performed by: INTERNAL MEDICINE

## 2021-03-17 PROCEDURE — G0438 PPPS, INITIAL VISIT: HCPCS | Performed by: INTERNAL MEDICINE

## 2021-03-17 PROCEDURE — 4040F PNEUMOC VAC/ADMIN/RCVD: CPT | Performed by: INTERNAL MEDICINE

## 2021-03-17 RX ORDER — DOCUSATE SODIUM 100 MG/1
100 CAPSULE, LIQUID FILLED ORAL 2 TIMES DAILY
COMMUNITY
End: 2021-06-30 | Stop reason: CLARIF

## 2021-03-17 RX ORDER — POTASSIUM CHLORIDE 20 MEQ/1
20 TABLET, EXTENDED RELEASE ORAL DAILY
Qty: 90 TABLET | Refills: 1 | Status: SHIPPED
Start: 2021-03-17 | End: 2021-06-17 | Stop reason: SDUPTHER

## 2021-03-17 ASSESSMENT — PATIENT HEALTH QUESTIONNAIRE - PHQ9
SUM OF ALL RESPONSES TO PHQ QUESTIONS 1-9: 0
SUM OF ALL RESPONSES TO PHQ QUESTIONS 1-9: 0
2. FEELING DOWN, DEPRESSED OR HOPELESS: 0
SUM OF ALL RESPONSES TO PHQ QUESTIONS 1-9: 0
SUM OF ALL RESPONSES TO PHQ9 QUESTIONS 1 & 2: 0
2. FEELING DOWN, DEPRESSED OR HOPELESS: 0
1. LITTLE INTEREST OR PLEASURE IN DOING THINGS: 0
SUM OF ALL RESPONSES TO PHQ QUESTIONS 1-9: 0

## 2021-03-17 ASSESSMENT — LIFESTYLE VARIABLES
HOW OFTEN DO YOU HAVE A DRINK CONTAINING ALCOHOL: 0
AUDIT-C TOTAL SCORE: INCOMPLETE
HOW OFTEN DO YOU HAVE A DRINK CONTAINING ALCOHOL: NEVER
AUDIT TOTAL SCORE: INCOMPLETE

## 2021-03-17 NOTE — PROGRESS NOTES
Medicare Annual Wellness Visit  Name: Poli Spence Date: 3/17/2021   MRN: <[id number]> Sex: Female   Age: 67 y.o. Ethnicity: Non-/Non    : 1945 Race: [place] is here for Medicare AWV    Screenings for behavioral, psychosocial and functional/safety risks, and cognitive dysfunction are all negative except as indicated below. These results, as well as other patient data from the [address] form, are documented in Flowsheets linked to this Encounter. Allergies   Allergen Reactions    Erythromycin Hives    Penicillin G     Azithromycin     Penicillins Hives    Propoxyphene        Prior to Visit Medications    Medication Sig Taking?  Authorizing Provider   docusate sodium (COLACE) 100 MG capsule Take 100 mg by mouth 2 times daily  Historical Provider, MD   potassium chloride (KLOR-CON M) 20 MEQ extended release tablet Take 1 tablet by mouth daily  Stepan Neumann MD   EQ ASPIRIN 325 MG tablet TAKE 1 TABLET BY MOUTH ONCE DAILY FOR 30 DAYS  Historical Provider, MD   dilTIAZem (DILACOR XR) 180 MG extended release capsule Take 1 capsule by mouth daily  Stepan Neumann MD   escitalopram (LEXAPRO) 10 MG tablet Take 1 tablet by mouth daily  Stepan Neumann MD   lisinopril (PRINIVIL;ZESTRIL) 20 MG tablet Take 1 tablet by mouth daily  Stepan Neumann MD   vitamin D 25 MCG (1000 UT) CAPS Take by mouth  Historical Provider, MD   omeprazole (PRILOSEC) 20 MG delayed release capsule Take 20 mg by mouth daily  Historical Provider, MD   pravastatin (PRAVACHOL) 20 MG tablet Take 1 tablet by mouth daily  Stepan Neumann MD   hydroCHLOROthiazide (MICROZIDE) 12.5 MG capsule Take 1 capsule by mouth daily  Stepan Neumann MD   Biotin 54276 MCG TABS Take by mouth daily   Historical Provider, MD   diphenhydrAMINE-APAP, sleep, (TYLENOL PM EXTRA STRENGTH)  MG tablet Take 2 tablets by mouth nightly as needed for Sleep   Historical Provider, MD       Past Medical History: Diagnosis Date    Anemia     Anemia     Cholelithiasis     Chronic congestion of paranasal sinus     Colon polyps     Depression     Diastolic dysfunction     Diverticulosis     HTN (hypertension)     Hyperlipidemia 2019    Irritable bladder     Lumbar degenerative disc disease     Lumbar herniated disc     Lumbar spinal stenosis     Major depressive disorder with single episode, in remission ([address] 2019    Osteoarthritis     Overactive bladder     Restless leg syndrome     Restless leg syndrome     RLS (restless legs syndrome) 2019    Ulcerative colitis ([address]     Ulcerative colitis without complications ([address] 979    Valvular heart disease     Vitamin D deficiency     Wrist fracture     Right       Past Surgical History:   Procedure Laterality Date    BLADDER SUSPENSION      BREAST LUMPECTOMY Left     x 2, negative    BUNIONECTOMY      CARDIAC CATHETERIZATION  2012    CARPAL TUNNEL RELEASE Right     CATARACT REMOVAL WITH IMPLANT Right     COLONOSCOPY  2014    HYSTERECTOMY, TOTAL ABDOMINAL      non-cancerous    JOINT REPLACEMENT Bilateral     hip    KNEE ARTHROSCOPY Left     RECTOCELE REPAIR         Family History   Problem Relation Age of Onset    Heart Disease Father          age 80    Diabetes Mother          age 80    Heart Disease Mother         CHF    Other Brother         PVD, obesity    Cancer Paternal Grandmother         breast       CareTeam (Including outside providers/suppliers regularly involved in providing care):   Patient Care Team:  Alesia Morris MD as PCP - General (Internal Medicine)  Alesia Morris MD as P[facility] Empaneled Provider  Devin Diaz MD (Hematology)    Wt Readings from Last 3 Encounters:   21 156 lb 3.2 oz (70.9 kg)   03/10/21 161 lb (73 kg)   20 157 lb 9.6 oz (71.5 kg)     There were no vitals filed for this visit. There is no height or weight on file to calculate BMI.      Based upon Topic Date Due    Hepatitis C screen  Never done    COVID-19 Vaccine (1) Never done    Shingles Vaccine (1 of 2) Never done   [employer] Visit (AWV)  Never done    Lipid screen  09/23/2021    Potassium monitoring  02/24/2022    Creatinine monitoring  02/24/2022    DTaP/Tdap/Td vaccine (2 - Td) 05/21/2029    DEXA (modify frequency per FRAX score)  Completed    Flu vaccine  Completed    Pneumococcal 65+ years Vaccine  Completed    Hepatitis A vaccine  Aged Out    Hepatitis B vaccine  Aged Out    Hib vaccine  Aged Out    Meningococcal (ACWY) vaccine  Aged Out     Recommendations for [employer] Due: see orders and patient instructions/AVS.  . Recommended screening schedule for the next 5-10 years is provided to the patient in written form: see Patient Instructions/AVS.    There are no diagnoses linked to this encounter.

## 2021-03-17 NOTE — PROGRESS NOTES
[address] [facility] [address] [facility]     3/17/21  Ben Hatfield : 1945 Sex: female  Age: 67 y.o. Chief Complaint   Patient presents with    Hypertension       HPI    Presents today for 3-month follow-up visit on her multiple medical problems. She is also here for post hospital follow-up for recent left total knee arthroplasty at [facility].  She is currently getting home physical therapy and soon will be getting outside physical therapy. She is doing well with this. She follows with her orthopod again next week. Shows me blood pressures that are all in a good range. Her depression has been stable on her SSRI. Lipids have been stable on her statin medication. She tells me that she has had both Covid vaccines. She has had no falls. She is status post UTI treated with cefdinir recently. We are going to repeat a urine on her today. She follows with GI and orthopedics    Review of Systems     Constitutional: Negative for activity change, appetite change, chills, diaphoresis, , fever and unexpected weight change.    HENT: Negative for congestion, postnasal drip, rhinorrhea and sinus pain.    Respiratory: Negative for cough, shortness of breath and wheezing.    Cardiovascular: Negative for chest pain, palpitations and leg swelling. Gastrointestinal: Negative for abdominal pain, blood in stool, constipation, diarrhea, nausea and vomiting.  Does have history of ulcerative colitis. -Quiescent at this point  Endocrine: Negative.    Genitourinary: Positive for urgency frequency-recent UTI. Negative for difficulty urinating, dysuria,  and hematuria.    No longer seeing urology  Musculoskeletal: Positive for arthralgias and left knee pain. Positive gait disturbance with her knee arthritis negative for back pain,  and myalgias.       Skin: Negative.    Allergic/Immunologic: Negative for environmental allergies and immunocompromised state.    Neurological: Negative Heart Disease -  age 79. Mother:  Non Insulin Dependent Diabetes -  age84  Congestive Heart Failure (CHF). Brother 1:  Obesity, Peripheral Vascular Disease (PVD). Maternal Grandmother:  Breast Cancer. SH:  Marital: Legal Status: . retired   Personal Habits: Cigarette Use: Does Not Smoke. Alcohol: Denies alcohol use                Current Outpatient Medications:     docusate sodium (COLACE) 100 MG capsule, Take 100 mg by mouth 2 times daily, Disp: , Rfl:     potassium chloride (KLOR-CON M) 20 MEQ extended release tablet, Take 1 tablet by mouth daily, Disp: 90 tablet, Rfl: 1    EQ ASPIRIN 325 MG tablet, TAKE 1 TABLET BY MOUTH ONCE DAILY FOR 30 DAYS, Disp: , Rfl:     dilTIAZem (DILACOR XR) 180 MG extended release capsule, Take 1 capsule by mouth daily, Disp: 90 capsule, Rfl: 1    escitalopram (LEXAPRO) 10 MG tablet, Take 1 tablet by mouth daily, Disp: 90 tablet, Rfl: 1    lisinopril (PRINIVIL;ZESTRIL) 20 MG tablet, Take 1 tablet by mouth daily, Disp: 90 tablet, Rfl: 1    vitamin D 25 MCG (1000 UT) CAPS, Take by mouth, Disp: , Rfl:     omeprazole (PRILOSEC) 20 MG delayed release capsule, Take 20 mg by mouth daily, Disp: , Rfl:     pravastatin (PRAVACHOL) 20 MG tablet, Take 1 tablet by mouth daily, Disp: 90 tablet, Rfl: 1    hydroCHLOROthiazide (MICROZIDE) 12.5 MG capsule, Take 1 capsule by mouth daily, Disp: 90 capsule, Rfl: 1    Biotin 50873 MCG TABS, Take by mouth daily , Disp: , Rfl:     diphenhydrAMINE-APAP, sleep, (TYLENOL PM EXTRA STRENGTH)  MG tablet, Take 2 tablets by mouth nightly as needed for Sleep , Disp: , Rfl:   Allergies   Allergen Reactions    Erythromycin Hives    Penicillin G     Azithromycin     Penicillins Hives    Propoxyphene        Past Medical History:   Diagnosis Date    Anemia     Anemia     Cholelithiasis     Chronic congestion of paranasal sinus     Colon polyps     Depression     Diastolic dysfunction     Diverticulosis     HTN (hypertension)     Hyperlipidemia 2019    Irritable bladder     Lumbar degenerative disc disease     Lumbar herniated disc     Lumbar spinal stenosis     Major depressive disorder with single episode, in remission ([facility] 2019    Osteoarthritis     Overactive bladder     Restless leg syndrome     Restless leg syndrome     RLS (restless legs syndrome) 2019    Ulcerative colitis ([facility]     Ulcerative colitis without complications ([facility]     Valvular heart disease     Vitamin D deficiency     Wrist fracture     Right     Past Surgical History:   Procedure Laterality Date    BLADDER SUSPENSION      BREAST LUMPECTOMY Left     x 2, negative    BUNIONECTOMY      CARDIAC CATHETERIZATION  2012    CARPAL TUNNEL RELEASE Right     CATARACT REMOVAL WITH IMPLANT Right     COLONOSCOPY  2014    HYSTERECTOMY, TOTAL ABDOMINAL      non-cancerous    JOINT REPLACEMENT Bilateral     hip    KNEE ARTHROSCOPY Left     RECTOCELE REPAIR       Family History   Problem Relation Age of Onset    Heart Disease Father          age 80    Diabetes Mother          age 80    Heart Disease Mother         CHF    Other Brother         PVD, obesity    Cancer Paternal Grandmother         breast     Social History     Socioeconomic History    Marital status:      Spouse name: Not on file    Number of children: Not on file    Years of education: Not on file    Highest education level: Not on file   Occupational History    Not on file   Social Needs    Financial resource strain: Not on file    Food insecurity     Worry: Not on file     Inability: Not on file    Transportation needs     Medical: Not on file     Non-medical: Not on file   Tobacco Use    Smoking status: Never Smoker    Smokeless tobacco: Never Used   Substance and Sexual Activity    Alcohol use: No     Alcohol/week: 0.0 standard drinks    Drug use: No    Sexual activity: Not on file   Lifestyle    Physical activity     Days per week: Not on file     Minutes per session: Not on file    Stress: Not on file   Relationships    Social connections     Talks on phone: Not on file     Gets together: Not on file     Attends Orthodoxy service: Not on file     Active member of club or organization: Not on file     Attends meetings of clubs or organizations: Not on file     Relationship status: Not on file    Intimate partner violence     Fear of current or ex partner: Not on file     Emotionally abused: Not on file     Physically abused: Not on file     Forced sexual activity: Not on file   Other Topics Concern    Not on file   Social History Narrative    Not on file       Vitals:    03/17/21 0813   BP: 126/62   Pulse: 52   Temp: 97.4 °F (36.3 °C)   TempSrc: Temporal   SpO2: 98%   Weight: 156 lb 3.2 oz (70.9 kg)   Height: 5' 5\" (1.651 m)       Physical Exam    Constitutional: She is oriented to person, place, and time. She appears well-developed and well-nourished. No distress.   Neck: Normal range of motion. Neck supple. No JVD present. No thyromegaly present. Cardiovascular: Normal rate, regular rhythm, normal heart sounds and intact distal pulses. Exam reveals no gallop and no friction rub.   No murmur heard. Pulmonary/Chest: Effort normal and breath sounds normal. No respiratory distress. She has no wheezes. She has no rales. Abdominal: Soft. Bowel sounds are normal. She exhibits no distension and no mass. There is no tenderness. Musculoskeletal: Normal range of motion. She exhibits no edema.      Neurological: She is alert and oriented to person, place, and time. She displays normal reflexes. No sensory deficit. She exhibits normal muscle tone. Coordination normal.   Skin: Skin is warm and dry. No rash noted. No erythema. --Surgical scar from recent left total knee arthroplasty no evidence of infection. Healing well. Psychiatric: She has a normal mood and affect.  Her behavior is normal.   Nursing note and vitals reviewed. Assessment and Plan:  Tashia Christy was seen today for hypertension. Diagnoses and all orders for this visit:    Essential hypertension  -     potassium chloride (KLOR-CON M) 20 MEQ extended release tablet; Take 1 tablet by mouth daily  Stable on antihypertensive medication    Major depressive disorder with single episode, in remission ([facility]  Stable on SSRI    Hyperlipidemia, unspecified hyperlipidemia type  -     Lipid Panel; Future  Stable on statin medication    Urinary tract infection without hematuria, site unspecified  -     Culture, Urine; Future  -     Urinalysis; Future      Plan: I will see her back in 3 months and as needed. Blood work today to monitor disease progression and medication use. We will also check urine and CNS. Follow-up with orthopedics and GI as directed. No driving until she sees orthopedics. Prescription management performed. Fall precautions. Notify us of problems in the interim. Return in about 3 months (around 6/17/2021). Seen By:  Yina Rolon MD      *Document was created using voice recognition software. Note was reviewed however may contain grammatical errors.

## 2021-03-17 NOTE — PATIENT INSTRUCTIONS
Personalized Preventive Plan for Darci Cardona [place] - 3/17/2021  Medicare offers a range of preventive health benefits. Some of the tests and screenings are paid in full while other may be subject to a deductible, co-insurance, and/or copay. Some of these benefits include a comprehensive review of your medical history including lifestyle, illnesses that may run in your family, and various assessments and screenings as appropriate. After reviewing your medical record and screening and assessments performed today your provider may have ordered immunizations, labs, imaging, and/or referrals for you. A list of these orders (if applicable) as well as your Preventive Care list are included within your After Visit Summary for your review. Other Preventive Recommendations:    · A preventive eye exam performed by an eye specialist is recommended every 1-2 years to screen for glaucoma; cataracts, macular degeneration, and other eye disorders. · A preventive dental visit is recommended every 6 months. · Try to get at least 150 minutes of exercise per week or 10,000 steps per day on a pedometer . · Order or download the FREE \"Exercise & Physical Activity: Your Everyday Guide\" from [employer]. Call 6-203.801.2975 or search [employer] online. · You need 0142-7469 mg of calcium and 4792-9382 IU of vitamin D per day. It is possible to meet your calcium requirement with diet alone, but a vitamin D supplement is usually necessary to meet this goal.  · When exposed to the sun, use a sunscreen that protects against both UVA and UVB radiation with an SPF of 30 or greater. Reapply every 2 to 3 hours or after sweating, drying off with a towel, or swimming. · Always wear a seat belt when traveling in a car. Always wear a helmet when riding a bicycle or motorcycle.

## 2021-03-19 ENCOUNTER — TELEPHONE (OUTPATIENT)
Dept: FAMILY MEDICINE CLINIC | Age: 76
End: 2021-03-19

## 2021-03-19 LAB — URINE CULTURE, ROUTINE: NORMAL

## 2021-06-08 DIAGNOSIS — I10 ESSENTIAL HYPERTENSION: ICD-10-CM

## 2021-06-08 RX ORDER — DILTIAZEM HYDROCHLORIDE 180 MG/1
180 CAPSULE, EXTENDED RELEASE ORAL DAILY
Qty: 90 CAPSULE | Refills: 1 | Status: SHIPPED
Start: 2021-06-08 | End: 2021-08-30 | Stop reason: ALTCHOICE

## 2021-06-08 NOTE — TELEPHONE ENCOUNTER
Name of Medication(s) Requested:  [address] Requested:   humana    Medication(s) pended? [x] Yes  [] No    Last Appointment:  3/17/2021    Future appts:  Future Appointments   Date Time Provider Jay Antonia   6/17/2021  9:30 AM Dequan Castanon  [address]          Does patient need call back?   [] Yes  [x] No    Patient stated that she will not have enough to last until appt next week

## 2021-06-17 ENCOUNTER — TELEPHONE (OUTPATIENT)
Dept: FAMILY MEDICINE CLINIC | Age: 76
End: 2021-06-17

## 2021-06-17 ENCOUNTER — OFFICE VISIT (OUTPATIENT)
Dept: FAMILY MEDICINE CLINIC | Age: 76
End: 2021-06-17
Payer: MEDICARE

## 2021-06-17 VITALS
SYSTOLIC BLOOD PRESSURE: 144 MMHG | WEIGHT: 155.5 LBS | BODY MASS INDEX: 25.91 KG/M2 | DIASTOLIC BLOOD PRESSURE: 70 MMHG | OXYGEN SATURATION: 97 % | HEIGHT: 65 IN | TEMPERATURE: 97.5 F | HEART RATE: 77 BPM

## 2021-06-17 DIAGNOSIS — F32.5 MAJOR DEPRESSIVE DISORDER WITH SINGLE EPISODE, IN REMISSION (HCC): ICD-10-CM

## 2021-06-17 DIAGNOSIS — E55.9 VITAMIN D DEFICIENCY: ICD-10-CM

## 2021-06-17 DIAGNOSIS — I10 ESSENTIAL HYPERTENSION: ICD-10-CM

## 2021-06-17 DIAGNOSIS — R06.09 DOE (DYSPNEA ON EXERTION): ICD-10-CM

## 2021-06-17 DIAGNOSIS — K51.90 ULCERATIVE COLITIS WITHOUT COMPLICATIONS, UNSPECIFIED LOCATION (HCC): ICD-10-CM

## 2021-06-17 DIAGNOSIS — E78.5 HYPERLIPIDEMIA, UNSPECIFIED HYPERLIPIDEMIA TYPE: ICD-10-CM

## 2021-06-17 DIAGNOSIS — R07.9 CHEST PAIN, UNSPECIFIED TYPE: ICD-10-CM

## 2021-06-17 PROBLEM — D68.9 COAGULATION DEFECT (HCC): Status: ACTIVE | Noted: 2021-06-17

## 2021-06-17 LAB
ALBUMIN SERPL-MCNC: 4.8 G/DL (ref 3.5–5.2)
ALP BLD-CCNC: 92 U/L (ref 35–104)
ALT SERPL-CCNC: 6 U/L (ref 0–32)
ANION GAP SERPL CALCULATED.3IONS-SCNC: 12 MMOL/L (ref 7–16)
AST SERPL-CCNC: 20 U/L (ref 0–31)
BASOPHILS ABSOLUTE: 0.01 E9/L (ref 0–0.2)
BASOPHILS RELATIVE PERCENT: 0.2 % (ref 0–2)
BILIRUB SERPL-MCNC: 0.3 MG/DL (ref 0–1.2)
BUN BLDV-MCNC: 19 MG/DL (ref 6–23)
CALCIUM SERPL-MCNC: 9.6 MG/DL (ref 8.6–10.2)
CHLORIDE BLD-SCNC: 101 MMOL/L (ref 98–107)
CO2: 26 MMOL/L (ref 22–29)
CREAT SERPL-MCNC: 0.9 MG/DL (ref 0.5–1)
EOSINOPHILS ABSOLUTE: 0.1 E9/L (ref 0.05–0.5)
EOSINOPHILS RELATIVE PERCENT: 2.1 % (ref 0–6)
GFR AFRICAN AMERICAN: >60
GFR NON-AFRICAN AMERICAN: >60 ML/MIN/1.73
GLUCOSE BLD-MCNC: 80 MG/DL (ref 74–99)
HCT VFR BLD CALC: 38.7 % (ref 34–48)
HEMOGLOBIN: 12.3 G/DL (ref 11.5–15.5)
IMMATURE GRANULOCYTES #: 0.02 E9/L
IMMATURE GRANULOCYTES %: 0.4 % (ref 0–5)
LYMPHOCYTES ABSOLUTE: 1.71 E9/L (ref 1.5–4)
LYMPHOCYTES RELATIVE PERCENT: 36.7 % (ref 20–42)
MCH RBC QN AUTO: 29.7 PG (ref 26–35)
MCHC RBC AUTO-ENTMCNC: 31.8 % (ref 32–34.5)
MCV RBC AUTO: 93.5 FL (ref 80–99.9)
MONOCYTES ABSOLUTE: 0.37 E9/L (ref 0.1–0.95)
MONOCYTES RELATIVE PERCENT: 7.9 % (ref 2–12)
NEUTROPHILS ABSOLUTE: 2.45 E9/L (ref 1.8–7.3)
NEUTROPHILS RELATIVE PERCENT: 52.7 % (ref 43–80)
PDW BLD-RTO: 14.3 FL (ref 11.5–15)
PLATELET # BLD: 182 E9/L (ref 130–450)
PMV BLD AUTO: 10.7 FL (ref 7–12)
POTASSIUM SERPL-SCNC: 4.2 MMOL/L (ref 3.5–5)
RBC # BLD: 4.14 E12/L (ref 3.5–5.5)
SODIUM BLD-SCNC: 139 MMOL/L (ref 132–146)
TOTAL PROTEIN: 7.5 G/DL (ref 6.4–8.3)
VITAMIN D 25-HYDROXY: 46 NG/ML (ref 30–100)
WBC # BLD: 4.7 E9/L (ref 4.5–11.5)

## 2021-06-17 PROCEDURE — 4040F PNEUMOC VAC/ADMIN/RCVD: CPT | Performed by: INTERNAL MEDICINE

## 2021-06-17 PROCEDURE — 1123F ACP DISCUSS/DSCN MKR DOCD: CPT | Performed by: INTERNAL MEDICINE

## 2021-06-17 PROCEDURE — G8427 DOCREV CUR MEDS BY ELIG CLIN: HCPCS | Performed by: INTERNAL MEDICINE

## 2021-06-17 PROCEDURE — G8400 PT W/DXA NO RESULTS DOC: HCPCS | Performed by: INTERNAL MEDICINE

## 2021-06-17 PROCEDURE — 93000 ELECTROCARDIOGRAM COMPLETE: CPT | Performed by: INTERNAL MEDICINE

## 2021-06-17 PROCEDURE — 99215 OFFICE O/P EST HI 40 MIN: CPT | Performed by: INTERNAL MEDICINE

## 2021-06-17 PROCEDURE — G8417 CALC BMI ABV UP PARAM F/U: HCPCS | Performed by: INTERNAL MEDICINE

## 2021-06-17 PROCEDURE — 1090F PRES/ABSN URINE INCON ASSESS: CPT | Performed by: INTERNAL MEDICINE

## 2021-06-17 PROCEDURE — 1036F TOBACCO NON-USER: CPT | Performed by: INTERNAL MEDICINE

## 2021-06-17 RX ORDER — HYDROCHLOROTHIAZIDE 12.5 MG/1
12.5 CAPSULE, GELATIN COATED ORAL DAILY
Qty: 90 CAPSULE | Refills: 1 | Status: SHIPPED
Start: 2021-06-17 | End: 2021-11-22 | Stop reason: SDUPTHER

## 2021-06-17 RX ORDER — POTASSIUM CHLORIDE 20 MEQ/1
20 TABLET, EXTENDED RELEASE ORAL DAILY
Qty: 90 TABLET | Refills: 1 | Status: SHIPPED
Start: 2021-06-17 | End: 2021-11-22 | Stop reason: SDUPTHER

## 2021-06-17 RX ORDER — NITROGLYCERIN 0.4 MG/1
0.4 TABLET SUBLINGUAL EVERY 5 MIN PRN
Qty: 25 TABLET | Refills: 0 | Status: SHIPPED
Start: 2021-06-17 | End: 2021-08-30 | Stop reason: SDUPTHER

## 2021-06-17 RX ORDER — ESCITALOPRAM OXALATE 10 MG/1
10 TABLET ORAL DAILY
Qty: 90 TABLET | Refills: 1 | Status: SHIPPED
Start: 2021-06-17 | End: 2021-11-22 | Stop reason: SDUPTHER

## 2021-06-17 RX ORDER — PRAVASTATIN SODIUM 20 MG
20 TABLET ORAL DAILY
Qty: 90 TABLET | Refills: 1 | Status: SHIPPED
Start: 2021-06-17 | End: 2021-11-22 | Stop reason: SDUPTHER

## 2021-06-17 RX ORDER — LISINOPRIL 20 MG/1
20 TABLET ORAL DAILY
Qty: 90 TABLET | Refills: 1 | Status: SHIPPED
Start: 2021-06-17 | End: 2021-08-30

## 2021-06-17 SDOH — ECONOMIC STABILITY: FOOD INSECURITY: WITHIN THE PAST 12 MONTHS, THE FOOD YOU BOUGHT JUST DIDN'T LAST AND YOU DIDN'T HAVE MONEY TO GET MORE.: NEVER TRUE

## 2021-06-17 SDOH — ECONOMIC STABILITY: FOOD INSECURITY: WITHIN THE PAST 12 MONTHS, YOU WORRIED THAT YOUR FOOD WOULD RUN OUT BEFORE YOU GOT MONEY TO BUY MORE.: NEVER TRUE

## 2021-06-17 ASSESSMENT — SOCIAL DETERMINANTS OF HEALTH (SDOH): HOW HARD IS IT FOR YOU TO PAY FOR THE VERY BASICS LIKE FOOD, HOUSING, MEDICAL CARE, AND HEATING?: NOT HARD AT ALL

## 2021-06-18 ENCOUNTER — TELEPHONE (OUTPATIENT)
Dept: FAMILY MEDICINE CLINIC | Age: 76
End: 2021-06-18

## 2021-06-18 NOTE — TELEPHONE ENCOUNTER
Letter sent to patient letting her know that her labs came back normal. Attached a copy of the results for her records.

## 2021-06-21 ENCOUNTER — TELEPHONE (OUTPATIENT)
Dept: FAMILY MEDICINE CLINIC | Age: 76
End: 2021-06-21

## 2021-06-21 ENCOUNTER — TELEPHONE (OUTPATIENT)
Dept: ADMINISTRATIVE | Age: 76
End: 2021-06-21

## 2021-06-21 NOTE — TELEPHONE ENCOUNTER
Pt called back to let you know that she cancelled the testing with Dr. Katia Arias office and she will be seeing cardiology on 6/30/21.

## 2021-06-21 NOTE — PROGRESS NOTES
[facility]     21  Rohan Magaña : 1945 Sex: female  Age: 67 y.o. Chief Complaint   Patient presents with    Hypertension     3 month follow-up       HPI    Patient presents today for 3-month follow-up visit on her medical problems. She brings in a list of blood pressures because I asked her to start checking more closely. Tells me they have been running good and shows me numbers correlating this. Her depression has been stable on her SSRI. Her lipids have been stable on statin medication which she is tolerating. She is supposed to have EGD and colonoscopy next week with GI. I did review their note. She has had one trip and fall without getting hurt. Comes to me today with a new problem complaining of shortness of breath with exertion as well as chest tightness with exertion she has noticed this walking up an incline to her mailbox. This is just started within the past couple weeks. Rest tends to make it better. She follows with GI and orthopedics       Review of Systems     Constitutional: Negative for activity change, appetite change, chills, diaphoresis, , fever and unexpected weight change.    HENT: Negative for congestion, postnasal drip, rhinorrhea and sinus pain.    Respiratory: Negative for cough,  and wheezing.   Positive for dyspnea on exertion-see above  Cardiovascular: Negative for palpitations and leg swelling. Positive for chest pain-see above  Gastrointestinal: Negative for abdominal pain, blood in stool, constipation, diarrhea, nausea and vomiting.  Does have history of ulcerative colitis. -Quiescent at this point  Endocrine: Negative.    Genitourinary:  Negative for difficulty urinating, dysuria, frequency and hematuria.    No longer seeing urology  Musculoskeletal: Positive for arthralgias. negative for back pain,  and myalgias.       Skin: Negative.    Allergic/Immunologic: Negative for environmental allergies and immunocompromised state. Neurological: Negative for dizziness, weakness, light-headedness, numbness and headaches. Hematological: Negative.    Psychiatric/Behavioral:        Depression has improved.  She is on medication.  Living with her daughter that is working out well  Things have been a little bit more trying with recent COVID-19 pandemic status and her spending more time at home. -Improving           REST OF PERTINENT ROS GONE OVER AND WAS NEGATIVE.      PMH:  Health Maintenance:  Mammogram - (7/9/2018)  Mammogram Screening - (7/9/2018)  Influenza Vaccination - (9/20/2018)  Couseled on Home Safety - (2/26/2018)  Colonoscopy - (1/16/2018)  Colonoscopy Screening - (1/16/2018)  Bone Density Scan - (9/29/2016)  Pneumonia Vaccination - (9/22/2016)  Tetanus Immunization - (9/22/2016)  pt cannot recall if she had this vaccination, no record from pharmacy  Stress Test - 11/11,9/15  heart cath - 2/12-ok  Colonoscopy - 2/14,8/14,1/18-due 21  EKG - 3/14, 11/17  has Gyn - Dr. Severino Cap  2D ECHO - 9/15  Hemmocult Cards - 10/16-neg x 3  EGD - 1/18, 10/19,-due 22  Prevnar Vaccine - (11/3/2015) [facility]  Medical Problems:  Hypertension, Restless Leg Syndrome, irritable bladder, Depression  Ulcerative Colitis - follows with dr Kelsie Sanford  Anemia - follows with dr Evin Galavn Polyps, over active bladder, vit D defic, chronic sinus congestion, Osteoarthritis, Diverticulosis, fx  right wrist, Pfo, Hyperlipidemia, Diastolic Dysfunction, Cholelithiasis, fracture right lateral malleolus,  Valvular Heart Disease, Lumbar DDD, Lumbar Spinal Stenosis, Lumbar herniated disc  Surgical Hx:  DEON - Non-cancerous  Left breast mass removal - x2-benign  Right carpal tunnel surgery, Rectocele repair, Left knee arthroscopy, Bilateral total hip arthroplasty,  Bladder suspension, Bunion surgery, Bilateral cataract surgery, Repair of torn meniscus left knee,  Posterior-lateral fusion L2-5 2/19   Right total knee arthroplasty-2019  Left total knee arthroplasty-2021   old records reviewed  FH:  Father:  Heart Disease -  age 80. Mother:  Non Insulin Dependent Diabetes -  age84  Congestive Heart Failure (CHF). Brother 1:  Obesity, Peripheral Vascular Disease (PVD). Maternal Grandmother:  Breast Cancer. SH:  Marital: Legal Status: . retired   Personal Habits: Cigarette Use: Does Not Smoke. Alcohol: Denies alcohol use                  Current Outpatient Medications:     potassium chloride (KLOR-CON M) 20 MEQ extended release tablet, Take 1 tablet by mouth daily, Disp: 90 tablet, Rfl: 1    hydroCHLOROthiazide (MICROZIDE) 12.5 MG capsule, Take 1 capsule by mouth daily, Disp: 90 capsule, Rfl: 1    escitalopram (LEXAPRO) 10 MG tablet, Take 1 tablet by mouth daily, Disp: 90 tablet, Rfl: 1    lisinopril (PRINIVIL;ZESTRIL) 20 MG tablet, Take 1 tablet by mouth daily, Disp: 90 tablet, Rfl: 1    pravastatin (PRAVACHOL) 20 MG tablet, Take 1 tablet by mouth daily, Disp: 90 tablet, Rfl: 1    nitroGLYCERIN (NITROSTAT) 0.4 MG SL tablet, Place 1 tablet under the tongue every 5 minutes as needed for Chest pain up to max of 3 total doses.  If no relief after 1 dose, call 911., Disp: 25 tablet, Rfl: 0    dilTIAZem (DILACOR XR) 180 MG extended release capsule, Take 1 capsule by mouth daily, Disp: 90 capsule, Rfl: 1    docusate sodium (COLACE) 100 MG capsule, Take 100 mg by mouth 2 times daily, Disp: , Rfl:     vitamin D 25 MCG (1000 UT) CAPS, Take by mouth, Disp: , Rfl:     omeprazole (PRILOSEC) 20 MG delayed release capsule, Take 20 mg by mouth daily, Disp: , Rfl:     Biotin 85485 MCG TABS, Take by mouth daily , Disp: , Rfl:     diphenhydrAMINE-APAP, sleep, (TYLENOL PM EXTRA STRENGTH)  MG tablet, Take 2 tablets by mouth nightly as needed for Sleep , Disp: , Rfl:   Allergies   Allergen Reactions    Erythromycin Hives    Penicillin G     Azithromycin     Penicillins Hives    Propoxyphene        Past Medical History:   Diagnosis Date    Anemia     Anemia     Cholelithiasis     Chronic congestion of paranasal sinus     Colon polyps     Depression     Diastolic dysfunction     Diverticulosis     HTN (hypertension)     Hyperlipidemia 2019    Irritable bladder     Lumbar degenerative disc disease     Lumbar herniated disc     Lumbar spinal stenosis     Major depressive disorder with single episode, in remission ([address] 2019    Osteoarthritis     Overactive bladder     Restless leg syndrome     Restless leg syndrome     RLS (restless legs syndrome) 2019    Ulcerative colitis ([address]     [facility] complications [facility]     Valvular heart disease     Vitamin D deficiency     Wrist fracture     Right     Past Surgical History:   Procedure Laterality Date    BLADDER SUSPENSION      BREAST LUMPECTOMY Left     x 2, negative    BUNIONECTOMY      CARDIAC CATHETERIZATION  2012    CARPAL TUNNEL RELEASE Right     CATARACT REMOVAL WITH IMPLANT Right     COLONOSCOPY  2014    HYSTERECTOMY, TOTAL ABDOMINAL      non-cancerous    JOINT REPLACEMENT Bilateral     hip    KNEE ARTHROSCOPY Left     RECTOCELE REPAIR       Family History   Problem Relation Age of Onset    Heart Disease Father          age 80    Diabetes Mother          age 80    Heart Disease Mother         CHF    Other Brother         PVD, obesity    Cancer Paternal Grandmother         breast     Social History     Socioeconomic History    Marital status:      Spouse name: Not on file    Number of children: Not on file    Years of education: Not on file    Highest education level: Not on file   Occupational History    Not on file   Tobacco Use    Smoking status: Never Smoker    Smokeless tobacco: Never Used   Substance and Sexual Activity    Alcohol use: No     Alcohol/week: 0.0 standard drinks    Drug use: No    Sexual activity: Not on file   Other Topics Concern    Not on file   Social History Narrative    Not on file Social Determinants of Health     Financial Resource Strain: Low Risk     Difficulty of Paying Living Expenses: Not hard at all   Food Insecurity: No Food Insecurity    Worried About Running Out of Food in the Last Year: Never true    [place]ño of Food in the Last Year: Never true   Transportation Needs:     Lack of Transportation (Medical):  Lack of Transportation (Non-Medical):    Physical Activity:     Days of Exercise per Week:     Minutes of Exercise per Session:    Stress:     Feeling of Stress :    Social Connections:     Frequency of Communication with Friends and Family:     Frequency of Social Gatherings with Friends and Family:     Attends Yazdanism Services:     Active Member of Clubs or Organizations:     Attends Club or Organization Meetings:     Marital Status:    Intimate Partner Violence:     Fear of Current or Ex-Partner:     Emotionally Abused:     Physically Abused:     Sexually Abused:        Vitals:    06/17/21 0931   BP: (!) 144/70   Pulse: 77   Temp: 97.5 °F (36.4 °C)   TempSrc: Temporal   SpO2: 97%   Weight: 155 lb 8 oz (70.5 kg)   Height: 5' 5\" (1.651 m)       Physical Exam    Constitutional: She is oriented to person, place, and time. She appears well-developed and well-nourished. No distress.   Neck: Normal range of motion. Neck supple. No JVD present. No thyromegaly present. Cardiovascular: Normal rate, regular rhythm, normal heart sounds and intact distal pulses. Exam reveals no gallop and no friction rub.   No murmur heard. Pulmonary/Chest: Effort normal and breath sounds normal. No respiratory distress. She has no wheezes. She has no rales. Abdominal: Soft. Bowel sounds are normal. She exhibits no distension and no mass. There is no tenderness. Musculoskeletal: Normal range of motion. She exhibits no edema.      Neurological: She is alert and oriented to person, place, and time. She displays normal reflexes. No sensory deficit.  She exhibits normal muscle tone. Coordination normal.   Skin: Skin is warm and dry. No rash noted. No erythema. --Surgical scar from recent left total knee arthroplasty no evidence of infection. Healed well. Psychiatric: She has a normal mood and affect. Her behavior is normal.   Nursing note and vitals reviewed.           Assessment and Plan:  Anthony Diaz was seen today for hypertension. Diagnoses and all orders for this visit:    Chest pain, unspecified type  -     XR CHEST (2 VW); Future  -     EKG 12 Lead; Future  -     [facility], MD, Cardiology, [place]  -     EKG 12 Lead    RDZ (dyspnea on exertion)  -     XR CHEST (2 VW); Future  -     EKG 12 Lead; Future  -     Katalina Tilley MD, Cardiology, [place]  -     EKG 12 Lead    Essential hypertension  -     potassium chloride (KLOR-CON M) 20 MEQ extended release tablet; Take 1 tablet by mouth daily  -     hydroCHLOROthiazide (MICROZIDE) 12.5 MG capsule; Take 1 capsule by mouth daily  -     lisinopril (PRINIVIL;ZESTRIL) 20 MG tablet; Take 1 tablet by mouth daily  -     Comprehensive Metabolic Panel; Future  -     CBC Auto Differential; Future    Hyperlipidemia, unspecified hyperlipidemia type  -     pravastatin (PRAVACHOL) 20 MG tablet; Take 1 tablet by mouth daily    Major depressive disorder with single episode, in remission ([facility] [facility]  -     escitalopram (LEXAPRO) 10 MG tablet; Take 1 tablet by mouth daily    Ulcerative colitis without complications, unspecified location ([facility])    Vitamin D deficiency  -     Vitamin D 25 Hydroxy; Future    Other orders  -     nitroGLYCERIN (NITROSTAT) 0.4 MG SL tablet; Place 1 tablet under the tongue every 5 minutes as needed for Chest pain up to max of 3 total doses. If no relief after 1 dose, call 911. Plan: ECG which I visualized showing ST T wave changes anteroseptal region which look fairly similar to previous. Chest x-ray performed  which I visualized showing no acute infiltrative or volume overload changes.   Blood work today to monitor disease progression and medication use. Prescription management performed including nitroglycerin sublingual with instructions in use. Deferred to cardiology. We will need to hold her scoping this next week until cardiac evaluation takes place. See above body report. Follow with above consultants. I will see back in the next 6 weeks and as needed. Notify us of problems in the interim. To the emergency room over the weekend if any further chest pain. Return in about 6 weeks (around 7/29/2021). Seen By:  Kathrin Leyden, MD      *Document was created using voice recognition software. Note was reviewed however may contain grammatical errors.

## 2021-06-21 NOTE — TELEPHONE ENCOUNTER
Dr Belkis Cui had the patient called to let her know that he is advising her to cancel her EGD and Colonoscopy that she has scheduled for tomorrow due to some cardiac issues. Dr Belkis Cui wanted her to be evaluated by cardiology first.  She refused to have it cancel as she states that she was over half way done with the prep. Dr Belkis Cui got on the phone and let her know the risks if she proceeds with the procedure, she verbalized understanding and is well aware of the risks.

## 2021-06-21 NOTE — TELEPHONE ENCOUNTER
Patient Appointment Form:      PCP: Dr. Nahomy Ac  Referring: Dr. Nahomy Ac    Has the Patient:    Seen a Cardiologist? yes    date:pt can't remember  Physician:Dr. Elisa Chun  location:[facility]    Had a heart catheterization?  yes   date: pt can't remember  Hospital:  pt can't remember    Had heart surgery? no    Had a stress test or nuclear stress test? yes   date: pt can't remember   facility name:  [facility]    Had an echocardiogram? no    Had a vascular ultrasound? no    Had a 24/48 heart monitor or extended cardiac event monitor? no    Had recent blood work in the last 6 months? yes    date: pt can't remember    ordering physician: Daniele Cramer    Had a pacemaker/ICD/ILR implant? no    Seen an Electrophysiologist? no        Will send records via: in [address] [place] [address]      Date & time of appointment:  [email or web address]

## 2021-06-30 ENCOUNTER — OFFICE VISIT (OUTPATIENT)
Dept: CARDIOLOGY CLINIC | Age: 76
End: 2021-06-30
Payer: MEDICARE

## 2021-06-30 VITALS
WEIGHT: 155 LBS | RESPIRATION RATE: 16 BRPM | BODY MASS INDEX: 25.83 KG/M2 | HEIGHT: 65 IN | SYSTOLIC BLOOD PRESSURE: 116 MMHG | DIASTOLIC BLOOD PRESSURE: 68 MMHG | HEART RATE: 70 BPM

## 2021-06-30 DIAGNOSIS — I38 VHD (VALVULAR HEART DISEASE): ICD-10-CM

## 2021-06-30 DIAGNOSIS — I10 ESSENTIAL HYPERTENSION: ICD-10-CM

## 2021-06-30 DIAGNOSIS — E78.00 PURE HYPERCHOLESTEROLEMIA: ICD-10-CM

## 2021-06-30 DIAGNOSIS — Z01.810 PREOP CARDIOVASCULAR EXAM: ICD-10-CM

## 2021-06-30 DIAGNOSIS — R07.2 PRECORDIAL PAIN: Primary | ICD-10-CM

## 2021-06-30 PROCEDURE — 1090F PRES/ABSN URINE INCON ASSESS: CPT | Performed by: INTERNAL MEDICINE

## 2021-06-30 PROCEDURE — G8417 CALC BMI ABV UP PARAM F/U: HCPCS | Performed by: INTERNAL MEDICINE

## 2021-06-30 PROCEDURE — G8400 PT W/DXA NO RESULTS DOC: HCPCS | Performed by: INTERNAL MEDICINE

## 2021-06-30 PROCEDURE — 1123F ACP DISCUSS/DSCN MKR DOCD: CPT | Performed by: INTERNAL MEDICINE

## 2021-06-30 PROCEDURE — 1036F TOBACCO NON-USER: CPT | Performed by: INTERNAL MEDICINE

## 2021-06-30 PROCEDURE — 4040F PNEUMOC VAC/ADMIN/RCVD: CPT | Performed by: INTERNAL MEDICINE

## 2021-06-30 PROCEDURE — G8427 DOCREV CUR MEDS BY ELIG CLIN: HCPCS | Performed by: INTERNAL MEDICINE

## 2021-06-30 PROCEDURE — 99204 OFFICE O/P NEW MOD 45 MIN: CPT | Performed by: INTERNAL MEDICINE

## 2021-06-30 PROCEDURE — 93000 ELECTROCARDIOGRAM COMPLETE: CPT | Performed by: INTERNAL MEDICINE

## 2021-06-30 RX ORDER — ASPIRIN 81 MG/1
81 TABLET ORAL DAILY
COMMUNITY
End: 2021-08-30 | Stop reason: ALTCHOICE

## 2021-06-30 NOTE — PROGRESS NOTES
Patient Active Problem List   Diagnosis    HTN (hypertension)    Chest pain    [facility] complications [facility]    Hyperlipidemia    Major depressive disorder with single episode, in remission ([facility] [facility]    RLS (restless legs syndrome)    Anemia    VHD (valvular heart disease)    Coagulation defect (HCC)       Current Outpatient Medications   Medication Sig Dispense Refill    MELATONIN ER PO Take by mouth nightly      Propylene Glycol-Glycerin (SOOTHE OP) Apply to eye      aspirin 81 MG EC tablet Take 81 mg by mouth daily      potassium chloride (KLOR-CON M) 20 MEQ extended release tablet Take 1 tablet by mouth daily 90 tablet 1    hydroCHLOROthiazide (MICROZIDE) 12.5 MG capsule Take 1 capsule by mouth daily (Patient taking differently: Take 25 mg by mouth daily ) 90 capsule 1    escitalopram (LEXAPRO) 10 MG tablet Take 1 tablet by mouth daily 90 tablet 1    lisinopril (PRINIVIL;ZESTRIL) 20 MG tablet Take 1 tablet by mouth daily 90 tablet 1    pravastatin (PRAVACHOL) 20 MG tablet Take 1 tablet by mouth daily 90 tablet 1    nitroGLYCERIN (NITROSTAT) 0.4 MG SL tablet Place 1 tablet under the tongue every 5 minutes as needed for Chest pain up to max of 3 total doses. If no relief after 1 dose, call 911. 25 tablet 0    dilTIAZem (DILACOR XR) 180 MG extended release capsule Take 1 capsule by mouth daily 90 capsule 1    vitamin D 25 MCG (1000 UT) CAPS Take by mouth      omeprazole (PRILOSEC) 20 MG delayed release capsule Take 20 mg by mouth daily       Biotin 5000 MCG TABS Take by mouth daily       diphenhydrAMINE-APAP, sleep, (TYLENOL PM EXTRA STRENGTH)  MG tablet Take 2 tablets by mouth nightly as needed for Sleep        No current facility-administered medications for this visit. CC:    Patient is seen Initial Evaluation for:  1. Precordial pain    2. Preop cardiovascular exam    3. Essential hypertension    4. Pure hypercholesterolemia    5.  VHD (valvular heart disease) HPI:  Patient is seen for new onset of chest discomfort. Patient relates that 1 month ago she began to develop pressure in her chest with exertion. She notes that when she walks up a hill to get her mail she develops chest pressure. When she stops and rests the discomfort resolves. She describes this discomfort is moderate in severity. There is no associated radiation. She does note accompanying short of breath sensation. There is no radiation of this pain. She has no associated diaphoresis nausea or vomiting.   Additionally, she is scheduled for colonoscopy early in August.    ROS:   General: No unusual weight gain,  change in exercise tolerance  Skin: No rash or itching  EENT: No vision changes or nosebleeds  Cardiovascular: No orthopnea or paroxysmal nocturnal dyspnea  Respiratory: No cough or hemoptysis  Gastrointestinal: No hematemesis or recent changes in bowel habits  Genitourinary: No hematuria, urgency or frequency  Musculoskeletal: No muscular weakness or joint swelling   Neurologic / Psychiatric: No incoordination or convulsions  Allergic / Immunologic/ Lymphatic / Endocrine: No anemia or bleeding tendency    Social History     Socioeconomic History    Marital status:      Spouse name: Not on file    Number of children: Not on file    Years of education: Not on file    Highest education level: Not on file   Occupational History    Not on file   Tobacco Use    Smoking status: Never Smoker    Smokeless tobacco: Never Used   Substance and Sexual Activity    Alcohol use: No     Alcohol/week: 0.0 standard drinks    Drug use: No    Sexual activity: Not on file   Other Topics Concern    Not on file   Social History Narrative    Not on file     Social Determinants of Health     Financial Resource Strain: Low Risk     Difficulty of Paying Living Expenses: Not hard at all   Food Insecurity: No Food Insecurity    Worried About Running Out of Food in the Last Year: Never true    Ran (1.651 m)     HEAD & FACE: Normocephalic. Symmetric. EYES: No xanthelasma. Conjunctivae not injected. EARS, NOSE, MOUTH & THROAT: Good dentition. No oral pallor or cyanosis. NECK: No JVD at 30 degrees. No thyromegaly. RESPIRATORY: Clear to auscultation and percussion in all fields. No use of accessory muscle or intercostal retractions. CARDIOVASCULAR: Regular rate and rhythm. No lifts or thrills on palpitation. Auscultation with normal S1-S2 in intensity and splitting. Grade 1/6 early peaking systolic ejection murmur heard best upper sternal border without radiation. No carotid bruits. Abdominal aorta not enlarged. Femoral arteries without bruits. Pedal pulses 2+. No edema. ABDOMEN: Soft without hepatic or splenic enlargement. No tenderness. MUSCULOSKELETAL: No kyphosis or scoliosis of the back. Good muscle strength and tone. No muscle atrophy. Slow gait and inability to undergo exercise stress testing. EXTREMITIES: No clubbing or cyanosis. SKIN: No Xanthomas or ulcerations. NEUROLOGIC: Oriented to time, place and person. Normal mood and affect. LYMPHATIC:  No palpable neck or supraclavicular nodes. No splenomegaly. EKG: the EKG tracing was reviewed and found to reveal: Normal sinus rhythm. Nonspecific ST-T wave changes. QTc 451 ms. ASSESSMENT:                                                     ORDERS:       Diagnosis Orders   1. Preop cardiovascular exam  EKG 12 lead   2. Essential hypertension     3. Pure hypercholesterolemia     4. VHD (valvular heart disease)     5. Precordial pain  NM Cardiac Stress Test Nuclear Imaging    Cardiac Stress Test - w/Pharm   Will require further evaluation for new onset of exertional chest discomfort. Will require pharmacologic stress testing due to inability to walk on treadmill. PLAN:   See above orders. Medication reconciliation completed.   Old records were reviewed and found to reveal: LDL cholesterol 76 on 3/17/2021. Start aspirin 81 mg daily. Discussed issues that would prompt earlier/ER evaluation. Follow-up office visit - pending above evaluation.

## 2021-07-12 ENCOUNTER — TELEPHONE (OUTPATIENT)
Dept: CARDIOLOGY | Age: 76
End: 2021-07-12

## 2021-07-12 NOTE — TELEPHONE ENCOUNTER
SCHEDULED STRESS TEST FOR 07-13-21. REVIEWED COVID CHECKLIST WITH PATIENT.     Electronically signed by Oma Lopez on 7/12/2021 at 1:57 PM

## 2021-07-13 ENCOUNTER — HOSPITAL ENCOUNTER (OUTPATIENT)
Dept: CARDIOLOGY | Age: 76
Discharge: HOME | End: 2021-07-13
Payer: MEDICARE

## 2021-07-13 VITALS
HEART RATE: 64 BPM | BODY MASS INDEX: 25.49 KG/M2 | HEIGHT: 65 IN | SYSTOLIC BLOOD PRESSURE: 140 MMHG | DIASTOLIC BLOOD PRESSURE: 60 MMHG | WEIGHT: 153 LBS

## 2021-07-13 PROCEDURE — 2580000003 HC RX 258: Performed by: INTERNAL MEDICINE

## 2021-07-13 PROCEDURE — 3430000000 HC RX DIAGNOSTIC RADIOPHARMACEUTICAL: Performed by: INTERNAL MEDICINE

## 2021-07-13 PROCEDURE — 78452 HT MUSCLE IMAGE SPECT MULT: CPT

## 2021-07-13 PROCEDURE — 93017 CV STRESS TEST TRACING ONLY: CPT

## 2021-07-13 PROCEDURE — 6360000002 HC RX W HCPCS: Performed by: INTERNAL MEDICINE

## 2021-07-13 PROCEDURE — A9500 TC99M SESTAMIBI: HCPCS | Performed by: INTERNAL MEDICINE

## 2021-07-13 RX ORDER — SODIUM CHLORIDE 0.9 % (FLUSH) 0.9 %
10 SYRINGE (ML) INJECTION PRN
Status: DISCONTINUED | OUTPATIENT
Start: 2021-07-13 | End: 2021-07-14 | Stop reason: HOSPADM

## 2021-07-13 RX ADMIN — SODIUM CHLORIDE, PRESERVATIVE FREE 10 ML: 5 INJECTION INTRAVENOUS at 11:54

## 2021-07-13 RX ADMIN — Medication 31.1 MILLICURIE: at 11:54

## 2021-07-13 RX ADMIN — SODIUM CHLORIDE, PRESERVATIVE FREE 10 ML: 5 INJECTION INTRAVENOUS at 09:36

## 2021-07-13 RX ADMIN — SODIUM CHLORIDE, PRESERVATIVE FREE 10 ML: 5 INJECTION INTRAVENOUS at 11:55

## 2021-07-13 RX ADMIN — Medication 10.3 MILLICURIE: at 09:36

## 2021-07-13 RX ADMIN — REGADENOSON 0.4 MG: 0.08 INJECTION, SOLUTION INTRAVENOUS at 11:54

## 2021-07-13 NOTE — PROCEDURES
[address] [facility] [address] [phone]                Pharmacologic Stress Nuclear Gated SPECT Study    Name: Jared Payan Account Number: [de-identified]    :  1945          Sex: female         Date of Study:  2021    Height: 5' 5\" (165.1 cm)         Weight: 153 lb (69.4 kg)     Ordering Provider: Laura Mendiola MD          PCP: Douglas Vila MD      Cardiologist:           Interpreting Physician: Walt Jacinto MD  _________________________________________________________________________________    Indication:   Detecting the presence and location of coronary artery disease    Clinical History:   Patient has no known history of coronary artery disease. Procedure:   Pharmacologic stress testing was performed with regadenoson 0.4 mg for 15 seconds. Additionally, low-level exercise was performed along with the infusion. The heart rate was 64 at baseline and rasta to 71 beats during the infusion. This corresponds to 71% of maximum predicted heart rate. The blood pressure at baseline was 140/60 and blood pressure at the end of infusion was 128/64. Blood pressure response was normal during the stress procedure. The patient experienced \"hard to breath\" feeling  during the infusion. Which subsided in recovery     ECG during the infusion did not change. IMAGING: Myocardial perfusion imaging was performed at rest 30-35 minutes following the intravenous injection of 10.3 mCi of (Tc-Sestamibi) followed by 10 ml of Normal Saline. As per infusion protocol, the patient was injected intravenously with 31.1 mCi of (Tc-Sestamibi) followed by 10 ml of Normal Saline. Gated post-stress tomographic imaging was performed 20-25 minutes after stress. FINDINGS: The overall quality of the study was excellent.      Left ventricular cavity size was noted to be normal.    Rotational analog analysis demonstrated no patient motion or abnormal extracardiac radioactivity. The gated SPECT stress imaging in the short, vertical long, and horizontal long axis demonstrated normal homogeneous tracer distribution throughout the myocardium. Prominent apical thinning noted         The resting images show no change. Gated SPECT left ventricular ejection fraction was calculated to be 65%, with normal myocardial thickening and wall motion. Impression:    1. ECG during the infusion did not change. 2. The myocardial perfusion imaging was normal.    3. Overall left ventricular systolic function was normal without regional wall motion abnormalities. 4. Low risk general pharmacologic nuclear stress test.    Thank you for sending your patient to this [facility].      Electronically signed by Anastasia Arroyo MD on 7/13/21 at 2:45 PM EDT

## 2021-07-14 ENCOUNTER — OFFICE VISIT (OUTPATIENT)
Dept: FAMILY MEDICINE CLINIC | Age: 76
End: 2021-07-14
Payer: MEDICARE

## 2021-07-14 ENCOUNTER — TELEPHONE (OUTPATIENT)
Dept: ADMINISTRATIVE | Age: 76
End: 2021-07-14

## 2021-07-14 ENCOUNTER — NURSE TRIAGE (OUTPATIENT)
Dept: OTHER | Facility: CLINIC | Age: 76
End: 2021-07-14

## 2021-07-14 ENCOUNTER — TELEPHONE (OUTPATIENT)
Dept: CARDIOLOGY CLINIC | Age: 76
End: 2021-07-14

## 2021-07-14 VITALS
HEART RATE: 79 BPM | OXYGEN SATURATION: 100 % | SYSTOLIC BLOOD PRESSURE: 120 MMHG | HEIGHT: 65 IN | TEMPERATURE: 98 F | BODY MASS INDEX: 25.49 KG/M2 | WEIGHT: 153 LBS | DIASTOLIC BLOOD PRESSURE: 64 MMHG

## 2021-07-14 DIAGNOSIS — I10 ESSENTIAL HYPERTENSION: ICD-10-CM

## 2021-07-14 DIAGNOSIS — R42 DIZZINESS: Primary | ICD-10-CM

## 2021-07-14 DIAGNOSIS — R06.09 DOE (DYSPNEA ON EXERTION): ICD-10-CM

## 2021-07-14 PROBLEM — D68.9 COAGULATION DEFECT (HCC): Status: RESOLVED | Noted: 2021-06-17 | Resolved: 2021-07-14

## 2021-07-14 PROCEDURE — 1036F TOBACCO NON-USER: CPT | Performed by: INTERNAL MEDICINE

## 2021-07-14 PROCEDURE — G8417 CALC BMI ABV UP PARAM F/U: HCPCS | Performed by: INTERNAL MEDICINE

## 2021-07-14 PROCEDURE — 4040F PNEUMOC VAC/ADMIN/RCVD: CPT | Performed by: INTERNAL MEDICINE

## 2021-07-14 PROCEDURE — 99213 OFFICE O/P EST LOW 20 MIN: CPT | Performed by: INTERNAL MEDICINE

## 2021-07-14 PROCEDURE — G8400 PT W/DXA NO RESULTS DOC: HCPCS | Performed by: INTERNAL MEDICINE

## 2021-07-14 PROCEDURE — 1123F ACP DISCUSS/DSCN MKR DOCD: CPT | Performed by: INTERNAL MEDICINE

## 2021-07-14 PROCEDURE — G8427 DOCREV CUR MEDS BY ELIG CLIN: HCPCS | Performed by: INTERNAL MEDICINE

## 2021-07-14 PROCEDURE — 1090F PRES/ABSN URINE INCON ASSESS: CPT | Performed by: INTERNAL MEDICINE

## 2021-07-14 NOTE — TELEPHONE ENCOUNTER
denies any other questions or concerns; instructed to call back for any new or worsening symptoms. Writer provided warm transfer to Aki at [facility] for appointment scheduling. Attention Provider: Thank you for allowing me to participate in the care of your patient. The patient was connected to triage in response to information provided to the ECC. Please do not respond through this encounter as the response is not directed to a shared pool.

## 2021-07-14 NOTE — TELEPHONE ENCOUNTER
Received call back from [facility] with Nurse Access, new onset of dizziness, had a stress test yesterday; disposition to be seen today; appt scheduled

## 2021-08-02 ENCOUNTER — TELEPHONE (OUTPATIENT)
Dept: NON INVASIVE DIAGNOSTICS | Age: 76
End: 2021-08-02

## 2021-08-02 ENCOUNTER — OFFICE VISIT (OUTPATIENT)
Dept: FAMILY MEDICINE CLINIC | Age: 76
End: 2021-08-02
Payer: MEDICARE

## 2021-08-02 VITALS
HEART RATE: 69 BPM | TEMPERATURE: 97.7 F | SYSTOLIC BLOOD PRESSURE: 124 MMHG | BODY MASS INDEX: 25.56 KG/M2 | WEIGHT: 153.4 LBS | OXYGEN SATURATION: 99 % | HEIGHT: 65 IN | DIASTOLIC BLOOD PRESSURE: 62 MMHG

## 2021-08-02 DIAGNOSIS — R42 DIZZINESS: ICD-10-CM

## 2021-08-02 DIAGNOSIS — E78.5 HYPERLIPIDEMIA, UNSPECIFIED HYPERLIPIDEMIA TYPE: ICD-10-CM

## 2021-08-02 DIAGNOSIS — R42 DIZZINESS: Primary | ICD-10-CM

## 2021-08-02 DIAGNOSIS — K51.90 ULCERATIVE COLITIS WITHOUT COMPLICATIONS, UNSPECIFIED LOCATION (HCC): ICD-10-CM

## 2021-08-02 DIAGNOSIS — R07.89 CHEST TIGHTNESS: ICD-10-CM

## 2021-08-02 DIAGNOSIS — F32.5 MAJOR DEPRESSIVE DISORDER WITH SINGLE EPISODE, IN REMISSION (HCC): ICD-10-CM

## 2021-08-02 DIAGNOSIS — R26.9 GAIT DISTURBANCE: ICD-10-CM

## 2021-08-02 DIAGNOSIS — I10 ESSENTIAL HYPERTENSION: ICD-10-CM

## 2021-08-02 PROCEDURE — 4040F PNEUMOC VAC/ADMIN/RCVD: CPT | Performed by: INTERNAL MEDICINE

## 2021-08-02 PROCEDURE — G8400 PT W/DXA NO RESULTS DOC: HCPCS | Performed by: INTERNAL MEDICINE

## 2021-08-02 PROCEDURE — 99214 OFFICE O/P EST MOD 30 MIN: CPT | Performed by: INTERNAL MEDICINE

## 2021-08-02 PROCEDURE — 1090F PRES/ABSN URINE INCON ASSESS: CPT | Performed by: INTERNAL MEDICINE

## 2021-08-02 PROCEDURE — 1036F TOBACCO NON-USER: CPT | Performed by: INTERNAL MEDICINE

## 2021-08-02 PROCEDURE — G8427 DOCREV CUR MEDS BY ELIG CLIN: HCPCS | Performed by: INTERNAL MEDICINE

## 2021-08-02 PROCEDURE — G8417 CALC BMI ABV UP PARAM F/U: HCPCS | Performed by: INTERNAL MEDICINE

## 2021-08-02 PROCEDURE — 1123F ACP DISCUSS/DSCN MKR DOCD: CPT | Performed by: INTERNAL MEDICINE

## 2021-08-03 ENCOUNTER — TELEPHONE (OUTPATIENT)
Dept: FAMILY MEDICINE CLINIC | Age: 76
End: 2021-08-03

## 2021-08-03 LAB
TSH SERPL DL<=0.05 MIU/L-ACNC: 2.45 UIU/ML (ref 0.27–4.2)
VITAMIN B-12: 305 PG/ML (ref 211–946)

## 2021-08-03 NOTE — PROGRESS NOTES
[facility]     8/3/21  Kavita Head : 1945 Sex: female  Age: 67 y.o. Chief Complaint   Patient presents with    Dizziness     6 week follow-up; still having quite a bit of dizziness       HPI    Patient presents today for follow-up visit on her medical problems. She still continues to complain of dizziness. Is been going on she states over the last couple months feels lightheaded does not describe vertigo at this time. States she feels off balance as well. Walking brings this on. She did have relatively recent stress test and saw cardiology. Stress testing was negative. She does have another upcoming appointment with them which I encouraged her to keep. She did have postponement of colonoscopy/EGD because of the chest pain but now was rescheduled for the ninth of this month. She denies dizziness with change of position or getting up quickly. States is primarily when she is walking. Denies other neurological symptoms except tells me that she does many times have difficulty getting words out but this been going on for some time she states. Still describes some fullness or tightness in the chest occasionally. Tells me blood pressures been in a good range. Lipids have been stable on her statin medication. Depression has been okay on her SSRI. GERD is stable on PPI. She continues to live with her daughter and arrangements have been good. Denies any falls. She follows with GI, orthopedics and now cardiology    Review of Systems   Constitutional: Negative for activity change, appetite change, chills, diaphoresis, , fever and unexpected weight change.    HENT: Does admit to sinus congestion drainage fullness.   Respiratory: Negative for cough,  and wheezing.   Positive for dyspnea on exertion  Cardiovascular: Negative for palpitations and leg swelling.  Positive for chest tightness-seeing cardiology and recent negative stress test   gastrointestinal: Negative for abdominal pain, blood in stool, constipation, diarrhea, nausea and vomiting.  Does have history of ulcerative colitis. -Quiescent at this point  Endocrine: Negative.    Genitourinary:  Negative for difficulty urinating, dysuria, frequency and hematuria.    No longer seeing urology  Musculoskeletal: Positive for arthralgias. negative for back pain,  and myalgias.       Skin: Negative.    Allergic/Immunologic: Negative for environmental allergies and immunocompromised state. Neurological: Negative for, weakness, light-headedness, numbness and headaches. Hematological: Negative.  Positive for dizziness/lightheadedness  Psychiatric/Behavioral:        Depression has improved.  She is on medication.  Living with her daughter that is working out well  Things have been a little bit more trying with recent COVID-19 pandemic status and her spending more time at home. -Improving            REST OF PERTINENT ROS GONE OVER AND WAS NEGATIVE.      PMH:  Health Maintenance:  Mammogram - (7/9/2018)  Mammogram Screening - (7/9/2018)  Influenza Vaccination - (9/20/2018)  Couseled on Home Safety - (2/26/2018)  Colonoscopy - (1/16/2018)  Colonoscopy Screening - (1/16/2018)  Bone Density Scan - (9/29/2016)  Pneumonia Vaccination - (9/22/2016)  Tetanus Immunization - (9/22/2016)  pt cannot recall if she had this vaccination, no record from pharmacy  Stress Test - 11/11,9/15, 7/21-negative  heart cath - 2/12-ok  Colonoscopy - 2/14,8/14,1/18-due 21  EKG - 3/14, 11/17  has Gyn - Dr. Cody Schmitt  2D ECHO - 9/15  Hemmocult Cards - 10/16-neg x 3  EGD - 1/18, 10/19,-due 22  Prevnar Vaccine - (11/3/2015) [facility]  Medical Problems:  Hypertension, Restless Leg Syndrome, irritable bladder, Depression  Ulcerative Colitis - follows with dr Mi Robles  Anemia - follows with dr Tadeo Pensukh Polyps, over active bladder, vit D defic, chronic sinus congestion, Osteoarthritis, Diverticulosis, fx  right wrist, Pfo, Hyperlipidemia, Diastolic Dysfunction, Cholelithiasis, fracture right lateral malleolus,  Valvular Heart Disease, Lumbar DDD, Lumbar Spinal Stenosis, Lumbar herniated disc  Surgical Hx:  DEON - Non-cancerous  Left breast mass removal - x2-benign  Right carpal tunnel surgery, Rectocele repair, Left knee arthroscopy, Bilateral total hip arthroplasty,  Bladder suspension, Bunion surgery, Bilateral cataract surgery, Repair of torn meniscus left knee,  Posterior-lateral fusion L2-5    Right total knee arthroplasty-  Left total knee arthroplasty-   old records reviewed  FH:  Father:  Heart Disease -  age 80. Mother:  Non Insulin Dependent Diabetes -  age82  Congestive Heart Failure (CHF). Brother 1:  Obesity, Peripheral Vascular Disease (PVD). Maternal Grandmother:  Breast Cancer. SH:  Marital: Legal Status: . retired   Personal Habits: Cigarette Use: Does Not Smoke. Alcohol: Denies alcohol use                Current Outpatient Medications:     Fexofenadine-Pseudoephedrine (ALLEGRA-D 24 HOUR PO), Take by mouth, Disp: , Rfl:     MELATONIN ER PO, Take by mouth nightly, Disp: , Rfl:     Propylene Glycol-Glycerin (SOOTHE OP), Apply to eye, Disp: , Rfl:     aspirin 81 MG EC tablet, Take 81 mg by mouth daily, Disp: , Rfl:     potassium chloride (KLOR-CON M) 20 MEQ extended release tablet, Take 1 tablet by mouth daily, Disp: 90 tablet, Rfl: 1    hydroCHLOROthiazide (MICROZIDE) 12.5 MG capsule, Take 1 capsule by mouth daily, Disp: 90 capsule, Rfl: 1    escitalopram (LEXAPRO) 10 MG tablet, Take 1 tablet by mouth daily, Disp: 90 tablet, Rfl: 1    lisinopril (PRINIVIL;ZESTRIL) 20 MG tablet, Take 1 tablet by mouth daily, Disp: 90 tablet, Rfl: 1    pravastatin (PRAVACHOL) 20 MG tablet, Take 1 tablet by mouth daily, Disp: 90 tablet, Rfl: 1    nitroGLYCERIN (NITROSTAT) 0.4 MG SL tablet, Place 1 tablet under the tongue every 5 minutes as needed for Chest pain up to max of 3 total doses.  If no relief after 1 dose, call 911., Disp: 25 tablet, Rfl: 0    dilTIAZem (DILACOR XR) 180 MG extended release capsule, Take 1 capsule by mouth daily, Disp: 90 capsule, Rfl: 1    vitamin D 25 MCG (1000 UT) CAPS, Take by mouth, Disp: , Rfl:     omeprazole (PRILOSEC) 20 MG delayed release capsule, Take 20 mg by mouth daily , Disp: , Rfl:     Biotin 5000 MCG TABS, Take by mouth daily , Disp: , Rfl:   Allergies   Allergen Reactions    Erythromycin Hives    Penicillin G     Azithromycin     Penicillins Hives    Propoxyphene        Past Medical History:   Diagnosis Date    Anemia     Cholelithiasis     Chronic congestion of paranasal sinus     Colon polyps     Depression     Diastolic dysfunction     Diverticulosis     HTN (hypertension)     Hyperlipidemia 2019    Irritable bladder     Lumbar degenerative disc disease     Lumbar herniated disc     Lumbar spinal stenosis     Major depressive disorder with single episode, in remission ([facility] 2019    Osteoarthritis     Overactive bladder     RLS (restless legs syndrome) 2019    Ulcerative colitis ([facility]     Ulcerative colitis without complications ([facility]     Valvular heart disease     Vitamin D deficiency     Wrist fracture     Right     Past Surgical History:   Procedure Laterality Date    BLADDER SUSPENSION      BREAST LUMPECTOMY Left     x 2, negative    BUNIONECTOMY      CARDIAC CATHETERIZATION  2012    CARPAL TUNNEL RELEASE Right     CATARACT REMOVAL WITH IMPLANT Right     COLONOSCOPY  2014    HYSTERECTOMY, TOTAL ABDOMINAL      non-cancerous    JOINT REPLACEMENT Bilateral     hip    KNEE ARTHROSCOPY Left     RECTOCELE REPAIR       Family History   Problem Relation Age of Onset    Heart Disease Father          age 80    Diabetes Mother          age 80    Heart Disease Mother         CHF    Other Brother         PVD, obesity    Cancer Paternal Grandmother         breast     Social History     Socioeconomic History    Marital status:      Spouse name: Not on file    Number of children: Not on file    Years of education: Not on file    Highest education level: Not on file   Occupational History    Not on file   Tobacco Use    Smoking status: Never Smoker    Smokeless tobacco: Never Used   Substance and Sexual Activity    Alcohol use: No     Alcohol/week: 0.0 standard drinks    Drug use: No    Sexual activity: Not on file   Other Topics Concern    Not on file   Social History Narrative    Not on file     Social Determinants of Health     Financial Resource Strain: Low Risk     Difficulty of Paying Living Expenses: Not hard at all   Food Insecurity: No Food Insecurity    Worried About [address] in the Last Year: Never true    [facility] in the Last Year: Never true   Transportation Needs:     Lack of Transportation (Medical):  Lack of Transportation (Non-Medical):    Physical Activity:     Days of Exercise per Week:     Minutes of Exercise per Session:    Stress:     Feeling of Stress :    Social Connections:     Frequency of Communication with Friends and Family:     Frequency of Social Gatherings with Friends and Family:     Attends [employer] Services:     Active Member of Clubs or Organizations:     Attends Club or Organization Meetings:     Marital Status:    Intimate Partner Violence:     Fear of Current or Ex-Partner:     Emotionally Abused:     Physically Abused:     Sexually Abused:        Vitals:    08/02/21 1110   BP: 124/62   Pulse: 69   Temp: 97.7 °F (36.5 °C)   TempSrc: Temporal   SpO2: 99%   Weight: 153 lb 6.4 oz (69.6 kg)   Height: 5' 5\" (1.651 m)       Physical Exam    Constitutional: She is oriented to person, place, and time. She appears well-developed and well-nourished. No distress.   HEENT TMs visualized bilaterally looked okay. Sinuses nontender. Mouth no lesions noted. She did have clear mucus draining posterior pharynx.  Eyes pupils round equal reactive to light and accommodation. Extraocular muscle intact grossly. Neck: Normal range of motion. Neck supple. No JVD present. No thyromegaly present. Cardiovascular: Normal rate, regular rhythm, normal heart sounds and intact distal pulses. Exam reveals no gallop and no friction rub.   No murmur heard. Pulmonary/Chest: Effort normal and breath sounds normal. No respiratory distress. She has no wheezes. She has no rales. Abdominal: Soft. Bowel sounds are normal. She exhibits no distension and no mass. There is no tenderness. Musculoskeletal: Normal range of motion. She exhibits no edema.      Neurological: She is alert and oriented to person, place, and time. She displays normal reflexes. No sensory deficit. She exhibits normal muscle tone. Coordination normal.   Skin: Skin is warm and dry. No rash noted. No erythema. Psychiatric: She has a normal mood and affect. Her behavior is normal.   Nursing note and vitals reviewed.           Assessment and Plan:  Annmarie Calvillo was seen today for dizziness. Diagnoses and all orders for this visit:    Dizziness  -     TSH without Reflex; Future  -     VITAMIN B12; Future  -     Cancel: MRI BRAIN WO CONTRAST; Future  -     Holter Monitor 48 Hour; Future  -     MRI BRAIN WO CONTRAST; Future  Work-up in progress    Hyperlipidemia, unspecified hyperlipidemia type  Stable on statin medication    Major depressive disorder with single episode, in remission ([facility]  Stable on SSRI    Ulcerative colitis without complications, unspecified location [facility])  Currently quiescent into for upcoming scoping's    Essential hypertension  Stable on current antihypertensive medication. Chest tightness  -     Holter Monitor 48 Hour; Future    Gait disturbance  -     MRI BRAIN WO CONTRAST; Future    Plan: I did do orthostatic vitals on her pressure lying was 146/60 standing was 130/60. She had no symptoms with this. I did have her walk down the [place] and she did feel a little bit lightheaded she states.   I am going to check a B12 and TSH on her today. I did review her recent blood work with her. Set her up for MRI of the brain and 48-hour Holter monitor. She is to follow-up with above consultants including cardiology. I will see her back in 2 weeks and as needed. Fall precautions. Possible neurological referral at that time if still having symptoms. Continue to monitor blood pressure closely. Return in about 2 weeks (around 8/16/2021). Seen By:  Karri Ch MD      *Document was created using voice recognition software. Note was reviewed however may contain grammatical errors.

## 2021-08-04 ENCOUNTER — NURSE ONLY (OUTPATIENT)
Dept: NON INVASIVE DIAGNOSTICS | Age: 76
End: 2021-08-04

## 2021-08-04 NOTE — PROGRESS NOTES
Patient was seen in Office today to have 48 hour Holter Monitor put on per Dr. Rubi Vazquez. Number [id number].  Electronically signed by Louis Busch MA on 8/4/2021 at 1:11 PM

## 2021-08-09 DIAGNOSIS — R07.89 CHEST TIGHTNESS: ICD-10-CM

## 2021-08-09 DIAGNOSIS — R42 DIZZINESS: ICD-10-CM

## 2021-08-11 ENCOUNTER — TELEPHONE (OUTPATIENT)
Dept: FAMILY MEDICINE CLINIC | Age: 76
End: 2021-08-11

## 2021-08-11 PROBLEM — R42 LIGHTHEADEDNESS: Status: ACTIVE | Noted: 2021-08-11

## 2021-08-11 NOTE — TELEPHONE ENCOUNTER
I spoke with Dr. Alecia Rios of cardiology who is going to see this patient tomorrow. I did explain the situation to him the patient was complaining of some lightheadedness and dizziness and I did initiate about 48-hour Holter monitor. I will do the monitor and did not find any thing of major significance however electrophysiology has read as underlying rhythm sinus/atrial fibrillation. I could not find atrial fibrillation but I did asked Dr. Seth Pate who will see her tomorrow to look at over and decide if she needs treated for this. He was gracious enough to do so.

## 2021-08-12 ENCOUNTER — TELEPHONE (OUTPATIENT)
Dept: FAMILY MEDICINE CLINIC | Age: 76
End: 2021-08-12

## 2021-08-12 ENCOUNTER — OFFICE VISIT (OUTPATIENT)
Dept: CARDIOLOGY CLINIC | Age: 76
End: 2021-08-12
Payer: MEDICARE

## 2021-08-12 VITALS
DIASTOLIC BLOOD PRESSURE: 64 MMHG | WEIGHT: 152.5 LBS | HEART RATE: 68 BPM | SYSTOLIC BLOOD PRESSURE: 126 MMHG | BODY MASS INDEX: 25.41 KG/M2 | RESPIRATION RATE: 16 BRPM | HEIGHT: 65 IN

## 2021-08-12 DIAGNOSIS — R42 LIGHTHEADEDNESS: ICD-10-CM

## 2021-08-12 DIAGNOSIS — I10 ESSENTIAL HYPERTENSION: ICD-10-CM

## 2021-08-12 DIAGNOSIS — E78.00 PURE HYPERCHOLESTEROLEMIA: ICD-10-CM

## 2021-08-12 DIAGNOSIS — I48.91 NEW ONSET ATRIAL FIBRILLATION (HCC): Primary | ICD-10-CM

## 2021-08-12 DIAGNOSIS — I48.0 PAF (PAROXYSMAL ATRIAL FIBRILLATION) (HCC): ICD-10-CM

## 2021-08-12 PROCEDURE — 1036F TOBACCO NON-USER: CPT | Performed by: INTERNAL MEDICINE

## 2021-08-12 PROCEDURE — 1123F ACP DISCUSS/DSCN MKR DOCD: CPT | Performed by: INTERNAL MEDICINE

## 2021-08-12 PROCEDURE — G8400 PT W/DXA NO RESULTS DOC: HCPCS | Performed by: INTERNAL MEDICINE

## 2021-08-12 PROCEDURE — G8417 CALC BMI ABV UP PARAM F/U: HCPCS | Performed by: INTERNAL MEDICINE

## 2021-08-12 PROCEDURE — 99214 OFFICE O/P EST MOD 30 MIN: CPT | Performed by: INTERNAL MEDICINE

## 2021-08-12 PROCEDURE — 4040F PNEUMOC VAC/ADMIN/RCVD: CPT | Performed by: INTERNAL MEDICINE

## 2021-08-12 PROCEDURE — G8427 DOCREV CUR MEDS BY ELIG CLIN: HCPCS | Performed by: INTERNAL MEDICINE

## 2021-08-12 PROCEDURE — 1090F PRES/ABSN URINE INCON ASSESS: CPT | Performed by: INTERNAL MEDICINE

## 2021-08-12 PROCEDURE — 93000 ELECTROCARDIOGRAM COMPLETE: CPT | Performed by: INTERNAL MEDICINE

## 2021-08-12 RX ORDER — CYANOCOBALAMIN (VITAMIN B-12) 1000 MCG
TABLET ORAL DAILY
COMMUNITY
End: 2022-10-24 | Stop reason: CLARIF

## 2021-08-12 NOTE — PROGRESS NOTES
Patient Active Problem List   Diagnosis    HTN (hypertension)    Chest pain    [facility] complications [facility]    Hyperlipidemia    Major depressive disorder with single episode, in remission ([facility]    RLS (restless legs syndrome)    Anemia    VHD (valvular heart disease)    Lightheadedness       Current Outpatient Medications   Medication Sig Dispense Refill    Cyanocobalamin (B-12) 1000 MCG TABS Take by mouth daily      Fexofenadine-Pseudoephedrine (ALLEGRA-D 24 HOUR PO) Take by mouth      MELATONIN ER PO Take by mouth nightly      Propylene Glycol-Glycerin (SOOTHE OP) Apply to eye      aspirin 81 MG EC tablet Take 81 mg by mouth daily      potassium chloride (KLOR-CON M) 20 MEQ extended release tablet Take 1 tablet by mouth daily 90 tablet 1    hydroCHLOROthiazide (MICROZIDE) 12.5 MG capsule Take 1 capsule by mouth daily 90 capsule 1    escitalopram (LEXAPRO) 10 MG tablet Take 1 tablet by mouth daily 90 tablet 1    lisinopril (PRINIVIL;ZESTRIL) 20 MG tablet Take 1 tablet by mouth daily 90 tablet 1    pravastatin (PRAVACHOL) 20 MG tablet Take 1 tablet by mouth daily 90 tablet 1    dilTIAZem (DILACOR XR) 180 MG extended release capsule Take 1 capsule by mouth daily 90 capsule 1    vitamin D 25 MCG (1000 UT) CAPS Take by mouth daily       omeprazole (PRILOSEC) 20 MG delayed release capsule Take 20 mg by mouth daily       nitroGLYCERIN (NITROSTAT) 0.4 MG SL tablet Place 1 tablet under the tongue every 5 minutes as needed for Chest pain up to max of 3 total doses. If no relief after 1 dose, call 911. (Patient not taking: Reported on 8/12/2021) 25 tablet 0    Biotin 5000 MCG TABS Take by mouth daily  (Patient not taking: Reported on 8/12/2021)       No current facility-administered medications for this visit. CC:    Patient is seen for new problem of New Onset A fib and in follow up for:  1. New onset atrial fibrillation ([facility]    2. Essential hypertension    3.  Pure Frequency of Communication with Friends and Family:     Frequency of Social Gatherings with Friends and Family:     Attends Voodoo Services:     Active Member of Clubs or Organizations:     Attends Club or Organization Meetings:     Marital Status:    Intimate Partner Violence:     Fear of Current or Ex-Partner:     Emotionally Abused:     Physically Abused:     Sexually Abused:        Family History   Problem Relation Age of Onset    Heart Disease Father          age 80    Diabetes Mother          age 80    Heart Disease Mother         CHF    Other Brother         PVD, obesity    Cancer Paternal Grandmother         breast       Past Medical History:   Diagnosis Date    Anemia     Cholelithiasis     Chronic congestion of paranasal sinus     Colon polyps     Depression     Diastolic dysfunction     Diverticulosis     HTN (hypertension)     Hyperlipidemia 2019    Irritable bladder     Lumbar degenerative disc disease     Lumbar herniated disc     Lumbar spinal stenosis     Major depressive disorder with single episode, in remission ([facility] 2019    Osteoarthritis     Overactive bladder     RLS (restless legs syndrome) 2019    Ulcerative colitis ([facility]     Ulcerative colitis without complications ([facility]     Valvular heart disease     Vitamin D deficiency     Wrist fracture     Right       PHYSICAL EXAM:  CONSTITUTIONAL:  Well developed, well nourished    Vitals:    21 1016   BP: 126/64   Site: Left Upper Arm   Position: Sitting   Cuff Size: Medium Adult   Pulse: 68   Resp: 16   Weight: 152 lb 8 oz (69.2 kg)   Height: 5' 5\" (1.651 m)     HEAD & FACE: Normocephalic. Symmetric. EYES: No xanthelasma. Conjunctivae not injected. EARS, NOSE, MOUTH & THROAT: Good dentition. No oral pallor or cyanosis. NECK: No JVD at 30 degrees. No thyromegaly. RESPIRATORY: Clear to auscultation and percussion in all fields.   No use of accessory muscle or intercostal retractions. CARDIOVASCULAR: Regular rate and rhythm. No lifts or thrills on palpitation. Auscultation with normal S1-S2 in intensity and splitting. No carotid bruits. Abdominal aorta not enlarged. Femoral arteries without bruits. Pedal pulses 2+. No edema. ABDOMEN: Soft without hepatic or splenic enlargement. No tenderness. MUSCULOSKELETAL: No kyphosis or scoliosis of the back. Good muscle strength and tone. No muscle atrophy. Normal gait and ability to undergo exercise stress testing. EXTREMITIES: No clubbing or cyanosis. SKIN: No Xanthomas or ulcerations. NEUROLOGIC: Oriented to time, place and person. Normal mood and affect. LYMPHATIC:  No palpable neck or supraclavicular nodes. No splenomegaly. EKG: the EKG tracing was reviewed and found to reveal: Normal sinus rhythm. No change compared to prior tracing. ASSESSMENT:                                                     ORDERS:       Diagnosis Orders   1. New onset atrial fibrillation ([address]     2. Essential hypertension  EKG 12 lead   3. Pure hypercholesterolemia     4. Lightheadedness     5. PAF (paroxysmal atrial fibrillation) (HCC)       New Onset A fib/Symptomatic    PLAN:   See above orders. Medication reconciliation completed. Old records were reviewed and found to reveal: Hx of bleeding disorder. Await Brain MRI results before initiating anticoagulation. Patient is a [id number] VASc 4. Anticipate switch from CCB to beta blocker. Discussed issues that would prompt earlier evaluation. Same cardiac medications pending above. Follow-up office visit in 4 weeks.

## 2021-08-16 ENCOUNTER — TELEPHONE (OUTPATIENT)
Dept: CARDIOLOGY CLINIC | Age: 76
End: 2021-08-16

## 2021-08-16 RX ORDER — METOPROLOL SUCCINATE 50 MG/1
50 TABLET, EXTENDED RELEASE ORAL DAILY
Qty: 30 TABLET | Refills: 5 | Status: SHIPPED
Start: 2021-08-16 | End: 2021-09-13 | Stop reason: SDUPTHER

## 2021-08-16 NOTE — TELEPHONE ENCOUNTER
Per  [employer], after review of brain MRI she will be started on Eliquis 5 mg bid, she will stop her Cardizem  and start Toprol XL 50 mg. She will be seen in follow-up in 4 weeks. this was discussed with the patient and she stated understanding.   appt scheduled for 9/13/21

## 2021-08-19 ENCOUNTER — TELEPHONE (OUTPATIENT)
Dept: FAMILY MEDICINE CLINIC | Age: 76
End: 2021-08-19

## 2021-08-19 ENCOUNTER — TELEPHONE (OUTPATIENT)
Dept: CARDIOLOGY CLINIC | Age: 76
End: 2021-08-19

## 2021-08-19 NOTE — TELEPHONE ENCOUNTER
Patients daughter called and stated the Eliquis was over 400.00 and to expensive for her. Please advise on something different to take.

## 2021-08-19 NOTE — TELEPHONE ENCOUNTER
Daughter is calling because Dr. Adeline Mejia [facility] is going   to put patient on warfarin and is wanting to know if Dr. Monique Goff could   monitor her anticoagulation or if she needs to go to the anticoagulation   clinic

## 2021-08-19 NOTE — TELEPHONE ENCOUNTER
----- Message from [facility] sent at 8/19/2021  4:12 PM EDT -----  Subject: Message to Provider    QUESTIONS  Information for Provider? Daughter is calling because Dr. Jesus Siddiqi is going   to put patient on warfarin and is wanting to know if Dr. Dory Wong could   monitor her anticoagulation or if she needs to go to the anticoagulation   clinic  ---------------------------------------------------------------------------  --------------  [address]  What is the best way for the office to contact you? OK to leave message on   voicemail  Preferred Call Back Phone Number? [phone]  ---------------------------------------------------------------------------  --------------  SCRIPT ANSWERS  Relationship to Patient? Other  Representative Name? Larissa   Is the Representative on the appropriate HIPAA document in Epic?  Yes

## 2021-08-20 NOTE — TELEPHONE ENCOUNTER
patients daughter called and stated they would like her to be started on Coumadin and Dr. Neli Caputo office will manage this.   Please advise on dosage

## 2021-08-20 NOTE — TELEPHONE ENCOUNTER
Patient notified that Dr Darryl Gilford will monitor her coumadin. Patient stated that she doesn't know when she will get started on it but when she does she will call us and make an appt for 1 week after to come in and see dr Diane Lucas.

## 2021-08-20 NOTE — TELEPHONE ENCOUNTER
Daughter called in and will be calling Dr. Cintia Bess office to have them call meds in.  Will be here for appointment on 8/20/21

## 2021-08-21 NOTE — TELEPHONE ENCOUNTER
I called in prescription to [facility] in [facility]. Warfarin 2.5 mg daily #30 RFx1.

## 2021-08-30 ENCOUNTER — OFFICE VISIT (OUTPATIENT)
Dept: FAMILY MEDICINE CLINIC | Age: 76
End: 2021-08-30
Payer: MEDICARE

## 2021-08-30 VITALS
HEART RATE: 56 BPM | TEMPERATURE: 96.8 F | OXYGEN SATURATION: 97 % | SYSTOLIC BLOOD PRESSURE: 128 MMHG | BODY MASS INDEX: 25.33 KG/M2 | DIASTOLIC BLOOD PRESSURE: 66 MMHG | WEIGHT: 152 LBS | HEIGHT: 65 IN

## 2021-08-30 DIAGNOSIS — E78.5 HYPERLIPIDEMIA, UNSPECIFIED HYPERLIPIDEMIA TYPE: ICD-10-CM

## 2021-08-30 DIAGNOSIS — I10 ESSENTIAL HYPERTENSION: ICD-10-CM

## 2021-08-30 DIAGNOSIS — Z79.01 ANTICOAGULATED ON COUMADIN: ICD-10-CM

## 2021-08-30 DIAGNOSIS — Z12.31 ENCOUNTER FOR SCREENING MAMMOGRAM FOR MALIGNANT NEOPLASM OF BREAST: Primary | ICD-10-CM

## 2021-08-30 DIAGNOSIS — F32.5 MAJOR DEPRESSIVE DISORDER WITH SINGLE EPISODE, IN REMISSION (HCC): ICD-10-CM

## 2021-08-30 DIAGNOSIS — I48.0 PAF (PAROXYSMAL ATRIAL FIBRILLATION) (HCC): ICD-10-CM

## 2021-08-30 DIAGNOSIS — R42 LIGHTHEADEDNESS: ICD-10-CM

## 2021-08-30 LAB
INTERNATIONAL NORMALIZATION RATIO, POC: 1.1
PROTHROMBIN TIME, POC: 12.9

## 2021-08-30 PROCEDURE — 1123F ACP DISCUSS/DSCN MKR DOCD: CPT | Performed by: INTERNAL MEDICINE

## 2021-08-30 PROCEDURE — 99214 OFFICE O/P EST MOD 30 MIN: CPT | Performed by: INTERNAL MEDICINE

## 2021-08-30 PROCEDURE — G8400 PT W/DXA NO RESULTS DOC: HCPCS | Performed by: INTERNAL MEDICINE

## 2021-08-30 PROCEDURE — G8427 DOCREV CUR MEDS BY ELIG CLIN: HCPCS | Performed by: INTERNAL MEDICINE

## 2021-08-30 PROCEDURE — 1090F PRES/ABSN URINE INCON ASSESS: CPT | Performed by: INTERNAL MEDICINE

## 2021-08-30 PROCEDURE — 4040F PNEUMOC VAC/ADMIN/RCVD: CPT | Performed by: INTERNAL MEDICINE

## 2021-08-30 PROCEDURE — G8417 CALC BMI ABV UP PARAM F/U: HCPCS | Performed by: INTERNAL MEDICINE

## 2021-08-30 PROCEDURE — 1036F TOBACCO NON-USER: CPT | Performed by: INTERNAL MEDICINE

## 2021-08-30 PROCEDURE — 85610 PROTHROMBIN TIME: CPT | Performed by: INTERNAL MEDICINE

## 2021-08-30 RX ORDER — WARFARIN SODIUM 1 MG/1
TABLET ORAL
Qty: 60 TABLET | Refills: 3 | Status: SHIPPED
Start: 2021-08-30 | End: 2022-01-31 | Stop reason: SDUPTHER

## 2021-08-30 RX ORDER — LISINOPRIL 10 MG/1
10 TABLET ORAL DAILY
Qty: 30 TABLET | Refills: 5 | Status: SHIPPED
Start: 2021-08-30 | End: 2021-09-20 | Stop reason: SDUPTHER

## 2021-08-30 RX ORDER — NITROGLYCERIN 0.4 MG/1
0.4 TABLET SUBLINGUAL EVERY 5 MIN PRN
Qty: 25 TABLET | Refills: 0 | Status: SHIPPED | OUTPATIENT
Start: 2021-08-30

## 2021-08-30 RX ORDER — WARFARIN SODIUM 2.5 MG/1
TABLET ORAL
Qty: 90 TABLET | Refills: 1 | Status: CANCELLED | OUTPATIENT
Start: 2021-08-30

## 2021-08-30 RX ORDER — WARFARIN SODIUM 2.5 MG/1
TABLET ORAL
COMMUNITY
Start: 2021-08-21 | End: 2021-08-30

## 2021-08-30 NOTE — PROGRESS NOTES
[facility]     21  Rod Calender : 1945 Sex: female  Age: 67 y.o. Chief Complaint   Patient presents with    2 Week Follow-Up     dizziness; has gotten better since she was seem by us last; still is getting some dizzy spells just not as often       HPI    Patient presents today for follow-up visit on her medical problems. I reviewed my last note. Patient was found on Holter to have periods of A. fib. She was seen by cardiology and was started on warfarin which I will not be managing and stopped her diltiazem and start metoprolol. She comes in today with her daughter still complaining of some dizziness but not as often not as severe. They do show me blood pressure numbers which especially in the last several days have been on the low side. I told her I am going to decrease her lisinopril from 20 mg to 10 mg to see if we can resolve some of her symptoms. Her INR was done today as well. It was at 1.1. She has been on Coumadin for the last week plus an INR is only 1.1. I told her we are going to go from 2.5 mg daily to 4 mg daily but we are only going to use 1 mg pill she could more easily make adjustments if needed until she gets regulated. She has had colonoscopy and EGD since I have seen her last.  I did review those records as well. Lipids have been stable on statin medication. Depression has been okay on her SSRI. GERD has been stable on PPI. Continues to live with her daughter. Arrangements have been good. She denies any falls. She follows with GI, orthopedics and now cardiology      Review of Systems     Constitutional: Negative for activity change, appetite change, chills, diaphoresis, , fever and unexpected weight change.    HENT: Does admit to sinus congestion drainage fullness.   Respiratory: Negative for cough,  and wheezing.   Positive for dyspnea on exertion  Cardiovascular: Negative for palpitations and leg swelling.  Did have chest tightness-seeing cardiology and recent negative stress test-resolved  gastrointestinal: Negative for abdominal pain, blood in stool, constipation, diarrhea, nausea and vomiting.  Does have history of ulcerative colitis. -Quiescent at this point  Endocrine: Negative.    Genitourinary:  Negative for difficulty urinating, dysuria, frequency and hematuria.    No longer seeing urology  Musculoskeletal: Positive for arthralgias. negative for back pain,  and myalgias.       Skin: Negative.    Allergic/Immunologic: Negative for environmental allergies and immunocompromised state. Neurological: Negative for, weakness, light-headedness, numbness and headaches. Hematological: Negative.  Positive for dizziness/lightheadedness-some improvement  Psychiatric/Behavioral:        Depression has improved.  She is on medication.  Living with her daughter that is working out well  Things have been a little bit more trying with recent COVID-19 pandemic status and her spending more time at home. -Improving         REST OF PERTINENT ROS GONE OVER AND WAS NEGATIVE.      PMH:  Health Maintenance:  Mammogram - (7/9/2018)  Mammogram Screening - (7/9/2018)  Influenza Vaccination - (9/20/2018)  Couseled on Home Safety - (2/26/2018)  Colonoscopy - (1/16/2018), 8/21-due 26  Colonoscopy Screening - (1/16/2018)  Bone Density Scan - (9/29/2016)  Pneumonia Vaccination - (9/22/2016)  Tetanus Immunization - (9/22/2016)  pt cannot recall if she had this vaccination, no record from pharmacy  Stress Test - 11/11,9/15, 7/21-negative  heart cath - 2/12-ok  Colonoscopy - 2/14,8/14,1/18-due 21  EKG - 3/14, 11/17  has Gyn - Dr. Milagros Blevins  2D ECHO - 9/15  Hemmocult Cards - 10/16-neg x 3  EGD - 1/18, 10/19,-due 22, 8/21-due 24  Prevnar Vaccine - (11/3/2015) [facility]  Medical Problems:  Hypertension, Restless Leg Syndrome, irritable bladder, Depression  Ulcerative Colitis - follows with dr Tesha Mattson  Anemia - follows with dr Jessica Goncalves Polyps, over active bladder, vit D defic, chronic sinus congestion, Osteoarthritis, Diverticulosis, fx  right wrist, Pfo, Hyperlipidemia, Diastolic Dysfunction, Cholelithiasis, fracture right lateral malleolus,  Valvular Heart Disease, Lumbar DDD, Lumbar Spinal Stenosis, Lumbar herniated disc  Surgical Hx:  DEON - Non-cancerous  Left breast mass removal - x2-benign  Right carpal tunnel surgery, Rectocele repair, Left knee arthroscopy, Bilateral total hip arthroplasty,  Bladder suspension, Bunion surgery, Bilateral cataract surgery, Repair of torn meniscus left knee,  Posterior-lateral fusion L2-5    Right total knee arthroplasty-  Left total knee arthroplasty-  PAF--anticoagulated with warfarin   old records reviewed  FH:  Father:  Heart Disease -  age 80. Mother:  Non Insulin Dependent Diabetes -  age84  Congestive Heart Failure (CHF). Brother 1:  Obesity, Peripheral Vascular Disease (PVD). Maternal Grandmother:  Breast Cancer. SH:  Marital: Legal Status: . retired   Personal Habits: Cigarette Use: Does Not Smoke. Alcohol: Denies alcohol use                   Current Outpatient Medications:     nitroGLYCERIN (NITROSTAT) 0.4 MG SL tablet, Place 1 tablet under the tongue every 5 minutes as needed for Chest pain up to max of 3 total doses.  If no relief after 1 dose, call 911., Disp: 25 tablet, Rfl: 0    warfarin (COUMADIN) 1 MG tablet, 4 pills po qpm, Disp: 60 tablet, Rfl: 3    lisinopril (PRINIVIL;ZESTRIL) 10 MG tablet, Take 1 tablet by mouth daily, Disp: 30 tablet, Rfl: 5    metoprolol succinate (TOPROL XL) 50 MG extended release tablet, Take 1 tablet by mouth daily, Disp: 30 tablet, Rfl: 5    Cyanocobalamin (B-12) 1000 MCG TABS, Take by mouth daily, Disp: , Rfl:     Fexofenadine-Pseudoephedrine (ALLEGRA-D 24 HOUR PO), Take by mouth, Disp: , Rfl:     MELATONIN ER PO, Take by mouth nightly, Disp: , Rfl:     Propylene Glycol-Glycerin (SOOTHE OP), Apply to eye, Disp: , Rfl:     potassium chloride (KLOR-CON M) 20 MEQ extended release tablet, Take 1 tablet by mouth daily, Disp: 90 tablet, Rfl: 1    hydroCHLOROthiazide (MICROZIDE) 12.5 MG capsule, Take 1 capsule by mouth daily, Disp: 90 capsule, Rfl: 1    escitalopram (LEXAPRO) 10 MG tablet, Take 1 tablet by mouth daily, Disp: 90 tablet, Rfl: 1    pravastatin (PRAVACHOL) 20 MG tablet, Take 1 tablet by mouth daily, Disp: 90 tablet, Rfl: 1    vitamin D 25 MCG (1000 UT) CAPS, Take by mouth daily , Disp: , Rfl:     omeprazole (PRILOSEC) 20 MG delayed release capsule, Take 20 mg by mouth daily , Disp: , Rfl:     Biotin 13002 MCG TABS, Take by mouth daily , Disp: , Rfl:   Allergies   Allergen Reactions    Erythromycin Hives    Penicillin G     Azithromycin     Penicillins Hives    Propoxyphene        Past Medical History:   Diagnosis Date    Anemia     Cholelithiasis     Chronic congestion of paranasal sinus     Colon polyps     Depression     Diastolic dysfunction     Diverticulosis     HTN (hypertension)     Hyperlipidemia 06/20/2019    Irritable bladder     Lumbar degenerative disc disease     Lumbar herniated disc     Lumbar spinal stenosis     Major depressive disorder with single episode, in remission ([facility] [address] 06/20/2019    Osteoarthritis     Overactive bladder     RLS (restless legs syndrome) 06/20/2019    Ulcerative colitis ([facility] [address]     Ulcerative colitis without complications ([facility] [address] 82/67/7223    Valvular heart disease     Vitamin D deficiency     Wrist fracture     Right     Past Surgical History:   Procedure Laterality Date    BLADDER SUSPENSION  1978    BREAST LUMPECTOMY Left     x 2, negative    BUNIONECTOMY  1990    CARDIAC CATHETERIZATION  2/2012    CARPAL TUNNEL RELEASE Right     CATARACT REMOVAL WITH IMPLANT Right     COLONOSCOPY  2/2014    HYSTERECTOMY, TOTAL ABDOMINAL      non-cancerous    JOINT REPLACEMENT Bilateral     hip    KNEE ARTHROSCOPY Left     RECTOCELE REPAIR       Family History   Problem Relation Age of Onset    Heart Disease Father          age 80    Diabetes Mother          age 80    Heart Disease Mother         CHF    Other Brother         PVD, obesity    Cancer Paternal Grandmother         breast     Social History     Socioeconomic History    Marital status:      Spouse name: Not on file    Number of children: Not on file    Years of education: Not on file    Highest education level: Not on file   Occupational History    Not on file   Tobacco Use    Smoking status: Never Smoker    Smokeless tobacco: Never Used   Vaping Use    Vaping Use: Never used   Substance and Sexual Activity    Alcohol use: Yes     Comment: occasionally    Drug use: No    Sexual activity: Not on file   Other Topics Concern    Not on file   Social History Narrative    Not on file     Social Determinants of Health     Financial Resource Strain: Low Risk     Difficulty of Paying Living Expenses: Not hard at all   Food Insecurity: No Food Insecurity    Worried About [address] [employer] in the Last Year: Never true    [address] [employer] in the Last Year: Never true   Transportation Needs:     Lack of Transportation (Medical):      Lack of Transportation (Non-Medical):    Physical Activity:     Days of Exercise per Week:     Minutes of Exercise per Session:    Stress:     Feeling of Stress :    Social Connections:     Frequency of Communication with Friends and Family:     Frequency of Social Gatherings with Friends and Family:     Attends [employer] Services:     Active Member of Clubs or Organizations:     Attends Club or Organization Meetings:     Marital Status:    Intimate Partner Violence:     Fear of Current or Ex-Partner:     Emotionally Abused:     Physically Abused:     Sexually Abused:        Vitals:    21 1039   BP: 128/66   Pulse: 56   Temp: 96.8 °F (36 °C)   TempSrc: Temporal   SpO2: 97%   Weight: 152 lb (68.9 kg)   Height: 5' 5\" (1.651 m)       Physical Exam    Constitutional: She is oriented (COUMADIN) 1 MG tablet; 4 pills po qpm  -     lisinopril (PRINIVIL;ZESTRIL) 10 MG tablet; Take 1 tablet by mouth daily      Plan: See above body report. I did decrease her lisinopril from 20 mg to 10 mg daily. Monitor blood pressure closely. I increased her Coumadin from 2.5 mg to 4 mg daily. We will recheck INR in follow-up in 1 week. We will check fasting blood work on her at that point as well. Follow-up with above consultants. Fall precautions. Prescription management performed. Notify us of problems in the interim. Return in about 1 week (around 9/6/2021). Seen By:  Betty Garcia MD      *Document was created using voice recognition software. Note was reviewed however may contain grammatical errors.

## 2021-09-09 ENCOUNTER — OFFICE VISIT (OUTPATIENT)
Dept: FAMILY MEDICINE CLINIC | Age: 76
End: 2021-09-09
Payer: MEDICARE

## 2021-09-09 ENCOUNTER — TELEPHONE (OUTPATIENT)
Dept: FAMILY MEDICINE CLINIC | Age: 76
End: 2021-09-09

## 2021-09-09 VITALS
TEMPERATURE: 98.1 F | BODY MASS INDEX: 25.33 KG/M2 | HEART RATE: 61 BPM | HEIGHT: 65 IN | SYSTOLIC BLOOD PRESSURE: 144 MMHG | WEIGHT: 152 LBS | DIASTOLIC BLOOD PRESSURE: 70 MMHG | OXYGEN SATURATION: 96 %

## 2021-09-09 DIAGNOSIS — Z79.01 ANTICOAGULATED ON COUMADIN: Primary | ICD-10-CM

## 2021-09-09 DIAGNOSIS — I48.0 PAF (PAROXYSMAL ATRIAL FIBRILLATION) (HCC): ICD-10-CM

## 2021-09-09 DIAGNOSIS — R42 LIGHTHEADEDNESS: ICD-10-CM

## 2021-09-09 DIAGNOSIS — F32.5 MAJOR DEPRESSIVE DISORDER WITH SINGLE EPISODE, IN REMISSION (HCC): ICD-10-CM

## 2021-09-09 DIAGNOSIS — I10 ESSENTIAL HYPERTENSION: ICD-10-CM

## 2021-09-09 DIAGNOSIS — E78.5 HYPERLIPIDEMIA, UNSPECIFIED HYPERLIPIDEMIA TYPE: ICD-10-CM

## 2021-09-09 LAB
INTERNATIONAL NORMALIZATION RATIO, POC: 1.2
PROTHROMBIN TIME, POC: 14.9

## 2021-09-09 PROCEDURE — 4040F PNEUMOC VAC/ADMIN/RCVD: CPT | Performed by: INTERNAL MEDICINE

## 2021-09-09 PROCEDURE — 1036F TOBACCO NON-USER: CPT | Performed by: INTERNAL MEDICINE

## 2021-09-09 PROCEDURE — 85610 PROTHROMBIN TIME: CPT | Performed by: INTERNAL MEDICINE

## 2021-09-09 PROCEDURE — 1090F PRES/ABSN URINE INCON ASSESS: CPT | Performed by: INTERNAL MEDICINE

## 2021-09-09 PROCEDURE — 99214 OFFICE O/P EST MOD 30 MIN: CPT | Performed by: INTERNAL MEDICINE

## 2021-09-09 PROCEDURE — G8417 CALC BMI ABV UP PARAM F/U: HCPCS | Performed by: INTERNAL MEDICINE

## 2021-09-09 PROCEDURE — G8427 DOCREV CUR MEDS BY ELIG CLIN: HCPCS | Performed by: INTERNAL MEDICINE

## 2021-09-09 PROCEDURE — G8400 PT W/DXA NO RESULTS DOC: HCPCS | Performed by: INTERNAL MEDICINE

## 2021-09-09 PROCEDURE — 1123F ACP DISCUSS/DSCN MKR DOCD: CPT | Performed by: INTERNAL MEDICINE

## 2021-09-09 RX ORDER — WARFARIN SODIUM 5 MG/1
5 TABLET ORAL DAILY
COMMUNITY
End: 2021-09-09 | Stop reason: SDUPTHER

## 2021-09-11 RX ORDER — WARFARIN SODIUM 5 MG/1
5 TABLET ORAL DAILY
Qty: 90 TABLET | Refills: 1 | Status: SHIPPED
Start: 2021-09-11 | End: 2022-02-23 | Stop reason: SDUPTHER

## 2021-09-11 NOTE — PROGRESS NOTES
[address] [place]     21  [place] Borrego : 1945 Sex: female  Age: 67 y.o. Chief Complaint   Patient presents with   [facility] Visit for Anticoagulation Management     1 wk INR    Other     1 week follow-up       HPI    Patient presents today accompanied by her daughter for 1 week follow-up visit on her medical problems as well as INR check. INR today is still very low at 1.2. She is currently on 4 mg of Coumadin as of last visit last week. I told her going to have to bump this up to get her therapeutic. I increased her to 6 mg daily. I told her to take 8 mg today then bump up to 6. No bleeding or thrombotic episodes. Last visit we also decreased her lisinopril from 20 mg to 10 mg. This was because of continued dizziness lightheadedness. This has helped. She still has a few periods of this lightheaded feeling but is much less. States she did have one episode in the interim where she almost passed out but family members caught her. Apparently did not check blood pressure at that time. No falls. Her blood pressure has been okay with reduction of this lisinopril thus far. Her depression has been stable on her SSRI and tolerating this well. Lipids have been stable on statin medication. GERD continues to be stable on her PPI. Continues to live with daughter. She follows with GI, orthopedics and now cardiology     Review of Systems   Constitutional: Negative for activity change, appetite change, chills, diaphoresis, , fever and unexpected weight change.    HENT: Negative  Respiratory: Negative for cough,  and wheezing.   Positive for dyspnea on exertion  Cardiovascular: Negative for palpitations and leg swelling.  Did have chest tightness-seeing cardiology and recent negative stress test-resolved  gastrointestinal: Negative for abdominal pain, blood in stool, constipation, diarrhea, nausea and vomiting.  Does have history of ulcerative colitis. -Quiescent at this point  Endocrine: Negative.    Genitourinary:  Negative for difficulty urinating, dysuria, frequency and hematuria.    No longer seeing urology  Musculoskeletal: Positive for arthralgias. negative for back pain,  and myalgias.       Skin: Negative.    Allergic/Immunologic: Negative for environmental allergies and immunocompromised state. Neurological: Negative for, weakness, light-headedness, numbness and headaches. Hematological: Negative.  Positive for dizziness/lightheadedness-improving   psychiatric/Behavioral:        Depression has improved.  She is on medication.  Living with her daughter that is working out well  Things have been a little bit more trying with recent COVID-19 pandemic status and her spending more time at home. -Improving           REST OF PERTINENT ROS GONE OVER AND WAS NEGATIVE.    PMH:  Health Maintenance:  Mammogram - (7/9/2018)  Mammogram Screening - (7/9/2018)  Influenza Vaccination - (9/20/2018)  Couseled on Home Safety - (2/26/2018)  Colonoscopy - (1/16/2018), 8/21-due 26  Colonoscopy Screening - (1/16/2018)  Bone Density Scan - (9/29/2016)  Pneumonia Vaccination - (9/22/2016)  Tetanus Immunization - (9/22/2016)  pt cannot recall if she had this vaccination, no record from pharmacy  Stress Test - 11/11,9/15, 7/21-negative  heart cath - 2/12-ok  Colonoscopy - 2/14,8/14,1/18-due 21  EKG - 3/14, 11/17  has Gyn - Dr. Zamzam Garcia  2D ECHO - 9/15  Hemmocult Cards - 10/16-neg x 3  EGD - 1/18, 10/19,-due 22, 8/21-due 24  Prevnar Vaccine - (11/3/2015) [facility]  Medical Problems:  Hypertension, Restless Leg Syndrome, irritable bladder, Depression  Ulcerative Colitis - follows with dr Vishal Tripathi  Anemia - follows with dr Wilmar Wild Polyps, over active bladder, vit D defic, chronic sinus congestion, Osteoarthritis, Diverticulosis, fx  right wrist, Pfo, Hyperlipidemia, Diastolic Dysfunction, Cholelithiasis, fracture right lateral malleolus,  Valvular Heart Disease, Lumbar DDD, Lumbar Spinal Stenosis, Lumbar herniated disc  Paroxysmal atrial fibrillation--anticoagulated with warfarin    Surgical Hx:  DEON - Non-cancerous  Left breast mass removal - x2-benign  Right carpal tunnel surgery, Rectocele repair, Left knee arthroscopy, Bilateral total hip arthroplasty,  Bladder suspension, Bunion surgery, Bilateral cataract surgery, Repair of torn meniscus left knee,  Posterior-lateral fusion L2-5    Right total knee arthroplasty-  Left total knee arthroplasty-   old records reviewed  FH:  Father:  Heart Disease -  age 80. Mother:  Non Insulin Dependent Diabetes -  age84  Congestive Heart Failure (CHF). Brother 1:  Obesity, Peripheral Vascular Disease (PVD). Maternal Grandmother:  Breast Cancer. SH:  Marital: Legal Status: . retired   Personal Habits: Cigarette Use: Does Not Smoke. Alcohol: Denies alcohol use                  Current Outpatient Medications:     warfarin (COUMADIN) 5 MG tablet, Take 1 tablet by mouth daily, Disp: 90 tablet, Rfl: 1    nitroGLYCERIN (NITROSTAT) 0.4 MG SL tablet, Place 1 tablet under the tongue every 5 minutes as needed for Chest pain up to max of 3 total doses.  If no relief after 1 dose, call 911., Disp: 25 tablet, Rfl: 0    warfarin (COUMADIN) 1 MG tablet, 4 pills po qpm, Disp: 60 tablet, Rfl: 3    lisinopril (PRINIVIL;ZESTRIL) 10 MG tablet, Take 1 tablet by mouth daily, Disp: 30 tablet, Rfl: 5    metoprolol succinate (TOPROL XL) 50 MG extended release tablet, Take 1 tablet by mouth daily, Disp: 30 tablet, Rfl: 5    Cyanocobalamin (B-12) 1000 MCG TABS, Take by mouth daily, Disp: , Rfl:     Fexofenadine-Pseudoephedrine (ALLEGRA-D 24 HOUR PO), Take by mouth, Disp: , Rfl:     MELATONIN ER PO, Take by mouth nightly, Disp: , Rfl:     Propylene Glycol-Glycerin (SOOTHE OP), Apply to eye, Disp: , Rfl:     potassium chloride (KLOR-CON M) 20 MEQ extended release tablet, Take 1 tablet by mouth daily, Disp: 90 tablet, Rfl: 1    hydroCHLOROthiazide (MICROZIDE) 12.5 MG capsule, Take 1 capsule by mouth daily, Disp: 90 capsule, Rfl: 1    escitalopram (LEXAPRO) 10 MG tablet, Take 1 tablet by mouth daily, Disp: 90 tablet, Rfl: 1    pravastatin (PRAVACHOL) 20 MG tablet, Take 1 tablet by mouth daily, Disp: 90 tablet, Rfl: 1    vitamin D 25 MCG (1000 UT) CAPS, Take by mouth daily , Disp: , Rfl:     omeprazole (PRILOSEC) 20 MG delayed release capsule, Take 20 mg by mouth daily , Disp: , Rfl:     Biotin 08567 MCG TABS, Take by mouth daily , Disp: , Rfl:   Allergies   Allergen Reactions    Erythromycin Hives    Penicillin G     Azithromycin     Penicillins Hives    Propoxyphene        Past Medical History:   Diagnosis Date    Anemia     Cholelithiasis     Chronic congestion of paranasal sinus     Colon polyps     Depression     Diastolic dysfunction     Diverticulosis     HTN (hypertension)     Hyperlipidemia 2019    Irritable bladder     Lumbar degenerative disc disease     Lumbar herniated disc     Lumbar spinal stenosis     Major depressive disorder with single episode, in remission ([facility] [address] 2019    Osteoarthritis     Overactive bladder     RLS (restless legs syndrome) 2019    Ulcerative colitis ([facility] [address]     Ulcerative colitis without complications ([facility] [address]     Valvular heart disease     Vitamin D deficiency     Wrist fracture     Right     Past Surgical History:   Procedure Laterality Date    BLADDER SUSPENSION      BREAST LUMPECTOMY Left     x 2, negative    BUNIONECTOMY      CARDIAC CATHETERIZATION  2012    CARPAL TUNNEL RELEASE Right     CATARACT REMOVAL WITH IMPLANT Right     COLONOSCOPY  2014    HYSTERECTOMY, TOTAL ABDOMINAL      non-cancerous    JOINT REPLACEMENT Bilateral     hip    KNEE ARTHROSCOPY Left     RECTOCELE REPAIR       Family History   Problem Relation Age of Onset    Heart Disease Father          age 80    Diabetes Mother          age 80    Heart Disease Mother CHF    Other Brother         PVD, obesity    Cancer Paternal Grandmother         breast     Social History     Socioeconomic History    Marital status:      Spouse name: Not on file    Number of children: Not on file    Years of education: Not on file    Highest education level: Not on file   Occupational History    Not on file   Tobacco Use    Smoking status: Never Smoker    Smokeless tobacco: Never Used   Vaping Use    Vaping Use: Never used   Substance and Sexual Activity    Alcohol use: Yes     Comment: occasionally    Drug use: No    Sexual activity: Not on file   Other Topics Concern    Not on file   Social History Narrative    Not on file     Social Determinants of Health     Financial Resource Strain: Low Risk     Difficulty of Paying Living Expenses: Not hard at all   Food Insecurity: No Food Insecurity    Worried About [address] [employer] in the Last Year: Never true    [address] [employer] in the Last Year: Never true   Transportation Needs:     Lack of Transportation (Medical):  Lack of Transportation (Non-Medical):    Physical Activity:     Days of Exercise per Week:     Minutes of Exercise per Session:    Stress:     Feeling of Stress :    Social Connections:     Frequency of Communication with Friends and Family:     Frequency of Social Gatherings with Friends and Family:     Attends Judaism Services:     Active Member of Clubs or Organizations:     Attends Club or Organization Meetings:     Marital Status:    Intimate Partner Violence:     Fear of Current or Ex-Partner:     Emotionally Abused:     Physically Abused:     Sexually Abused:        Vitals:    09/09/21 0654   BP: (!) 144/70   Pulse: 61   Temp: 98.1 °F (36.7 °C)   TempSrc: Temporal   SpO2: 96%   Weight: 152 lb (68.9 kg)   Height: 5' 5\" (1.651 m)       Physical Exam  Constitutional: She is oriented to person, place, and time.  She appears well-developed and well-nourished. No distress.   Neck: Normal range of motion. Neck supple. No JVD present. No thyromegaly present. Cardiovascular: Normal rate, regular rhythm, normal heart sounds and intact distal pulses. Exam reveals no gallop and no friction rub.   No murmur heard. Pulmonary/Chest: Effort normal and breath sounds normal. No respiratory distress. She has no wheezes. She has no rales. Abdominal: Soft. Bowel sounds are normal. She exhibits no distension and no mass. There is no tenderness. Musculoskeletal: Normal range of motion. She exhibits no edema.      Neurological: She is alert and oriented to person, place, and time. She displays normal reflexes. No sensory deficit. She exhibits normal muscle tone. Coordination normal.   Skin: Skin is warm and dry. No rash noted. No erythema. Psychiatric: She has a normal mood and affect. Her behavior is normal.   Nursing note and vitals reviewed.           Assessment and Plan:  Yolanda Orellana was seen today for office visit for anticoagulation management and other. Diagnoses and all orders for this visit:    Anticoagulated on Coumadin  -     POCT INR    PAF (paroxysmal atrial fibrillation) (HCC)  Converted back to sinus rhythm    Major depressive disorder with single episode, in remission ([facility] [address]  Stable on SSRI    Lightheadedness  Improving with control of atrial fibrillation and decreasing antihypertensive medicine    Essential hypertension  -     CBC Auto Differential; Future  -     Comprehensive Metabolic Panel; Future  Stable on current antihypertensive regimen    Hyperlipidemia, unspecified hyperlipidemia type  -     Lipid Panel; Future  Stable on statin     Other orders  -     warfarin (COUMADIN) 5 MG tablet; Take 1 tablet by mouth daily    Plan: I will see her back in 1 week for INR check. Continue to monitor blood pressure closely. Fall precautions. Follow with above consultants. Sees cardiology next week. Blood work today to monitor disease progression and medication use. Prescription management performed. Coumadin dose adjusted today. Notify us of problems in the interim. Return in about 1 week (around 9/16/2021) for 1 week inr. Seen By:  Michele Aldana MD      *Document was created using voice recognition software. Note was reviewed however may contain grammatical errors.

## 2021-09-13 ENCOUNTER — OFFICE VISIT (OUTPATIENT)
Dept: CARDIOLOGY CLINIC | Age: 76
End: 2021-09-13
Payer: MEDICARE

## 2021-09-13 VITALS
SYSTOLIC BLOOD PRESSURE: 110 MMHG | WEIGHT: 155.4 LBS | BODY MASS INDEX: 25.89 KG/M2 | HEIGHT: 65 IN | DIASTOLIC BLOOD PRESSURE: 70 MMHG | RESPIRATION RATE: 16 BRPM | HEART RATE: 63 BPM

## 2021-09-13 DIAGNOSIS — I48.0 PAF (PAROXYSMAL ATRIAL FIBRILLATION) (HCC): Primary | ICD-10-CM

## 2021-09-13 DIAGNOSIS — I10 ESSENTIAL HYPERTENSION: ICD-10-CM

## 2021-09-13 DIAGNOSIS — R42 LIGHTHEADEDNESS: ICD-10-CM

## 2021-09-13 DIAGNOSIS — E78.00 PURE HYPERCHOLESTEROLEMIA: ICD-10-CM

## 2021-09-13 PROCEDURE — G8400 PT W/DXA NO RESULTS DOC: HCPCS | Performed by: INTERNAL MEDICINE

## 2021-09-13 PROCEDURE — 93000 ELECTROCARDIOGRAM COMPLETE: CPT | Performed by: INTERNAL MEDICINE

## 2021-09-13 PROCEDURE — G8417 CALC BMI ABV UP PARAM F/U: HCPCS | Performed by: INTERNAL MEDICINE

## 2021-09-13 PROCEDURE — 1090F PRES/ABSN URINE INCON ASSESS: CPT | Performed by: INTERNAL MEDICINE

## 2021-09-13 PROCEDURE — 1036F TOBACCO NON-USER: CPT | Performed by: INTERNAL MEDICINE

## 2021-09-13 PROCEDURE — G8427 DOCREV CUR MEDS BY ELIG CLIN: HCPCS | Performed by: INTERNAL MEDICINE

## 2021-09-13 PROCEDURE — 1123F ACP DISCUSS/DSCN MKR DOCD: CPT | Performed by: INTERNAL MEDICINE

## 2021-09-13 PROCEDURE — 99213 OFFICE O/P EST LOW 20 MIN: CPT | Performed by: INTERNAL MEDICINE

## 2021-09-13 PROCEDURE — 4040F PNEUMOC VAC/ADMIN/RCVD: CPT | Performed by: INTERNAL MEDICINE

## 2021-09-13 RX ORDER — METOPROLOL SUCCINATE 50 MG/1
50 TABLET, EXTENDED RELEASE ORAL DAILY
Qty: 90 TABLET | Refills: 3 | Status: SHIPPED
Start: 2021-09-13 | End: 2022-03-15 | Stop reason: SDUPTHER

## 2021-09-13 NOTE — PROGRESS NOTES
Patient Active Problem List   Diagnosis    HTN (hypertension)    Chest pain    Ulcerative colitis without complications ([facility]    Hyperlipidemia    Major depressive disorder with single episode, in remission ([facility]    RLS (restless legs syndrome)    Anemia    VHD (valvular heart disease)    Lightheadedness       Current Outpatient Medications   Medication Sig Dispense Refill    metoprolol succinate (TOPROL XL) 50 MG extended release tablet Take 1 tablet by mouth daily 90 tablet 3    warfarin (COUMADIN) 5 MG tablet Take 1 tablet by mouth daily 90 tablet 1    nitroGLYCERIN (NITROSTAT) 0.4 MG SL tablet Place 1 tablet under the tongue every 5 minutes as needed for Chest pain up to max of 3 total doses. If no relief after 1 dose, call 911. 25 tablet 0    warfarin (COUMADIN) 1 MG tablet 4 pills po qpm (Patient taking differently: 1 mg daily ) 60 tablet 3    lisinopril (PRINIVIL;ZESTRIL) 10 MG tablet Take 1 tablet by mouth daily 30 tablet 5    Cyanocobalamin (B-12) 1000 MCG TABS Take by mouth daily      Fexofenadine-Pseudoephedrine (ALLEGRA-D 24 HOUR PO) Take by mouth      MELATONIN ER PO Take by mouth nightly      Propylene Glycol-Glycerin (SOOTHE OP) Apply to eye      potassium chloride (KLOR-CON M) 20 MEQ extended release tablet Take 1 tablet by mouth daily 90 tablet 1    hydroCHLOROthiazide (MICROZIDE) 12.5 MG capsule Take 1 capsule by mouth daily 90 capsule 1    escitalopram (LEXAPRO) 10 MG tablet Take 1 tablet by mouth daily 90 tablet 1    pravastatin (PRAVACHOL) 20 MG tablet Take 1 tablet by mouth daily 90 tablet 1    vitamin D 25 MCG (1000 UT) CAPS Take by mouth daily       omeprazole (PRILOSEC) 20 MG delayed release capsule Take 20 mg by mouth daily       Biotin 46151 MCG TABS Take by mouth daily        No current facility-administered medications for this visit. CC:    Patient is seen in follow up for:  1. PAF (paroxysmal atrial fibrillation) ([facility]    2. Essential hypertension    3. Pure hypercholesterolemia    4. Lightheadedness        HPI:  Tolerating beta-blocker therapy and anticoagulation well so far. ACE in addition decreased due to low BPs. One episode of lightheadedness since last seen. None since ACE decreased. ROS:   General: No unusual weight gain, no change in exercise tolerance  Skin: No rash or itching  EENT: No vision changes or nosebleeds  Cardiovascular: No orthopnea or paroxysmal nocturnal dyspnea  Respiratory: No cough or hemoptysis  Gastrointestinal: No hematemesis or recent changes in bowel habits  Genitourinary: No hematuria, urgency or frequency  Musculoskeletal: No muscular weakness or joint swelling   Neurologic / Psychiatric: No incoordination or convulsions  Allergic / Immunologic/ Lymphatic / Endocrine: No anemia or bleeding tendency    Social History     Socioeconomic History    Marital status:      Spouse name: Not on file    Number of children: Not on file    Years of education: Not on file    Highest education level: Not on file   Occupational History    Not on file   Tobacco Use    Smoking status: Never Smoker    Smokeless tobacco: Never Used   Vaping Use    Vaping Use: Never used   Substance and Sexual Activity    Alcohol use: Yes     Comment: occasionally    Drug use: No    Sexual activity: Not on file   Other Topics Concern    Not on file   Social History Narrative    Not on file     Social Determinants of Health     Financial Resource Strain: Low Risk     Difficulty of Paying Living Expenses: Not hard at all   Food Insecurity: No Food Insecurity    Worried About Running Out of Food in the Last Year: Never true    [place] of Food in the Last Year: Never true   Transportation Needs:     Lack of Transportation (Medical):      Lack of Transportation (Non-Medical):    Physical Activity:     Days of Exercise per Week:     Minutes of Exercise per Session:    Stress:     Feeling of Stress :    Social Connections:     Frequency of Communication with Friends and Family:     Frequency of Social Gatherings with Friends and Family:     Attends Zoroastrianism Services:     Active Member of Clubs or Organizations:     Attends Club or Organization Meetings:     Marital Status:    Intimate Partner Violence:     Fear of Current or Ex-Partner:     Emotionally Abused:     Physically Abused:     Sexually Abused:        Family History   Problem Relation Age of Onset    Heart Disease Father          age 80    Diabetes Mother          age 80    Heart Disease Mother         CHF    Other Brother         PVD, obesity    Cancer Paternal Grandmother         breast       Past Medical History:   Diagnosis Date    Anemia     Cholelithiasis     Chronic congestion of paranasal sinus     Colon polyps     Depression     Diastolic dysfunction     Diverticulosis     HTN (hypertension)     Hyperlipidemia 2019    Irritable bladder     Lumbar degenerative disc disease     Lumbar herniated disc     Lumbar spinal stenosis     Major depressive disorder with single episode, in remission ([facility] 2019    Osteoarthritis     Overactive bladder     RLS (restless legs syndrome) 2019    Ulcerative colitis ([facility]     Ulcerative colitis without complications ([facility]     Valvular heart disease     Vitamin D deficiency     Wrist fracture     Right       PHYSICAL EXAM:  CONSTITUTIONAL:  Well developed, well nourished    Vitals:    21 1038   BP: 110/70   Pulse: 63   Resp: 16   Weight: 155 lb 6.4 oz (70.5 kg)   Height: 5' 5\" (1.651 m)     HEAD & FACE: Normocephalic. Symmetric. EYES: No xanthelasma. Conjunctivae not injected. EARS, NOSE, MOUTH & THROAT: Good dentition. No oral pallor or cyanosis. NECK: No JVD at 30 degrees. No thyromegaly. RESPIRATORY: Clear to auscultation and percussion in all fields. No use of accessory muscle or intercostal retractions. CARDIOVASCULAR: Regular rate and rhythm.   No lifts or thrills on palpitation. Auscultation with normal S1-S2 in intensity and splitting. No carotid bruits. Abdominal aorta not enlarged. Femoral arteries without bruits. Pedal pulses 2+. No edema. ABDOMEN: Soft without hepatic or splenic enlargement. No tenderness. MUSCULOSKELETAL: No kyphosis or scoliosis of the back. Good muscle strength and tone. No muscle atrophy. Normal gait and ability to undergo exercise stress testing. EXTREMITIES: No clubbing or cyanosis. SKIN: No Xanthomas or ulcerations. NEUROLOGIC: Oriented to time, place and person. Normal mood and affect. LYMPHATIC:  No palpable neck or supraclavicular nodes. No splenomegaly. EKG: the EKG tracing was reviewed and found to reveal: Normal sinus rhythm.  ms. No change compared to prior tracing. ASSESSMENT:                                                     ORDERS:       Diagnosis Orders   1. PAF (paroxysmal atrial fibrillation) ([facility])  EKG 12 Lead   2. Essential hypertension     3. Pure hypercholesterolemia     4. Lightheadedness       Above assessment cardiac issues stable. PLAN:   See above orders. Medication reconciliation completed. Old records were reviewed and found to reveal: Hemoglobin 11.2 on 9/9/2021  Discussed issues that would prompt earlier evaluation. Same cardiac medications. Follow-up office visit in 6 months.

## 2021-09-20 ENCOUNTER — OFFICE VISIT (OUTPATIENT)
Dept: FAMILY MEDICINE CLINIC | Age: 76
End: 2021-09-20
Payer: MEDICARE

## 2021-09-20 VITALS
TEMPERATURE: 97.5 F | HEIGHT: 65 IN | BODY MASS INDEX: 25.83 KG/M2 | OXYGEN SATURATION: 98 % | WEIGHT: 155 LBS | DIASTOLIC BLOOD PRESSURE: 64 MMHG | HEART RATE: 57 BPM | SYSTOLIC BLOOD PRESSURE: 132 MMHG

## 2021-09-20 DIAGNOSIS — I48.0 PAF (PAROXYSMAL ATRIAL FIBRILLATION) (HCC): ICD-10-CM

## 2021-09-20 DIAGNOSIS — I10 ESSENTIAL HYPERTENSION: ICD-10-CM

## 2021-09-20 DIAGNOSIS — Z79.01 ANTICOAGULATED ON COUMADIN: ICD-10-CM

## 2021-09-20 LAB
INTERNATIONAL NORMALIZATION RATIO, POC: 1.9
PROTHROMBIN TIME, POC: 22.2

## 2021-09-20 PROCEDURE — G8400 PT W/DXA NO RESULTS DOC: HCPCS | Performed by: INTERNAL MEDICINE

## 2021-09-20 PROCEDURE — 99213 OFFICE O/P EST LOW 20 MIN: CPT | Performed by: INTERNAL MEDICINE

## 2021-09-20 PROCEDURE — 85610 PROTHROMBIN TIME: CPT | Performed by: INTERNAL MEDICINE

## 2021-09-20 PROCEDURE — 4040F PNEUMOC VAC/ADMIN/RCVD: CPT | Performed by: INTERNAL MEDICINE

## 2021-09-20 PROCEDURE — 1090F PRES/ABSN URINE INCON ASSESS: CPT | Performed by: INTERNAL MEDICINE

## 2021-09-20 PROCEDURE — 1036F TOBACCO NON-USER: CPT | Performed by: INTERNAL MEDICINE

## 2021-09-20 PROCEDURE — G8427 DOCREV CUR MEDS BY ELIG CLIN: HCPCS | Performed by: INTERNAL MEDICINE

## 2021-09-20 PROCEDURE — G8417 CALC BMI ABV UP PARAM F/U: HCPCS | Performed by: INTERNAL MEDICINE

## 2021-09-20 PROCEDURE — 1123F ACP DISCUSS/DSCN MKR DOCD: CPT | Performed by: INTERNAL MEDICINE

## 2021-09-20 RX ORDER — LISINOPRIL 10 MG/1
10 TABLET ORAL DAILY
Qty: 90 TABLET | Refills: 1 | Status: SHIPPED
Start: 2021-09-20 | End: 2022-02-23 | Stop reason: SDUPTHER

## 2021-09-20 NOTE — PROGRESS NOTES
[facility]     21  Alejandro Layne : 1945 Sex: female  Age: 67 y.o. Chief Complaint   Patient presents with    Office Visit for Anticoagulation Management     1 week INR       HPI    Patient presents today for anticoagulation management/INR check/follow-up. Reviewed my last note. Since have seen her last she has seen cardiology and I reviewed their note. They thought everything is stable and she is back in sinus rhythm currently they do not need to see her for 6 months now. We are attempting to get her INR adjusted. She was recently started on Coumadin for PAF. Last INR was 1.2 and she was placed on 6 mg daily. Today is 1.9. She has had no bleeding or thrombotic episodes. She brings in a list of blood pressures like I asked her to. We had decreased her lisinopril down to 10 mg because of some fatigue and weakness. This has helped. The numbers she brings in are in a good range. I told her going to leave her on the 10 mg daily. She is not having any further lightheadedness or dizziness. Her depression has been stable on her SSRI and tolerating this well. Lipids have been stable on statin medication. GERD continues to be stable on her PPI. Continues to live with daughter. She follows with GI, orthopedics and now cardiology  Review of Systems     Constitutional: Negative for activity change, appetite change, chills, diaphoresis, , fever and unexpected weight change.    HENT: Negative  Respiratory: Negative for cough,  and wheezing.   Positive for dyspnea on exertion-improved  Cardiovascular: Negative for palpitations and leg swelling.  Did have chest tightness-seeing cardiology and recent negative stress test-resolved  gastrointestinal: Negative for abdominal pain, blood in stool, constipation, diarrhea, nausea and vomiting.  Does have history of ulcerative colitis. -Quiescent at this point  Endocrine: Negative.    Genitourinary:  Negative for difficulty urinating, dysuria, frequency and hematuria.    No longer seeing urology  Musculoskeletal: Positive for arthralgias. negative for back pain,  and myalgias.       Skin: Negative.    Allergic/Immunologic: Negative for environmental allergies and immunocompromised state. Neurological: Negative for, weakness, light-headedness, numbness and headaches. Hematological: Negative.  Positive for dizziness/lightheadedness-improved  psychiatric/Behavioral:        Depression has improved.  She is on medication.  Living with her daughter that is working out well  Things have been a little bit more trying with recent COVID-19 pandemic status and her spending more time at home. -Improving           REST OF PERTINENT ROS GONE OVER AND WAS NEGATIVE. Current Outpatient Medications:     lisinopril (PRINIVIL;ZESTRIL) 10 MG tablet, Take 1 tablet by mouth daily, Disp: 90 tablet, Rfl: 1    metoprolol succinate (TOPROL XL) 50 MG extended release tablet, Take 1 tablet by mouth daily, Disp: 90 tablet, Rfl: 3    warfarin (COUMADIN) 5 MG tablet, Take 1 tablet by mouth daily, Disp: 90 tablet, Rfl: 1    nitroGLYCERIN (NITROSTAT) 0.4 MG SL tablet, Place 1 tablet under the tongue every 5 minutes as needed for Chest pain up to max of 3 total doses.  If no relief after 1 dose, call 911., Disp: 25 tablet, Rfl: 0    warfarin (COUMADIN) 1 MG tablet, 4 pills po qpm (Patient taking differently: 1 mg daily ), Disp: 60 tablet, Rfl: 3    Cyanocobalamin (B-12) 1000 MCG TABS, Take by mouth daily, Disp: , Rfl:     Fexofenadine-Pseudoephedrine (ALLEGRA-D 24 HOUR PO), Take by mouth, Disp: , Rfl:     MELATONIN ER PO, Take by mouth nightly, Disp: , Rfl:     Propylene Glycol-Glycerin (SOOTHE OP), Apply to eye, Disp: , Rfl:     potassium chloride (KLOR-CON M) 20 MEQ extended release tablet, Take 1 tablet by mouth daily, Disp: 90 tablet, Rfl: 1    hydroCHLOROthiazide (MICROZIDE) 12.5 MG capsule, Take 1 capsule by mouth daily, Disp: 90 capsule, Rfl: 1    escitalopram (LEXAPRO) 10 MG tablet, Take 1 tablet by mouth daily, Disp: 90 tablet, Rfl: 1    pravastatin (PRAVACHOL) 20 MG tablet, Take 1 tablet by mouth daily, Disp: 90 tablet, Rfl: 1    vitamin D 25 MCG (1000 UT) CAPS, Take by mouth daily , Disp: , Rfl:     omeprazole (PRILOSEC) 20 MG delayed release capsule, Take 20 mg by mouth daily , Disp: , Rfl:     Biotin 41799 MCG TABS, Take by mouth daily , Disp: , Rfl:   Allergies   Allergen Reactions    Erythromycin Hives    Penicillin G     Azithromycin     Penicillins Hives    Propoxyphene        Past Medical History:   Diagnosis Date    Anemia     Cholelithiasis     Chronic congestion of paranasal sinus     Colon polyps     Depression     Diastolic dysfunction     Diverticulosis     HTN (hypertension)     Hyperlipidemia 2019    Irritable bladder     Lumbar degenerative disc disease     Lumbar herniated disc     Lumbar spinal stenosis     Major depressive disorder with single episode, in remission ([facility] 2019    Osteoarthritis     Overactive bladder     RLS (restless legs syndrome) 2019    Ulcerative colitis ([facility]     Ulcerative colitis without complications ([facility]     Valvular heart disease     Vitamin D deficiency     Wrist fracture     Right     Past Surgical History:   Procedure Laterality Date    BLADDER SUSPENSION      BREAST LUMPECTOMY Left     x 2, negative    BUNIONECTOMY      CARDIAC CATHETERIZATION  2012    CARPAL TUNNEL RELEASE Right     CATARACT REMOVAL WITH IMPLANT Right     COLONOSCOPY  2014    HYSTERECTOMY, TOTAL ABDOMINAL      non-cancerous    JOINT REPLACEMENT Bilateral     hip    KNEE ARTHROSCOPY Left     RECTOCELE REPAIR       Family History   Problem Relation Age of Onset    Heart Disease Father          age 80   Mahendra Salm Diabetes Mother          age 80    Heart Disease Mother         CHF    Other Brother         PVD, obesity    Cancer Paternal Grandmother         breast     Social History     Socioeconomic History    Marital status:      Spouse name: Not on file    Number of children: Not on file    Years of education: Not on file    Highest education level: Not on file   Occupational History    Not on file   Tobacco Use    Smoking status: Never Smoker    Smokeless tobacco: Never Used   Vaping Use    Vaping Use: Never used   Substance and Sexual Activity    Alcohol use: Yes     Comment: occasionally    Drug use: No    Sexual activity: Not on file   Other Topics Concern    Not on file   Social History Narrative    Not on file     Social Determinants of Health     Financial Resource Strain: Low Risk     Difficulty of Paying Living Expenses: Not hard at all   Food Insecurity: No Food Insecurity    Worried About [employer] [employer] in the Last Year: Never true    [address] in the Last Year: Never true   Transportation Needs:     Lack of Transportation (Medical):  Lack of Transportation (Non-Medical):    Physical Activity:     Days of Exercise per Week:     Minutes of Exercise per Session:    Stress:     Feeling of Stress :    Social Connections:     Frequency of Communication with Friends and Family:     Frequency of Social Gatherings with Friends and Family:     Attends Ep[employer] Services:     Active Member of Clubs or Organizations:     Attends Club or Organization Meetings:     Marital Status:    Intimate Partner Violence:     Fear of Current or Ex-Partner:     Emotionally Abused:     Physically Abused:     Sexually Abused:        Vitals:    09/20/21 1154   BP: 132/64   Pulse: 57   Temp: 97.5 °F (36.4 °C)   TempSrc: Temporal   SpO2: 98%   Weight: 155 lb (70.3 kg)   Height: 5' 5\" (1.651 m)       Physical Exam    Physical exam not performed today. Assessment and Plan:  Margie Mukherjee was seen today for office visit for anticoagulation management.     Diagnoses and all orders for this visit:    Anticoagulated on Coumadin  -     POCT INR    PAF (paroxysmal atrial fibrillation) ([facility])    Essential hypertension    Other orders  -     lisinopril (PRINIVIL;ZESTRIL) 10 MG tablet; Take 1 tablet by mouth daily      Plan: We will continue 6 mg of Coumadin after taking 12 mg today only. Watch for any bleeding. Recheck INR in 2 weeks. If okay at that time can extend out to monthly. Follow-up with above consultants. Will be due for her 3-month follow-up visit in the next month or so. Notify us of problems in the interim. She did ask about driving. She states she is no longer dizzy. I told her she could trial short distances and see how she does. No follow-ups on file. Seen By:  Griselda Canton, MD      *Document was created using voice recognition software. Note was reviewed however may contain grammatical errors.

## 2021-10-04 ENCOUNTER — OFFICE VISIT (OUTPATIENT)
Dept: FAMILY MEDICINE CLINIC | Age: 76
End: 2021-10-04
Payer: MEDICARE

## 2021-10-04 VITALS
WEIGHT: 153.8 LBS | HEIGHT: 65 IN | DIASTOLIC BLOOD PRESSURE: 64 MMHG | HEART RATE: 64 BPM | SYSTOLIC BLOOD PRESSURE: 132 MMHG | OXYGEN SATURATION: 99 % | BODY MASS INDEX: 25.62 KG/M2 | TEMPERATURE: 96.9 F

## 2021-10-04 DIAGNOSIS — I10 ESSENTIAL HYPERTENSION: ICD-10-CM

## 2021-10-04 DIAGNOSIS — I48.0 PAF (PAROXYSMAL ATRIAL FIBRILLATION) (HCC): ICD-10-CM

## 2021-10-04 DIAGNOSIS — Z79.01 ANTICOAGULATED ON COUMADIN: ICD-10-CM

## 2021-10-04 LAB
INTERNATIONAL NORMALIZATION RATIO, POC: 2.8
PROTHROMBIN TIME, POC: 33.8

## 2021-10-04 PROCEDURE — 85610 PROTHROMBIN TIME: CPT | Performed by: INTERNAL MEDICINE

## 2021-10-04 PROCEDURE — G8400 PT W/DXA NO RESULTS DOC: HCPCS | Performed by: INTERNAL MEDICINE

## 2021-10-04 PROCEDURE — G8484 FLU IMMUNIZE NO ADMIN: HCPCS | Performed by: INTERNAL MEDICINE

## 2021-10-04 PROCEDURE — G8417 CALC BMI ABV UP PARAM F/U: HCPCS | Performed by: INTERNAL MEDICINE

## 2021-10-04 PROCEDURE — G8427 DOCREV CUR MEDS BY ELIG CLIN: HCPCS | Performed by: INTERNAL MEDICINE

## 2021-10-04 PROCEDURE — 4040F PNEUMOC VAC/ADMIN/RCVD: CPT | Performed by: INTERNAL MEDICINE

## 2021-10-04 PROCEDURE — 1090F PRES/ABSN URINE INCON ASSESS: CPT | Performed by: INTERNAL MEDICINE

## 2021-10-04 PROCEDURE — 1123F ACP DISCUSS/DSCN MKR DOCD: CPT | Performed by: INTERNAL MEDICINE

## 2021-10-04 PROCEDURE — 99213 OFFICE O/P EST LOW 20 MIN: CPT | Performed by: INTERNAL MEDICINE

## 2021-10-04 PROCEDURE — 1036F TOBACCO NON-USER: CPT | Performed by: INTERNAL MEDICINE

## 2021-10-04 NOTE — PROGRESS NOTES
[facility]     10/4/21  Contreras Link : 1945 Sex: female  Age: 67 y.o. Chief Complaint   Patient presents with    Office Visit for Anticoagulation Management     2 wk INR    Hypertension       HPI  Patient presents today accompanied by daughter for INR check/anticoagulation management/follow-up. INR today was 2.8.  2 weeks ago was 1.9. No bleeding or thrombotic episodes. Has been feeling well. Did have a slip and fall here in the past week but states she really did not hurt herself. Blood pressure that she gets me had a couple marginal readings. Repeat reading by myself here today was a little bit high at 150/68. I did tell them to monitor this closely as it might have to bump her medicine up just a little bit. Her depression has been stable on her SSRI and tolerating this well.  Lipids have been stable on statin medication.  GERD continues to be stable on her PPI. Continues to live with daughter. She follows with GI, orthopedics and now cardiology      Review of Systems     Constitutional: Negative for activity change, appetite change, chills, diaphoresis, , fever and unexpected weight change.    HENT: Negative  Respiratory: Negative for cough,  and wheezing.   Positive for dyspnea on exertion-improved  Cardiovascular: Negative for palpitations and leg swelling.  Did have chest tightness-seeing cardiology and recent negative stress test-resolved--recent PAF on anticoagulation  gastrointestinal: Negative for abdominal pain, blood in stool, constipation, diarrhea, nausea and vomiting.  Does have history of ulcerative colitis. -Quiescent at this point  Endocrine: Negative.    Genitourinary:  Negative for difficulty urinating, dysuria, frequency and hematuria.    No longer seeing urology  Musculoskeletal: Positive for arthralgias. negative for back pain,  and myalgias.       Skin: Negative.    Allergic/Immunologic: Negative for environmental allergies and immunocompromised state. Neurological: Negative for, weakness, light-headedness, numbness and headaches. Hematological: Negative.  Positive for dizziness/lightheadedness-improved  psychiatric/Behavioral:        Depression has improved.  She is on medication.  Living with her daughter that is working out well  Things have been a little bit more trying with recent COVID-19 pandemic status and her spending more time at home. -Improving            REST OF PERTINENT ROS GONE OVER AND WAS NEGATIVE. Current Outpatient Medications:     lisinopril (PRINIVIL;ZESTRIL) 10 MG tablet, Take 1 tablet by mouth daily, Disp: 90 tablet, Rfl: 1    metoprolol succinate (TOPROL XL) 50 MG extended release tablet, Take 1 tablet by mouth daily, Disp: 90 tablet, Rfl: 3    warfarin (COUMADIN) 5 MG tablet, Take 1 tablet by mouth daily, Disp: 90 tablet, Rfl: 1    nitroGLYCERIN (NITROSTAT) 0.4 MG SL tablet, Place 1 tablet under the tongue every 5 minutes as needed for Chest pain up to max of 3 total doses.  If no relief after 1 dose, call 911., Disp: 25 tablet, Rfl: 0    warfarin (COUMADIN) 1 MG tablet, 4 pills po qpm (Patient taking differently: 1 mg daily ), Disp: 60 tablet, Rfl: 3    Cyanocobalamin (B-12) 1000 MCG TABS, Take by mouth daily, Disp: , Rfl:     Fexofenadine-Pseudoephedrine (ALLEGRA-D 24 HOUR PO), Take by mouth, Disp: , Rfl:     MELATONIN ER PO, Take by mouth nightly, Disp: , Rfl:     Propylene Glycol-Glycerin (SOOTHE OP), Apply to eye, Disp: , Rfl:     potassium chloride (KLOR-CON M) 20 MEQ extended release tablet, Take 1 tablet by mouth daily, Disp: 90 tablet, Rfl: 1    hydroCHLOROthiazide (MICROZIDE) 12.5 MG capsule, Take 1 capsule by mouth daily, Disp: 90 capsule, Rfl: 1    escitalopram (LEXAPRO) 10 MG tablet, Take 1 tablet by mouth daily, Disp: 90 tablet, Rfl: 1    pravastatin (PRAVACHOL) 20 MG tablet, Take 1 tablet by mouth daily, Disp: 90 tablet, Rfl: 1    vitamin D 25 MCG (1000 UT) CAPS, Take by mouth daily , Disp: , Rfl:     omeprazole (PRILOSEC) 20 MG delayed release capsule, Take 20 mg by mouth daily , Disp: , Rfl:     Biotin 17373 MCG TABS, Take by mouth daily , Disp: , Rfl:   Allergies   Allergen Reactions    Erythromycin Hives    Penicillin G     Azithromycin     Penicillins Hives    Propoxyphene        Past Medical History:   Diagnosis Date    Anemia     Cholelithiasis     Chronic congestion of paranasal sinus     Colon polyps     Depression     Diastolic dysfunction     Diverticulosis     HTN (hypertension)     Hyperlipidemia 2019    Irritable bladder     Lumbar degenerative disc disease     Lumbar herniated disc     Lumbar spinal stenosis     Major depressive disorder with single episode, in remission ([address] 2019    Osteoarthritis     Overactive bladder     RLS (restless legs syndrome) 2019    [facility]     Ulcerative colitis without complications ([facility]     Valvular heart disease     Vitamin D deficiency     Wrist fracture     Right     Past Surgical History:   Procedure Laterality Date    BLADDER SUSPENSION      BREAST LUMPECTOMY Left     x 2, negative    BUNIONECTOMY      CARDIAC CATHETERIZATION  2012    CARPAL TUNNEL RELEASE Right     CATARACT REMOVAL WITH IMPLANT Right     COLONOSCOPY  2014    HYSTERECTOMY, TOTAL ABDOMINAL      non-cancerous    JOINT REPLACEMENT Bilateral     hip    KNEE ARTHROSCOPY Left     RECTOCELE REPAIR       Family History   Problem Relation Age of Onset    Heart Disease Father          age 80   [facility] Diabetes Mother          age 80    Heart Disease Mother         CHF    Other Brother         PVD, obesity    Cancer Paternal Grandmother         breast     Social History     Socioeconomic History    Marital status:      Spouse name: Not on file    Number of children: Not on file    Years of education: Not on file    Highest education level: Not on file   Occupational History    Not on file   Tobacco Use    Smoking status: Never Smoker    Smokeless tobacco: Never Used   Vaping Use    Vaping Use: Never used   Substance and Sexual Activity    Alcohol use: Yes     Comment: occasionally    Drug use: No    Sexual activity: Not on file   Other Topics Concern    Not on file   Social History Narrative    Not on file     Social Determinants of Health     Financial Resource Strain: Low Risk     Difficulty of Paying Living Expenses: Not hard at all   Food Insecurity: No Food Insecurity    Worried About [address] in the Last Year: Never true    [facility] in the Last Year: Never true   Transportation Needs:     Lack of Transportation (Medical):  Lack of Transportation (Non-Medical):    Physical Activity:     Days of Exercise per Week:     Minutes of Exercise per Session:    Stress:     Feeling of Stress :    Social Connections:     Frequency of Communication with Friends and Family:     Frequency of Social Gatherings with Friends and Family:     Attends Religion Services:     Active Member of Clubs or Organizations:     Attends Club or Organization Meetings:     Marital Status:    Intimate Partner Violence:     Fear of Current or Ex-Partner:     Emotionally Abused:     Physically Abused:     Sexually Abused:        Vitals:    10/04/21 1440   BP: 132/64   Pulse: 64   Temp: 96.9 °F (36.1 °C)   TempSrc: Temporal   SpO2: 99%   Weight: 153 lb 12.8 oz (69.8 kg)   Height: 5' 5\" (1.651 m)       Physical Exam    Physical exam not performed today. Assessment and Plan:  Avelino Ortiz was seen today for office visit for anticoagulation management and hypertension. Diagnoses and all orders for this visit:    Anticoagulated on Coumadin  -     POCT INR    Essential hypertension    PA[facility]ox[facility]) [facility]    : I asked him to watch her blood pressure closely as it was 150 range today and she did have a couple higher numbers at home.   If stays above the 140 range we will need to raise her lisinopril but will hold off right now. Regarding her Coumadin going to have her stay on her 6 mg Monday through Friday and 3 mg Saturday and Sunday. We will recheck an INR in a regular 3-month visit in 1 month. Fall precautions. I did ask him to bring the blood pressure machine in next time for comparison purposes. Return in about 1 month (around 11/4/2021) for 3 month fu and inr. Seen By:  Ralph Good MD      *Document was created using voice recognition software. Note was reviewed however may contain grammatical errors.

## 2021-11-04 ENCOUNTER — PATIENT MESSAGE (OUTPATIENT)
Dept: FAMILY MEDICINE CLINIC | Age: 76
End: 2021-11-04

## 2021-11-04 ENCOUNTER — NURSE ONLY (OUTPATIENT)
Dept: FAMILY MEDICINE CLINIC | Age: 76
End: 2021-11-04
Payer: MEDICARE

## 2021-11-04 ENCOUNTER — ANTI-COAG VISIT (OUTPATIENT)
Dept: FAMILY MEDICINE CLINIC | Age: 76
End: 2021-11-04

## 2021-11-04 ENCOUNTER — TELEPHONE (OUTPATIENT)
Dept: FAMILY MEDICINE CLINIC | Age: 76
End: 2021-11-04

## 2021-11-04 DIAGNOSIS — I48.0 PAF (PAROXYSMAL ATRIAL FIBRILLATION) (HCC): Primary | ICD-10-CM

## 2021-11-04 LAB
INTERNATIONAL NORMALIZATION RATIO, POC: 1.7
PROTHROMBIN TIME, POC: 20.4

## 2021-11-04 PROCEDURE — 85610 PROTHROMBIN TIME: CPT | Performed by: INTERNAL MEDICINE

## 2021-11-04 NOTE — TELEPHONE ENCOUNTER
From: Thania Layne  To: Tessa Ayoub MD  Sent: 11/4/2021 12:44 PM EDT  Subject: Prescription Question    I called this morning and talked to nurse on my Mom. She was bobbichayo suman to send dr Mendoza Powell a message about my concern and call me back. I have not heard from her yet. She was going to see if he wanted her to come in for INR test since ER failed to test that yesterday. Reason we went. Anyhow they also prescribed 2 meds. For the diverticulitis they said she has. Am I supposed to fill these? Are they ok with the warafin since they claimed now to not know she was on it. Cupronickel and metronidazole.

## 2021-11-04 NOTE — TELEPHONE ENCOUNTER
Adin Gaucher - Daughter   [phone]       She took her mom to [facility] as advised and was there until after 8:00pm last night. She asked several people several times to check her INR, but that was never done. They did a lot of testing and finally diagnosed her with Bleeding Diverticulitis. Medications were sent to the pharmacy, but it was too late to pick them up last night. She got a call from a physician at [address] [place] [address] last night to bring her mom back to the ER to get an INR check, but she refused to get her mom up and go. She is asking if you want her to come in here today and have it checked? Do you want to see her, or have her come in to see the nurse for that?

## 2021-11-12 ENCOUNTER — OFFICE VISIT (OUTPATIENT)
Dept: FAMILY MEDICINE CLINIC | Age: 76
End: 2021-11-12
Payer: MEDICARE

## 2021-11-12 VITALS
DIASTOLIC BLOOD PRESSURE: 60 MMHG | WEIGHT: 153 LBS | TEMPERATURE: 97.5 F | OXYGEN SATURATION: 96 % | HEIGHT: 65 IN | SYSTOLIC BLOOD PRESSURE: 100 MMHG | BODY MASS INDEX: 25.49 KG/M2 | HEART RATE: 69 BPM

## 2021-11-12 DIAGNOSIS — Z79.01 ANTICOAGULATED ON COUMADIN: Primary | ICD-10-CM

## 2021-11-12 DIAGNOSIS — K57.92 DIVERTICULITIS: ICD-10-CM

## 2021-11-12 DIAGNOSIS — I48.0 PAF (PAROXYSMAL ATRIAL FIBRILLATION) (HCC): ICD-10-CM

## 2021-11-12 LAB
INTERNATIONAL NORMALIZATION RATIO, POC: 4.1
PROTHROMBIN TIME, POC: 49.4

## 2021-11-12 PROCEDURE — G8400 PT W/DXA NO RESULTS DOC: HCPCS | Performed by: INTERNAL MEDICINE

## 2021-11-12 PROCEDURE — 1036F TOBACCO NON-USER: CPT | Performed by: INTERNAL MEDICINE

## 2021-11-12 PROCEDURE — 4040F PNEUMOC VAC/ADMIN/RCVD: CPT | Performed by: INTERNAL MEDICINE

## 2021-11-12 PROCEDURE — 85610 PROTHROMBIN TIME: CPT | Performed by: INTERNAL MEDICINE

## 2021-11-12 PROCEDURE — G8417 CALC BMI ABV UP PARAM F/U: HCPCS | Performed by: INTERNAL MEDICINE

## 2021-11-12 PROCEDURE — G8484 FLU IMMUNIZE NO ADMIN: HCPCS | Performed by: INTERNAL MEDICINE

## 2021-11-12 PROCEDURE — 99213 OFFICE O/P EST LOW 20 MIN: CPT | Performed by: INTERNAL MEDICINE

## 2021-11-12 PROCEDURE — 1123F ACP DISCUSS/DSCN MKR DOCD: CPT | Performed by: INTERNAL MEDICINE

## 2021-11-12 PROCEDURE — G8427 DOCREV CUR MEDS BY ELIG CLIN: HCPCS | Performed by: INTERNAL MEDICINE

## 2021-11-12 PROCEDURE — 1090F PRES/ABSN URINE INCON ASSESS: CPT | Performed by: INTERNAL MEDICINE

## 2021-11-12 RX ORDER — CIPROFLOXACIN 500 MG/1
TABLET, FILM COATED ORAL
COMMUNITY
Start: 2021-11-03 | End: 2022-01-31

## 2021-11-12 RX ORDER — METRONIDAZOLE 500 MG/1
TABLET ORAL
COMMUNITY
Start: 2021-11-03 | End: 2022-01-31

## 2021-11-14 NOTE — PROGRESS NOTES
[facility]     21  Yoana Layne : 1945 Sex: female  Age: 67 y.o. Chief Complaint   Patient presents with   [facility] Visit for Anticoagulation Management     INR check, was placed on an antibiotic for diverticulitis       HPI    Patient presents today accompanied by daughter for INR check/anticoagulation management/follow-up. She recently been in the emergency room for abdominal pain and  found to have diverticulitis. She was started on antibiotic. INR today was 4.1 up from 1.7 most likely related to the antibiotics which she is now finishing. She is having no bleeding or thrombotic episodes. Her abdominal pain has subsided. No fever or chills. Reviewed the emergency room note. She had been placed on Cipro and Flagyl. Review of Systems     Constitutional: Negative for activity change, appetite change, chills, diaphoresis, , fever and unexpected weight change.    HENT: Negative  Respiratory: Negative for cough,  and wheezing.   Positive for dyspnea on exertion-improved  Cardiovascular: Negative for palpitations and leg swelling.  Did have chest tightness-seeing cardiology and recent negative stress test-resolved--recent PAF on anticoagulation  gastrointestinal: Negative for abdominal pain, blood in stool, constipation, diarrhea, nausea and vomiting.  Does have history of ulcerative colitis. -Quiescent at this point  Endocrine: Negative.    Genitourinary:  Negative for difficulty urinating, dysuria, frequency and hematuria.    No longer seeing urology  Musculoskeletal: Positive for arthralgias. negative for back pain,  and myalgias.       Skin: Negative.    Allergic/Immunologic: Negative for environmental allergies and immunocompromised state. Neurological: Negative for, weakness, light-headedness, numbness and headaches.    Hematological: Negative.  Positive for dizziness/lightheadedness-improved  psychiatric/Behavioral:        Depression has improved. Charmaine Gabriel is on medication. Sonia Moody with her daughter that is working out well  Things have been a little bit more trying with recent COVID-19 pandemic status and her spending more time at home. -Improving       REST OF PERTINENT ROS GONE OVER AND WAS NEGATIVE. Current Outpatient Medications:     ciprofloxacin (CIPRO) 500 MG tablet, TAKE 1 TABLET BY MOUTH TWICE DAILY, Disp: , Rfl:     metroNIDAZOLE (FLAGYL) 500 MG tablet, TAKE 1 TABLET BY MOUTH EVERY 8 HOURS, Disp: , Rfl:     lisinopril (PRINIVIL;ZESTRIL) 10 MG tablet, Take 1 tablet by mouth daily, Disp: 90 tablet, Rfl: 1    metoprolol succinate (TOPROL XL) 50 MG extended release tablet, Take 1 tablet by mouth daily, Disp: 90 tablet, Rfl: 3    warfarin (COUMADIN) 5 MG tablet, Take 1 tablet by mouth daily, Disp: 90 tablet, Rfl: 1    nitroGLYCERIN (NITROSTAT) 0.4 MG SL tablet, Place 1 tablet under the tongue every 5 minutes as needed for Chest pain up to max of 3 total doses.  If no relief after 1 dose, call 911., Disp: 25 tablet, Rfl: 0    warfarin (COUMADIN) 1 MG tablet, 4 pills po qpm (Patient taking differently: 1 mg daily ), Disp: 60 tablet, Rfl: 3    Cyanocobalamin (B-12) 1000 MCG TABS, Take by mouth daily, Disp: , Rfl:     Fexofenadine-Pseudoephedrine (ALLEGRA-D 24 HOUR PO), Take by mouth, Disp: , Rfl:     MELATONIN ER PO, Take by mouth nightly, Disp: , Rfl:     Propylene Glycol-Glycerin (SOOTHE OP), Apply to eye, Disp: , Rfl:     potassium chloride (KLOR-CON M) 20 MEQ extended release tablet, Take 1 tablet by mouth daily, Disp: 90 tablet, Rfl: 1    hydroCHLOROthiazide (MICROZIDE) 12.5 MG capsule, Take 1 capsule by mouth daily, Disp: 90 capsule, Rfl: 1    escitalopram (LEXAPRO) 10 MG tablet, Take 1 tablet by mouth daily, Disp: 90 tablet, Rfl: 1    pravastatin (PRAVACHOL) 20 MG tablet, Take 1 tablet by mouth daily, Disp: 90 tablet, Rfl: 1    vitamin D 25 MCG (1000 UT) CAPS, Take by mouth daily , Disp: , Rfl:     omeprazole (PRILOSEC) 20 MG delayed release capsule, Take 20 mg by mouth daily , Disp: , Rfl:     Biotin 10782 MCG TABS, Take by mouth daily , Disp: , Rfl:   Allergies   Allergen Reactions    Erythromycin Hives    Penicillin G     Azithromycin     Penicillins Hives    Propoxyphene        Past Medical History:   Diagnosis Date    Anemia     Cholelithiasis     Chronic congestion of paranasal sinus     Colon polyps     Depression     Diastolic dysfunction     Diverticulosis     HTN (hypertension)     Hyperlipidemia 2019    Irritable bladder     Lumbar degenerative disc disease     Lumbar herniated disc     Lumbar spinal stenosis     Major depressive disorder with single episode, in [facility] 2019    Osteoarthritis     Overactive bladder     RLS (restless legs syndrome) 2019    Ulcerative colitis ([facility]     [facility]     Valvular heart disease     Vitamin D deficiency     Wrist fracture     Right     Past Surgical History:   Procedure Laterality Date    BLADDER SUSPENSION      BREAST LUMPECTOMY Left     x 2, negative    BUNIONECTOMY      CARDIAC CATHETERIZATION  2012    CARPAL TUNNEL RELEASE Right     CATARACT REMOVAL WITH IMPLANT Right     COLONOSCOPY  2014    HYSTERECTOMY, TOTAL ABDOMINAL      non-cancerous    JOINT REPLACEMENT Bilateral     hip    KNEE ARTHROSCOPY Left     RECTOCELE REPAIR       Family History   Problem Relation Age of Onset    Heart Disease Father          age 80    Diabetes Mother          age 80    Heart Disease Mother         CHF    Other Brother         PVD, obesity    Cancer Paternal Grandmother         breast     Social History     Socioeconomic History    Marital status:       Spouse name: Not on file    Number of children: Not on file    Years of education: Not on file    Highest education level: Not on file   Occupational History    Not on file   Tobacco Use    Smoking status: Never Smoker    Smokeless tobacco: Never Used   Vaping Use    Vaping Use: Never used   Substance and Sexual Activity    Alcohol use: Yes     Comment: occasionally    Drug use: No    Sexual activity: Not on file   Other Topics Concern    Not on file   Social History Narrative    Not on file     Social Determinants of Health     Financial Resource Strain: Low Risk     Difficulty of Paying Living Expenses: Not hard at all   Food Insecurity: No Food Insecurity    Worried About [address] in the Last Year: Never true    [address] [facility] [address] in the Last Year: Never true   Transportation Needs:     Lack of Transportation (Medical): Not on file    Lack of Transportation (Non-Medical): Not on file   Physical Activity:     Days of Exercise per Week: Not on file    Minutes of Exercise per Session: Not on file   Stress:     Feeling of Stress : Not on file   Social Connections:     Frequency of Communication with Friends and Family: Not on file    Frequency of Social Gatherings with Friends and Family: Not on file    Attends Shinto Services: Not on file    Active Member of [address] [place] [address] or Organizations: Not on file    Attends Club or Organization Meetings: Not on file    Marital Status: Not on file   Intimate Partner Violence:     Fear of Current or Ex-Partner: Not on file    Emotionally Abused: Not on file    Physically Abused: Not on file    Sexually Abused: Not on file   Housing Stability:     Unable to Pay for Housing in the Last Year: Not on file    Number of [place] in the Last Year: Not on file    Unstable Housing in the Last Year: Not on file       Vitals:    11/12/21 0755   BP: 100/60   Pulse: 69   Temp: 97.5 °F (36.4 °C)   TempSrc: Temporal   SpO2: 96%   Weight: 153 lb (69.4 kg)   Height: 5' 5\" (1.651 m)       Physical Exam    Physical exam not performed today. Assessment and Plan:  Mandy Lopez was seen today for office visit for anticoagulation management.     Diagnoses and all orders for this visit:    Anticoagulated on Coumadin  -     POCT INR    PAF (paroxysmal atrial fibrillation) ([address]    Diverticulitis    Plan: Hold Coumadin today and tomorrow then back to same dose again. She will be off the antibiotics and hopefully get INR back to normal range. For any bleeding. Look over 8/30/2021 note when I see patient next. Also again reviewed ER note. Fall precautions. Notify us of problems in the interim. Return in about 1 week (around 11/19/2021) for inr and reg visit. Seen By:  Mady Martinez MD      *Document was created using voice recognition software. Note was reviewed however may contain grammatical errors.

## 2021-11-22 ENCOUNTER — OFFICE VISIT (OUTPATIENT)
Dept: FAMILY MEDICINE CLINIC | Age: 76
End: 2021-11-22
Payer: MEDICARE

## 2021-11-22 VITALS
HEIGHT: 65 IN | HEART RATE: 64 BPM | WEIGHT: 153 LBS | BODY MASS INDEX: 25.49 KG/M2 | OXYGEN SATURATION: 100 % | TEMPERATURE: 97.1 F | DIASTOLIC BLOOD PRESSURE: 70 MMHG | SYSTOLIC BLOOD PRESSURE: 116 MMHG

## 2021-11-22 DIAGNOSIS — E78.5 HYPERLIPIDEMIA, UNSPECIFIED HYPERLIPIDEMIA TYPE: ICD-10-CM

## 2021-11-22 DIAGNOSIS — I10 ESSENTIAL HYPERTENSION: ICD-10-CM

## 2021-11-22 DIAGNOSIS — F32.5 MAJOR DEPRESSIVE DISORDER WITH SINGLE EPISODE, IN REMISSION (HCC): ICD-10-CM

## 2021-11-22 DIAGNOSIS — Z79.01 ANTICOAGULATED ON COUMADIN: ICD-10-CM

## 2021-11-22 LAB
INTERNATIONAL NORMALIZATION RATIO, POC: 1.5
PROTHROMBIN TIME, POC: 17.7

## 2021-11-22 PROCEDURE — 1090F PRES/ABSN URINE INCON ASSESS: CPT | Performed by: INTERNAL MEDICINE

## 2021-11-22 PROCEDURE — 85610 PROTHROMBIN TIME: CPT | Performed by: INTERNAL MEDICINE

## 2021-11-22 PROCEDURE — 1123F ACP DISCUSS/DSCN MKR DOCD: CPT | Performed by: INTERNAL MEDICINE

## 2021-11-22 PROCEDURE — G8417 CALC BMI ABV UP PARAM F/U: HCPCS | Performed by: INTERNAL MEDICINE

## 2021-11-22 PROCEDURE — 99214 OFFICE O/P EST MOD 30 MIN: CPT | Performed by: INTERNAL MEDICINE

## 2021-11-22 PROCEDURE — G8400 PT W/DXA NO RESULTS DOC: HCPCS | Performed by: INTERNAL MEDICINE

## 2021-11-22 PROCEDURE — G0008 ADMIN INFLUENZA VIRUS VAC: HCPCS | Performed by: INTERNAL MEDICINE

## 2021-11-22 PROCEDURE — 1036F TOBACCO NON-USER: CPT | Performed by: INTERNAL MEDICINE

## 2021-11-22 PROCEDURE — G8484 FLU IMMUNIZE NO ADMIN: HCPCS | Performed by: INTERNAL MEDICINE

## 2021-11-22 PROCEDURE — G8427 DOCREV CUR MEDS BY ELIG CLIN: HCPCS | Performed by: INTERNAL MEDICINE

## 2021-11-22 PROCEDURE — 90694 VACC AIIV4 NO PRSRV 0.5ML IM: CPT | Performed by: INTERNAL MEDICINE

## 2021-11-22 PROCEDURE — 4040F PNEUMOC VAC/ADMIN/RCVD: CPT | Performed by: INTERNAL MEDICINE

## 2021-11-22 RX ORDER — POTASSIUM CHLORIDE 20 MEQ/1
20 TABLET, EXTENDED RELEASE ORAL DAILY
Qty: 90 TABLET | Refills: 1 | Status: SHIPPED
Start: 2021-11-22 | End: 2022-03-15 | Stop reason: CLARIF

## 2021-11-22 RX ORDER — ESCITALOPRAM OXALATE 10 MG/1
10 TABLET ORAL DAILY
Qty: 90 TABLET | Refills: 1 | Status: SHIPPED
Start: 2021-11-22 | End: 2022-02-23 | Stop reason: SDUPTHER

## 2021-11-22 RX ORDER — PRAVASTATIN SODIUM 20 MG
20 TABLET ORAL DAILY
Qty: 90 TABLET | Refills: 1 | Status: SHIPPED
Start: 2021-11-22 | End: 2022-02-23 | Stop reason: SDUPTHER

## 2021-11-22 RX ORDER — HYDROCHLOROTHIAZIDE 12.5 MG/1
12.5 CAPSULE, GELATIN COATED ORAL DAILY
Qty: 90 CAPSULE | Refills: 1 | Status: SHIPPED
Start: 2021-11-22 | End: 2022-03-15 | Stop reason: CLARIF

## 2021-11-22 NOTE — PROGRESS NOTES
[facility]     21  Joe Copeland : 1945 Sex: female  Age: 67 y.o. Chief Complaint   Patient presents with   [facility] Visit for Anticoagulation Management     1 wk INR & regular visit       HPI  Patient presents today for follow-up visit accompanied by her daughter. Looked over last several notes. Much is happened in the last couple months. She has been treated for PAF and currently in sinus rhythm. She is anticoagulated with Coumadin and subtherapeutic today at 1.5. We will make adjustments to have her take 10 mg today then 6 mg daily and recheck an INR in 2 weeks. Blood pressures have been in a good range. Depression has been stable on her SSRI. She is tolerating this well. Lipids have been stable on statin medication. GERD has been stable on her PPI. Continues to live with her daughter successfully. I did review her CAT scan of the abdomen done on 11/3 demonstrating her diverticulitis they do state recommend direct visualization to exclude underlying neoplasia however she just had a colonoscopy done 3 months prior and do not see the need to have this checked again. We did discuss her cholelithiasis which she is asymptomatic with. She follows with GI, orthopedics and now cardiology         Review of Systems   Constitutional: Negative for activity change, appetite change, chills, diaphoresis, , fever and unexpected weight change.    HENT: Negative  Respiratory: Negative for cough,  and wheezing.   Positive for dyspnea on exertion-improved  Cardiovascular: Negative for palpitations and leg swelling.  Did have chest tightness-seeing cardiology and recent negative stress test-resolved--recent PAF on anticoagulation  gastrointestinal: Negative for abdominal pain, blood in stool, constipation, diarrhea, nausea and vomiting.  Does have history of ulcerative colitis. -Quiescent at this point  Endocrine: Negative.    Genitourinary:  Negative for difficulty urinating, dysuria, frequency and hematuria.    No longer seeing urology  Musculoskeletal: Positive for arthralgias. negative for back pain,  and myalgias.       Skin: Negative.    Allergic/Immunologic: Negative for environmental allergies and immunocompromised state. Neurological: Negative for, weakness, light-headedness, numbness and headaches. Hematological: Negative.  Positive for dizziness/lightheadedness-improved  psychiatric/Behavioral:        Depression has improved.  She is on medication.  Living with her daughter that is working out well  Things have been a little bit more trying with recent COVID-19 pandemic status and her spending more time at home. -Improving           REST OF PERTINENT ROS GONE OVER AND WAS NEGATIVE.    PMH:  Health Maintenance:  Mammogram - (7/9/2018)  Mammogram Screening - (7/9/2018)  Influenza Vaccination - (9/20/2018)  Couseled on Home Safety - (2/26/2018)  Colonoscopy - (1/16/2018), 8/21-due 26  Colonoscopy Screening - (1/16/2018)  Bone Density Scan - (9/29/2016)  Pneumonia Vaccination - (9/22/2016)  Tetanus Immunization - (9/22/2016)  pt cannot recall if she had this vaccination, no record from pharmacy  Stress Test - 11/11,9/15, 7/21-negative  heart cath - 2/12-ok  Colonoscopy - 2/14,8/14,1/18-due 21  EKG - 3/14, 11/17  has Gyn - Dr. Suzanne Powers  2D ECHO - 9/15  Hemmocult Cards - 10/16-neg x 3  EGD - 1/18, 10/19,-due 22, 8/21-due 24  Prevnar Vaccine - (11/3/2015) [facility]  Medical Problems:  Hypertension, Restless Leg Syndrome, irritable bladder, Depression  Ulcerative Colitis - follows with dr Cherelle Walsh  Anemia - follows with dr Mae Tucker Polyps, over active bladder, vit D defic, chronic sinus congestion, Osteoarthritis, Diverticulosis, fx  right wrist, Pfo, Hyperlipidemia, Diastolic Dysfunction, Cholelithiasis, fracture right lateral malleolus,  Valvular Heart Disease, Lumbar DDD, Lumbar Spinal Stenosis, Lumbar herniated disc  Paroxysmal atrial fibrillation-8/21-anticoagulated with warfarin     Surgical Hx:  DEON - Non-cancerous  Left breast mass removal - x2-benign  Right carpal tunnel surgery, Rectocele repair, Left knee arthroscopy, Bilateral total hip arthroplasty,  Bladder suspension, Bunion surgery, Bilateral cataract surgery, Repair of torn meniscus left knee,  Posterior-lateral fusion L2-5    Right total knee arthroplasty-  Left total knee arthroplasty-   old records reviewed  FH:  Father:  Heart Disease -  age 80. Mother:  Non Insulin Dependent Diabetes -  age82  Congestive Heart Failure (CHF). Brother 1:  Obesity, Peripheral Vascular Disease (PVD). Maternal Grandmother:  Breast Cancer. SH:  Marital: Legal Status: . retired   Personal Habits: Cigarette Use: Does Not Smoke. Alcohol: Denies alcohol use                     Current Outpatient Medications:     pravastatin (PRAVACHOL) 20 MG tablet, Take 1 tablet by mouth daily, Disp: 90 tablet, Rfl: 1    potassium chloride (KLOR-CON M) 20 MEQ extended release tablet, Take 1 tablet by mouth daily, Disp: 90 tablet, Rfl: 1    hydroCHLOROthiazide (MICROZIDE) 12.5 MG capsule, Take 1 capsule by mouth daily, Disp: 90 capsule, Rfl: 1    escitalopram (LEXAPRO) 10 MG tablet, Take 1 tablet by mouth daily, Disp: 90 tablet, Rfl: 1    ciprofloxacin (CIPRO) 500 MG tablet, TAKE 1 TABLET BY MOUTH TWICE DAILY, Disp: , Rfl:     metroNIDAZOLE (FLAGYL) 500 MG tablet, TAKE 1 TABLET BY MOUTH EVERY 8 HOURS, Disp: , Rfl:     lisinopril (PRINIVIL;ZESTRIL) 10 MG tablet, Take 1 tablet by mouth daily, Disp: 90 tablet, Rfl: 1    metoprolol succinate (TOPROL XL) 50 MG extended release tablet, Take 1 tablet by mouth daily, Disp: 90 tablet, Rfl: 3    warfarin (COUMADIN) 5 MG tablet, Take 1 tablet by mouth daily, Disp: 90 tablet, Rfl: 1    nitroGLYCERIN (NITROSTAT) 0.4 MG SL tablet, Place 1 tablet under the tongue every 5 minutes as needed for Chest pain up to max of 3 total doses.  If no relief after 1 dose, call 911., Disp: 25 tablet, Rfl: 0    warfarin (COUMADIN) 1 MG tablet, 4 pills po qpm (Patient taking differently: 1 mg daily ), Disp: 60 tablet, Rfl: 3    Cyanocobalamin (B-12) 1000 MCG TABS, Take by mouth daily, Disp: , Rfl:     Fexofenadine-Pseudoephedrine (ALLEGRA-D 24 HOUR PO), Take by mouth, Disp: , Rfl:     MELATONIN ER PO, Take by mouth nightly, Disp: , Rfl:     Propylene Glycol-Glycerin (SOOTHE OP), Apply to eye, Disp: , Rfl:     vitamin D 25 MCG (1000 UT) CAPS, Take by mouth daily , Disp: , Rfl:     omeprazole (PRILOSEC) 20 MG delayed release capsule, Take 20 mg by mouth daily , Disp: , Rfl:     Biotin 35778 MCG TABS, Take by mouth daily , Disp: , Rfl:   Allergies   Allergen Reactions    Erythromycin Hives    Penicillin G     Azithromycin     Penicillins Hives    Propoxyphene        Past Medical History:   Diagnosis Date    Anemia     Cholelithiasis     Chronic congestion of paranasal sinus     Colon polyps     Depression     Diastolic dysfunction     Diverticulosis     HTN (hypertension)     Hyperlipidemia 06/20/2019    Irritable bladder     Lumbar degenerative disc disease     Lumbar herniated disc     Lumbar spinal stenosis     Major depressive disorder with single episode, in remission ([facility] [address] 06/20/2019    Osteoarthritis     Overactive bladder     RLS (restless legs syndrome) 06/20/2019    Ulcerative colitis ([facility] [address]     Ulcerative colitis without complications ([facility] [address] 61/97/0332    Valvular heart disease     Vitamin D deficiency     Wrist fracture     Right     Past Surgical History:   Procedure Laterality Date    BLADDER SUSPENSION  1978    BREAST LUMPECTOMY Left     x 2, negative    BUNIONECTOMY  1990    CARDIAC CATHETERIZATION  2/2012    CARPAL TUNNEL RELEASE Right     CATARACT REMOVAL WITH IMPLANT Right     COLONOSCOPY  2/2014    HYSTERECTOMY, TOTAL ABDOMINAL      non-cancerous    JOINT REPLACEMENT Bilateral     hip    KNEE ARTHROSCOPY Left     RECTOCELE REPAIR       Family History   Problem Relation Age of Onset    Heart Disease Father          age 80    Diabetes Mother          age 80    Heart Disease Mother         CHF    Other Brother         PVD, obesity    Cancer Paternal Grandmother         breast     Social History     Socioeconomic History    Marital status:      Spouse name: Not on file    Number of children: Not on file    Years of education: Not on file    Highest education level: Not on file   Occupational History    Not on file   Tobacco Use    Smoking status: Never Smoker    Smokeless tobacco: Never Used   Vaping Use    Vaping Use: Never used   Substance and Sexual Activity    Alcohol use: Yes     Comment: occasionally    Drug use: No    Sexual activity: Not on file   Other Topics Concern    Not on file   Social History Narrative    Not on file     Social Determinants of Health     Financial Resource Strain: Low Risk     Difficulty of Paying Living Expenses: Not hard at all   Food Insecurity: No Food Insecurity    Worried About [address] [employer] in the Last Year: Never true    [address] [facility] [employer] in the Last Year: Never true   Transportation Needs:     Lack of Transportation (Medical): Not on file    Lack of Transportation (Non-Medical):  Not on file   Physical Activity:     Days of Exercise per Week: Not on file    Minutes of Exercise per Session: Not on file   Stress:     Feeling of Stress : Not on file   Social Connections:     Frequency of Communication with Friends and Family: Not on file    Frequency of Social Gatherings with Friends and Family: Not on file    Attends Zoroastrianism Services: Not on file    Active Member of Clubs or Organizations: Not on file    Attends Club or Organization Meetings: Not on file    Marital Status: Not on file   Intimate Partner Violence:     Fear of Current or Ex-Partner: Not on file    Emotionally Abused: Not on file    Physically Abused: Not on file    Sexually Abused: Not on file Housing Stability:     Unable to Pay for Housing in the Last Year: Not on file    Number of Places Lived in the Last Year: Not on file    Unstable Housing in the Last Year: Not on file       Vitals:    11/22/21 1619   BP: 116/70   Pulse: 64   Temp: 97.1 °F (36.2 °C)   TempSrc: Temporal   SpO2: 100%   Weight: 153 lb (69.4 kg)   Height: 5' 5\" (1.651 m)       Physical Exam    Constitutional: She is oriented to person, place, and time. She appears well-developed and well-nourished. No distress.   Neck: Normal range of motion. Neck supple. No JVD present. No thyromegaly present. Cardiovascular: Normal rate, regular rhythm, normal heart sounds and intact distal pulses. Exam reveals no gallop and no friction rub.   No murmur heard. Pulmonary/Chest: Effort normal and breath sounds normal. No respiratory distress. She has no wheezes. She has no rales. Abdominal: Soft. Bowel sounds are normal. She exhibits no distension and no mass. There is no tenderness. Musculoskeletal: Normal range of motion. She exhibits no edema.      Neurological: She is alert and oriented to person, place, and time. She displays normal reflexes. No sensory deficit. She exhibits normal muscle tone. Coordination normal.   Skin: Skin is warm and dry. No rash noted. No erythema. Psychiatric: She has a normal mood and affect. Her behavior is normal.   Nursing note and vitals reviewed        Assessment and Plan:  Sergio Stevens was seen today for office visit for anticoagulation management. Diagnoses and all orders for this visit:    Hyperlipidemia, unspecified hyperlipidemia type  -     pravastatin (PRAVACHOL) 20 MG tablet; Take 1 tablet by mouth daily  Stable on statin medication    Essential hypertension  -     potassium chloride (KLOR-CON M) 20 MEQ extended release tablet; Take 1 tablet by mouth daily  -     hydroCHLOROthiazide (MICROZIDE) 12.5 MG capsule;  Take 1 capsule by mouth daily  -     CBC Auto Differential; Future  -     Comprehensive Metabolic Panel; Future  Stable on current antihypertensive regimen    Major depressive disorder with single episode, in remission ([facility] [address]  -     escitalopram (LEXAPRO) 10 MG tablet; Take 1 tablet by mouth daily  Stable on SSRI    Anticoagulated on Coumadin  -     POCT INR    Other orders  -     INFLUENZA, QUADV, ADJUVANTED, 65 YRS =, IM, PF, PREFILL SYR, 0.5ML (FLUAD)    Plan: I will see her back in 2 weeks to check INR. Adjustment made in her Coumadin dose-see above. Prescription management performed. Blood work placed for her 2-week INR follow-up. Next regular visit 3 months. Fall precautions. Follow-up with above consultants. Notify us of problems in the interim. Return in about 2 weeks (around 12/6/2021) for 2 week inr,3 month fu. Seen By:  Ruthie Hay MD      *Document was created using voice recognition software. Note was reviewed however may contain grammatical errors.

## 2021-12-06 ENCOUNTER — OFFICE VISIT (OUTPATIENT)
Dept: FAMILY MEDICINE CLINIC | Age: 76
End: 2021-12-06
Payer: MEDICARE

## 2021-12-06 ENCOUNTER — TELEPHONE (OUTPATIENT)
Dept: FAMILY MEDICINE CLINIC | Age: 76
End: 2021-12-06

## 2021-12-06 VITALS
OXYGEN SATURATION: 100 % | WEIGHT: 157 LBS | DIASTOLIC BLOOD PRESSURE: 70 MMHG | HEIGHT: 65 IN | HEART RATE: 54 BPM | BODY MASS INDEX: 26.16 KG/M2 | SYSTOLIC BLOOD PRESSURE: 128 MMHG | TEMPERATURE: 97.5 F

## 2021-12-06 DIAGNOSIS — Z79.01 ANTICOAGULATED ON COUMADIN: Primary | ICD-10-CM

## 2021-12-06 DIAGNOSIS — I10 ESSENTIAL HYPERTENSION: ICD-10-CM

## 2021-12-06 DIAGNOSIS — I48.0 PAF (PAROXYSMAL ATRIAL FIBRILLATION) (HCC): ICD-10-CM

## 2021-12-06 LAB
ALBUMIN SERPL-MCNC: 4.7 G/DL (ref 3.5–5.2)
ALP BLD-CCNC: 77 U/L (ref 35–104)
ALT SERPL-CCNC: 9 U/L (ref 0–32)
ANION GAP SERPL CALCULATED.3IONS-SCNC: 9 MMOL/L (ref 7–16)
AST SERPL-CCNC: 19 U/L (ref 0–31)
BASOPHILS ABSOLUTE: 0.02 E9/L (ref 0–0.2)
BASOPHILS RELATIVE PERCENT: 0.4 % (ref 0–2)
BILIRUB SERPL-MCNC: 0.2 MG/DL (ref 0–1.2)
BUN BLDV-MCNC: 23 MG/DL (ref 6–23)
CALCIUM SERPL-MCNC: 9.5 MG/DL (ref 8.6–10.2)
CHLORIDE BLD-SCNC: 102 MMOL/L (ref 98–107)
CO2: 26 MMOL/L (ref 22–29)
CREAT SERPL-MCNC: 1 MG/DL (ref 0.5–1)
EOSINOPHILS ABSOLUTE: 0.06 E9/L (ref 0.05–0.5)
EOSINOPHILS RELATIVE PERCENT: 1.3 % (ref 0–6)
GFR AFRICAN AMERICAN: >60
GFR NON-AFRICAN AMERICAN: 54 ML/MIN/1.73
GLUCOSE BLD-MCNC: 89 MG/DL (ref 74–99)
HCT VFR BLD CALC: 34.9 % (ref 34–48)
HEMOGLOBIN: 11.3 G/DL (ref 11.5–15.5)
IMMATURE GRANULOCYTES #: 0.02 E9/L
IMMATURE GRANULOCYTES %: 0.4 % (ref 0–5)
INTERNATIONAL NORMALIZATION RATIO, POC: 2.3
LYMPHOCYTES ABSOLUTE: 1.96 E9/L (ref 1.5–4)
LYMPHOCYTES RELATIVE PERCENT: 43.2 % (ref 20–42)
MCH RBC QN AUTO: 28.6 PG (ref 26–35)
MCHC RBC AUTO-ENTMCNC: 32.4 % (ref 32–34.5)
MCV RBC AUTO: 88.4 FL (ref 80–99.9)
MONOCYTES ABSOLUTE: 0.53 E9/L (ref 0.1–0.95)
MONOCYTES RELATIVE PERCENT: 11.7 % (ref 2–12)
NEUTROPHILS ABSOLUTE: 1.95 E9/L (ref 1.8–7.3)
NEUTROPHILS RELATIVE PERCENT: 43 % (ref 43–80)
PDW BLD-RTO: 14.2 FL (ref 11.5–15)
PLATELET # BLD: 161 E9/L (ref 130–450)
PMV BLD AUTO: 11.6 FL (ref 7–12)
POTASSIUM SERPL-SCNC: 5 MMOL/L (ref 3.5–5)
PROTHROMBIN TIME, POC: 27.3
RBC # BLD: 3.95 E12/L (ref 3.5–5.5)
SODIUM BLD-SCNC: 137 MMOL/L (ref 132–146)
TOTAL PROTEIN: 6.9 G/DL (ref 6.4–8.3)
WBC # BLD: 4.5 E9/L (ref 4.5–11.5)

## 2021-12-06 PROCEDURE — G8417 CALC BMI ABV UP PARAM F/U: HCPCS | Performed by: INTERNAL MEDICINE

## 2021-12-06 PROCEDURE — G8400 PT W/DXA NO RESULTS DOC: HCPCS | Performed by: INTERNAL MEDICINE

## 2021-12-06 PROCEDURE — 4040F PNEUMOC VAC/ADMIN/RCVD: CPT | Performed by: INTERNAL MEDICINE

## 2021-12-06 PROCEDURE — G8427 DOCREV CUR MEDS BY ELIG CLIN: HCPCS | Performed by: INTERNAL MEDICINE

## 2021-12-06 PROCEDURE — 99213 OFFICE O/P EST LOW 20 MIN: CPT | Performed by: INTERNAL MEDICINE

## 2021-12-06 PROCEDURE — 1036F TOBACCO NON-USER: CPT | Performed by: INTERNAL MEDICINE

## 2021-12-06 PROCEDURE — G8484 FLU IMMUNIZE NO ADMIN: HCPCS | Performed by: INTERNAL MEDICINE

## 2021-12-06 PROCEDURE — 1090F PRES/ABSN URINE INCON ASSESS: CPT | Performed by: INTERNAL MEDICINE

## 2021-12-06 PROCEDURE — 85610 PROTHROMBIN TIME: CPT | Performed by: INTERNAL MEDICINE

## 2021-12-06 PROCEDURE — 1123F ACP DISCUSS/DSCN MKR DOCD: CPT | Performed by: INTERNAL MEDICINE

## 2021-12-06 NOTE — PROGRESS NOTES
[facility]     21  Lito Rico : 1945 Sex: female  Age: 67 y.o. Chief Complaint   Patient presents with    Office Visit for Anticoagulation Management     2 wk INR       HPI  Patient presents today accompanied by daughter to have INR check/anticoagulation management/follow-up. Last INR was subtherapeutic adjustment was made today's numbers good at 2.3. No bleeding or thrombotic episodes. She is tolerating anticoagulant well. Blood pressure is good. Review of Systems   Constitutional: Negative for activity change, appetite change, chills, diaphoresis, , fever and unexpected weight change.    HENT: Negative  Respiratory: Negative for cough,  and wheezing.   Positive for dyspnea on exertion-improved  Cardiovascular: Negative for palpitations and leg swelling.  Did have chest tightness-seeing cardiology and recent negative stress test-resolved--recent PAF on anticoagulation  gastrointestinal: Negative for abdominal pain, blood in stool, constipation, diarrhea, nausea and vomiting.  Does have history of ulcerative colitis. -Quiescent at this point  Endocrine: Negative.    Genitourinary:  Negative for difficulty urinating, dysuria, frequency and hematuria.    No longer seeing urology  Musculoskeletal: Positive for arthralgias. negative for back pain,  and myalgias.       Skin: Negative.    Allergic/Immunologic: Negative for environmental allergies and immunocompromised state. Neurological: Negative for, weakness, light-headedness, numbness and headaches. Hematological: Negative.  Positive for dizziness/lightheadedness-improved  psychiatric/Behavioral:        Depression has improved.  She is on medication.  Living with her daughter -Improving             REST OF PERTINENT ROS GONE OVER AND WAS NEGATIVE.                  Current Outpatient Medications:     pravastatin (PRAVACHOL) 20 MG tablet, Take 1 tablet by mouth daily, Disp: 90 tablet, Rfl: 1    potassium chloride (KLOR-CON M) 20 MEQ extended release tablet, Take 1 tablet by mouth daily, Disp: 90 tablet, Rfl: 1    hydroCHLOROthiazide (MICROZIDE) 12.5 MG capsule, Take 1 capsule by mouth daily, Disp: 90 capsule, Rfl: 1    escitalopram (LEXAPRO) 10 MG tablet, Take 1 tablet by mouth daily, Disp: 90 tablet, Rfl: 1    ciprofloxacin (CIPRO) 500 MG tablet, TAKE 1 TABLET BY MOUTH TWICE DAILY, Disp: , Rfl:     metroNIDAZOLE (FLAGYL) 500 MG tablet, TAKE 1 TABLET BY MOUTH EVERY 8 HOURS, Disp: , Rfl:     lisinopril (PRINIVIL;ZESTRIL) 10 MG tablet, Take 1 tablet by mouth daily, Disp: 90 tablet, Rfl: 1    metoprolol succinate (TOPROL XL) 50 MG extended release tablet, Take 1 tablet by mouth daily, Disp: 90 tablet, Rfl: 3    warfarin (COUMADIN) 5 MG tablet, Take 1 tablet by mouth daily, Disp: 90 tablet, Rfl: 1    nitroGLYCERIN (NITROSTAT) 0.4 MG SL tablet, Place 1 tablet under the tongue every 5 minutes as needed for Chest pain up to max of 3 total doses.  If no relief after 1 dose, call 911., Disp: 25 tablet, Rfl: 0    warfarin (COUMADIN) 1 MG tablet, 4 pills po qpm (Patient taking differently: 1 mg daily ), Disp: 60 tablet, Rfl: 3    Cyanocobalamin (B-12) 1000 MCG TABS, Take by mouth daily, Disp: , Rfl:     Fexofenadine-Pseudoephedrine (ALLEGRA-D 24 HOUR PO), Take by mouth, Disp: , Rfl:     MELATONIN ER PO, Take by mouth nightly, Disp: , Rfl:     Propylene Glycol-Glycerin (SOOTHE OP), Apply to eye, Disp: , Rfl:     vitamin D 25 MCG (1000 UT) CAPS, Take by mouth daily , Disp: , Rfl:     omeprazole (PRILOSEC) 20 MG delayed release capsule, Take 20 mg by mouth daily , Disp: , Rfl:     Biotin 11002 MCG TABS, Take by mouth daily , Disp: , Rfl:   Allergies   Allergen Reactions    Erythromycin Hives    Penicillin G     Azithromycin     Penicillins Hives    Propoxyphene        Past Medical History:   Diagnosis Date    Anemia     Cholelithiasis     Chronic congestion of paranasal sinus     Colon polyps     Depression     contain grammatical errors.

## 2021-12-08 NOTE — TELEPHONE ENCOUNTER
Called and spoke with the patients daughter YAMILETH [facility]. Let her know that patients labs came back okay. I also scheduled her for her 1 month INR.

## 2022-01-18 ENCOUNTER — TELEPHONE (OUTPATIENT)
Dept: FAMILY MEDICINE CLINIC | Age: 77
End: 2022-01-18

## 2022-01-18 NOTE — TELEPHONE ENCOUNTER
Roger Cabrera has an appointment scheduled for tomorrow morning but would like to cancel it. Dr. Mortimer Eans does not have any openings available. Where would you like for me to reschedule?

## 2022-01-18 NOTE — TELEPHONE ENCOUNTER
----- Message from Coreen Ken sent at 1/18/2022  9:56 AM EST -----  Subject: Appointment Request    Reason for Call: Routine Existing Condition Follow Up    QUESTIONS  Type of Appointment? Established Patient  Reason for appointment request? Available appointments did not meet   patient need  Additional Information for Provider? pt needs to come in for her INR   follow up visit but none were available . Pt would like a call back and   would also like an appt that better suits her needs  ---------------------------------------------------------------------------  --------------  CALL BACK INFO  What is the best way for the office to contact you? OK to leave message on   voicemail  Preferred Call Back Phone Number? [phone]  ---------------------------------------------------------------------------  --------------  SCRIPT ANSWERS  Relationship to Patient? Self  Have your symptoms changed? No  Is this follow up request related to routine Diabetes Management? No  Have you been diagnosed with, awaiting test results for, or told that you   are suspected of having COVID-19 (Coronavirus)? (If patient has tested   negative or was tested as a requirement for work, school, or travel and   not based on symptoms, answer no)? No  Within the past two weeks have you developed any of the following symptoms   (answer no if symptoms have been present longer than 2 weeks or began   more than 2 weeks ago)? Fever or Chills, Cough, Shortness of breath or   difficulty breathing, Loss of taste or smell, Sore throat, Nasal   congestion, Sneezing or runny nose, Fatigue or generalized body aches   (answer no if pain is specific to a body part e.g. back pain), Diarrhea,   Headache? No  Have you had close contact with someone with COVID-19 in the last 14 days? No  (Service Expert  click yes below to proceed with Needcheck As Usual   Scheduling)?  Yes

## 2022-01-29 ENCOUNTER — OFFICE VISIT (OUTPATIENT)
Dept: FAMILY MEDICINE CLINIC | Age: 77
End: 2022-01-29
Payer: MEDICARE

## 2022-01-29 VITALS
BODY MASS INDEX: 26.42 KG/M2 | HEART RATE: 64 BPM | SYSTOLIC BLOOD PRESSURE: 124 MMHG | TEMPERATURE: 98.1 F | HEIGHT: 65 IN | DIASTOLIC BLOOD PRESSURE: 62 MMHG | WEIGHT: 158.6 LBS | OXYGEN SATURATION: 100 %

## 2022-01-29 DIAGNOSIS — I10 ESSENTIAL HYPERTENSION: ICD-10-CM

## 2022-01-29 DIAGNOSIS — I48.0 PAF (PAROXYSMAL ATRIAL FIBRILLATION) (HCC): ICD-10-CM

## 2022-01-29 DIAGNOSIS — Z79.01 ANTICOAGULATED ON COUMADIN: ICD-10-CM

## 2022-01-29 LAB
INTERNATIONAL NORMALIZATION RATIO, POC: 3.2
PROTHROMBIN TIME, POC: 37.8

## 2022-01-29 PROCEDURE — 1036F TOBACCO NON-USER: CPT | Performed by: INTERNAL MEDICINE

## 2022-01-29 PROCEDURE — G8427 DOCREV CUR MEDS BY ELIG CLIN: HCPCS | Performed by: INTERNAL MEDICINE

## 2022-01-29 PROCEDURE — 4040F PNEUMOC VAC/ADMIN/RCVD: CPT | Performed by: INTERNAL MEDICINE

## 2022-01-29 PROCEDURE — G8400 PT W/DXA NO RESULTS DOC: HCPCS | Performed by: INTERNAL MEDICINE

## 2022-01-29 PROCEDURE — 1090F PRES/ABSN URINE INCON ASSESS: CPT | Performed by: INTERNAL MEDICINE

## 2022-01-29 PROCEDURE — G8484 FLU IMMUNIZE NO ADMIN: HCPCS | Performed by: INTERNAL MEDICINE

## 2022-01-29 PROCEDURE — 99213 OFFICE O/P EST LOW 20 MIN: CPT | Performed by: INTERNAL MEDICINE

## 2022-01-29 PROCEDURE — 85610 PROTHROMBIN TIME: CPT | Performed by: INTERNAL MEDICINE

## 2022-01-29 PROCEDURE — G8417 CALC BMI ABV UP PARAM F/U: HCPCS | Performed by: INTERNAL MEDICINE

## 2022-01-29 PROCEDURE — 1123F ACP DISCUSS/DSCN MKR DOCD: CPT | Performed by: INTERNAL MEDICINE

## 2022-01-29 NOTE — PROGRESS NOTES
[address] [place]     22  Amauri Zaman : 1945 Sex: female  Age: 67 y.o. Chief Complaint   Patient presents with    Office Visit for Anticoagulation Management     INR check    Hyperlipidemia       HPI  Patient presents today accompanied by daughter to have INR check/anticoagulation management/follow-up. Patient's INR today was 3.2. She is taking 6 mg of Coumadin daily. Said no bleeding or thrombotic episodes. She does admit to 2 falls since have seen her last states she bruised himself up pretty good but did not get hurt otherwise. Blood pressures been good. Depression has been stable. Review of Systems     Constitutional: Negative for activity change, appetite change, chills, diaphoresis, , fever and unexpected weight change.    HENT: Negative  Respiratory: Negative for cough,  and wheezing.   Positive for dyspnea on exertion-improved  Cardiovascular: Negative for palpitations and leg swelling.  Did have chest tightness-seeing cardiology and recent negative stress test-resolved--recent PAF on anticoagulation  gastrointestinal: Negative for abdominal pain, blood in stool, constipation, diarrhea, nausea and vomiting.  Does have history of ulcerative colitis. -Quiescent at this point  Endocrine: Negative.    Genitourinary:  Negative for difficulty urinating, dysuria, frequency and hematuria.    No longer seeing urology  Musculoskeletal: Positive for arthralgias. negative for back pain,  and myalgias.       Skin: Negative.    Allergic/Immunologic: Negative for environmental allergies and immunocompromised state. Neurological: Negative for, weakness, light-headedness, numbness and headaches. Hematological: Negative.  Positive for dizziness/lightheadedness-improved  psychiatric/Behavioral:        Depression has improved.  She is on medication.  Living with her daughter -Improving        REST OF PERTINENT ROS GONE OVER AND WAS NEGATIVE.                  Current Outpatient Medications:     pravastatin (PRAVACHOL) 20 MG tablet, Take 1 tablet by mouth daily, Disp: 90 tablet, Rfl: 1    potassium chloride (KLOR-CON M) 20 MEQ extended release tablet, Take 1 tablet by mouth daily, Disp: 90 tablet, Rfl: 1    hydroCHLOROthiazide (MICROZIDE) 12.5 MG capsule, Take 1 capsule by mouth daily, Disp: 90 capsule, Rfl: 1    escitalopram (LEXAPRO) 10 MG tablet, Take 1 tablet by mouth daily, Disp: 90 tablet, Rfl: 1    ciprofloxacin (CIPRO) 500 MG tablet, TAKE 1 TABLET BY MOUTH TWICE DAILY, Disp: , Rfl:     metroNIDAZOLE (FLAGYL) 500 MG tablet, TAKE 1 TABLET BY MOUTH EVERY 8 HOURS, Disp: , Rfl:     lisinopril (PRINIVIL;ZESTRIL) 10 MG tablet, Take 1 tablet by mouth daily, Disp: 90 tablet, Rfl: 1    metoprolol succinate (TOPROL XL) 50 MG extended release tablet, Take 1 tablet by mouth daily, Disp: 90 tablet, Rfl: 3    warfarin (COUMADIN) 5 MG tablet, Take 1 tablet by mouth daily, Disp: 90 tablet, Rfl: 1    nitroGLYCERIN (NITROSTAT) 0.4 MG SL tablet, Place 1 tablet under the tongue every 5 minutes as needed for Chest pain up to max of 3 total doses.  If no relief after 1 dose, call 911., Disp: 25 tablet, Rfl: 0    warfarin (COUMADIN) 1 MG tablet, 4 pills po qpm (Patient taking differently: 1 mg daily ), Disp: 60 tablet, Rfl: 3    Cyanocobalamin (B-12) 1000 MCG TABS, Take by mouth daily, Disp: , Rfl:     Fexofenadine-Pseudoephedrine (ALLEGRA-D 24 HOUR PO), Take by mouth, Disp: , Rfl:     MELATONIN ER PO, Take by mouth nightly, Disp: , Rfl:     Propylene Glycol-Glycerin (SOOTHE OP), Apply to eye, Disp: , Rfl:     vitamin D 25 MCG (1000 UT) CAPS, Take by mouth daily , Disp: , Rfl:     omeprazole (PRILOSEC) 20 MG delayed release capsule, Take 20 mg by mouth daily , Disp: , Rfl:     Biotin 05011 MCG TABS, Take by mouth daily , Disp: , Rfl:   Allergies   Allergen Reactions    Erythromycin Hives    Penicillin G     Azithromycin     Penicillins Hives    Propoxyphene        Past Medical Narrative    Not on file     Social Determinants of Health     Financial Resource Strain: Low Risk     Difficulty of Paying Living Expenses: Not hard at all   Food Insecurity: No Food Insecurity    Worried About Running Out of Food in the Last Year: Never true    [address] in the Last Year: Never true   Transportation Needs:     Lack of Transportation (Medical): Not on file    Lack of Transportation (Non-Medical): Not on file   Physical Activity:     Days of Exercise per Week: Not on file    Minutes of Exercise per Session: Not on file   Stress:     Feeling of Stress : Not on file   Social Connections:     Frequency of Communication with Friends and Family: Not on file    Frequency of Social Gatherings with Friends and Family: Not on file    Attends Denominational Services: Not on file    Active Member of [address] [place] [address] [employer] or Organizations: Not on file    Attends Club or Organization Meetings: Not on file    Marital Status: Not on file   Intimate Partner Violence:     Fear of Current or Ex-Partner: Not on file    Emotionally Abused: Not on file    Physically Abused: Not on file    Sexually Abused: Not on file   Housing Stability:     Unable to Pay for Housing in the Last Year: Not on file    Number of [place] in the Last Year: Not on file    Unstable Housing in the Last Year: Not on file       Vitals:    01/29/22 0803   BP: 124/62   Pulse: 64   Temp: 98.1 °F (36.7 °C)   TempSrc: Temporal   SpO2: 100%   Weight: 158 lb 9.6 oz (71.9 kg)   Height: 5' 5\" (1.651 m)       Physical Exam      No physical exam performed today. Assessment and Plan:  Timothy Lal was seen today for office visit for anticoagulation management and hyperlipidemia. Diagnoses and all orders for this visit:    Anticoagulated on Coumadin  -     POCT INR    PAF (paroxysmal atrial fibrillation) ([facility] [address]    Essential hypertension    Plan: Hold Coumadin today then go to 6 mg Monday through Saturday and 3 mg Sunday.   We will recheck INR at upcoming regular appointment visit on the 23rd of next month. Look over last regular visit note. Fall precautions. Notify us of problems in the interim. Return for Keep appointment. Seen By:  Erick Santamaria MD      *Document was created using voice recognition software. Note was reviewed however may contain grammatical errors.

## 2022-02-23 ENCOUNTER — OFFICE VISIT (OUTPATIENT)
Dept: FAMILY MEDICINE CLINIC | Age: 77
End: 2022-02-23
Payer: MEDICARE

## 2022-02-23 VITALS
RESPIRATION RATE: 18 BRPM | DIASTOLIC BLOOD PRESSURE: 68 MMHG | TEMPERATURE: 97.8 F | BODY MASS INDEX: 26.69 KG/M2 | SYSTOLIC BLOOD PRESSURE: 122 MMHG | HEART RATE: 54 BPM | WEIGHT: 160.2 LBS | HEIGHT: 65 IN | OXYGEN SATURATION: 96 %

## 2022-02-23 DIAGNOSIS — M54.2 NECK PAIN: ICD-10-CM

## 2022-02-23 DIAGNOSIS — I10 ESSENTIAL HYPERTENSION: ICD-10-CM

## 2022-02-23 DIAGNOSIS — E78.5 HYPERLIPIDEMIA, UNSPECIFIED HYPERLIPIDEMIA TYPE: ICD-10-CM

## 2022-02-23 DIAGNOSIS — F32.5 MAJOR DEPRESSIVE DISORDER WITH SINGLE EPISODE, IN REMISSION (HCC): ICD-10-CM

## 2022-02-23 DIAGNOSIS — Z79.01 ANTICOAGULATED: ICD-10-CM

## 2022-02-23 DIAGNOSIS — M54.6 THORACIC BACK PAIN, UNSPECIFIED BACK PAIN LATERALITY, UNSPECIFIED CHRONICITY: ICD-10-CM

## 2022-02-23 DIAGNOSIS — K51.90 ULCERATIVE COLITIS WITHOUT COMPLICATIONS, UNSPECIFIED LOCATION (HCC): ICD-10-CM

## 2022-02-23 LAB
INTERNATIONAL NORMALIZATION RATIO, POC: 2.6
PROTHROMBIN TIME, POC: 30.8

## 2022-02-23 PROCEDURE — 1090F PRES/ABSN URINE INCON ASSESS: CPT | Performed by: INTERNAL MEDICINE

## 2022-02-23 PROCEDURE — G8484 FLU IMMUNIZE NO ADMIN: HCPCS | Performed by: INTERNAL MEDICINE

## 2022-02-23 PROCEDURE — 99214 OFFICE O/P EST MOD 30 MIN: CPT | Performed by: INTERNAL MEDICINE

## 2022-02-23 PROCEDURE — 1123F ACP DISCUSS/DSCN MKR DOCD: CPT | Performed by: INTERNAL MEDICINE

## 2022-02-23 PROCEDURE — 4040F PNEUMOC VAC/ADMIN/RCVD: CPT | Performed by: INTERNAL MEDICINE

## 2022-02-23 PROCEDURE — 85610 PROTHROMBIN TIME: CPT | Performed by: INTERNAL MEDICINE

## 2022-02-23 PROCEDURE — 1036F TOBACCO NON-USER: CPT | Performed by: INTERNAL MEDICINE

## 2022-02-23 PROCEDURE — G8400 PT W/DXA NO RESULTS DOC: HCPCS | Performed by: INTERNAL MEDICINE

## 2022-02-23 PROCEDURE — G8417 CALC BMI ABV UP PARAM F/U: HCPCS | Performed by: INTERNAL MEDICINE

## 2022-02-23 PROCEDURE — G8427 DOCREV CUR MEDS BY ELIG CLIN: HCPCS | Performed by: INTERNAL MEDICINE

## 2022-02-23 RX ORDER — POTASSIUM CHLORIDE 20 MEQ/1
20 TABLET, EXTENDED RELEASE ORAL DAILY
Qty: 90 TABLET | Refills: 1 | Status: CANCELLED | OUTPATIENT
Start: 2022-02-23

## 2022-02-23 RX ORDER — WARFARIN SODIUM 5 MG/1
5 TABLET ORAL DAILY
Qty: 90 TABLET | Refills: 1 | Status: SHIPPED
Start: 2022-02-23 | End: 2022-06-23 | Stop reason: SDUPTHER

## 2022-02-23 RX ORDER — LISINOPRIL 10 MG/1
10 TABLET ORAL DAILY
Qty: 90 TABLET | Refills: 1 | Status: SHIPPED
Start: 2022-02-23 | End: 2022-03-30 | Stop reason: DRUGHIGH

## 2022-02-23 RX ORDER — HYDROCHLOROTHIAZIDE 12.5 MG/1
12.5 CAPSULE, GELATIN COATED ORAL DAILY
Qty: 90 CAPSULE | Refills: 1 | Status: CANCELLED | OUTPATIENT
Start: 2022-02-23

## 2022-02-23 RX ORDER — PRAVASTATIN SODIUM 20 MG
20 TABLET ORAL DAILY
Qty: 90 TABLET | Refills: 1 | Status: SHIPPED
Start: 2022-02-23 | End: 2022-06-23 | Stop reason: SDUPTHER

## 2022-02-23 RX ORDER — ESCITALOPRAM OXALATE 10 MG/1
10 TABLET ORAL DAILY
Qty: 90 TABLET | Refills: 1 | Status: SHIPPED
Start: 2022-02-23 | End: 2022-06-23 | Stop reason: SDUPTHER

## 2022-02-24 NOTE — PROGRESS NOTES
[address] [facility]     22  Amauri Zaman : 1945 Sex: female  Age: 67 y.o. Chief Complaint   Patient presents with    Hypertension    Hyperlipidemia    Other     valvular heart disease       HPI    Patient presents today for 3-month follow-up visit on her medical problems. She is also for 1 month INR checked/anticoagulation management. She is accompanied by her daughter today. I reviewed last several notes. Her INR today was 2.6 and therapeutic on her current dose of Coumadin which is 6 mg Monday through Saturday and 3 mg . I asked her to continue this and recheck again in about a month. Distant pleasure status. Brings numbers todaylook into the 90s. She states she does get somewhat symptomatic with dizziness and weakness when this occurs. She currently on lisinopril and necrotized. I did ask her at this point to hold the hydrochlorothiazide and her potassium continue with senna. Ask to monitor pressures closely. Development states that medication. Depression has been stable on her SSRI. Has been stable on PPI. She is also complaining of some mid back and neck comfort.'s been going on for the last year he states is good most recent she has had low back surgery in the past.  She follows with GI, orthopedics and now cardiology     Review of Systems     Constitutional: Negative for activity change, appetite change, chills, diaphoresis, , fever and unexpected weight change.    HENT: Negative  Respiratory: Negative for cough,  and wheezing.   Positive for dyspnea on exertion-improved  Cardiovascular: Negative for palpitations and leg swelling.  Did have chest tightness-seeing cardiology and recent negative stress test-resolved--recent PAF on anticoagulation  gastrointestinal: Negative for abdominal pain, blood in stool, constipation, diarrhea, nausea and vomiting.  Does have history of ulcerative colitis. -Quiescent at this point  Endocrine: Negative.    Genitourinary:  Negative for difficulty urinating, dysuria, frequency and hematuria.    No longer seeing urology  Musculoskeletal: Positive for arthralgias. negative for back pain,  and myalgias.       Skin: Negative.    Allergic/Immunologic: Negative for environmental allergies and immunocompromised state. Neurological: Negative for, weakness, light-headedness, numbness and headaches. Hematological: Negative.  Positive for dizziness/lightheadedness-primarily when blood pressure is too low   psychiatric/Behavioral:        Depression has improved ors her      REST OF PERTINENT ROS GONE OVER AND WAS NEGATIVE.      PMH:  Health Maintenance:  Mammogram - (7/9/2018)  Mammogram Screening - (7/9/2018)  Influenza Vaccination - (9/20/2018)  Couseled on Home Safety - (2/26/2018)  Colonoscopy - (1/16/2018), 8/21-due 26  Colonoscopy Screening - (1/16/2018)  Bone Density Scan - (9/29/2016)  Pneumonia Vaccination - (9/22/2016)  Tetanus Immunization - (9/22/2016)  pt cannot recall if she had this vaccination, no record from pharmacy  Stress Test - 11/11,9/15, 7/21-negative  heart cath - 2/12-ok  Colonoscopy - 2/14,8/14,1/18-due 21  EKG - 3/14, 11/17  has Gyn - Dr. Cristi Pro  2D ECHO - 9/15  Hemmocult Cards - 10/16-neg x 3  EGD - 1/18, 10/19,-due 22, 8/21-due 24  Prevnar Vaccine - (11/3/2015) [facility]  Medical Problems:  Hypertension, Restless Leg Syndrome, irritable bladder, Depression  Ulcerative Colitis - follows with dr Ileana Rivera  Anemia - follows with dr Cintia Stafford Polyps, over active bladder, vit D defic, chronic sinus congestion, Osteoarthritis, Diverticulosis, fx  right wrist, Pfo, Hyperlipidemia, Diastolic Dysfunction, Cholelithiasis, fracture right lateral malleolus,  Valvular Heart Disease, Lumbar DDD, Lumbar Spinal Stenosis, Lumbar herniated disc  Paroxysmal atrial fibrillation-8/21-anticoagulated with warfarin     Surgical Hx:  DEON - Non-cancerous  Left breast mass removal - x2-benign  Right carpal tunnel surgery, Rectocele repair, Left knee arthroscopy, Bilateral total hip arthroplasty,  Bladder suspension, Bunion surgery, Bilateral cataract surgery, Repair of torn meniscus left knee,  Posterior-lateral fusion L2-5    Right total knee arthroplasty-  Left total knee arthroplasty-   old records reviewed  FH:  Father:  Heart Disease -  age 80. Mother:  Non Insulin Dependent Diabetes -  age84  Congestive Heart Failure (CHF). Brother 1:  Obesity, Peripheral Vascular Disease (PVD). Maternal Grandmother:  Breast Cancer. SH:  Marital: Legal Status: . retired   Personal Habits: Cigarette Use: Does Not Smoke. Alcohol: Denies alcohol use                Current Outpatient Medications:     escitalopram (LEXAPRO) 10 MG tablet, Take 1 tablet by mouth daily, Disp: 90 tablet, Rfl: 1    lisinopril (PRINIVIL;ZESTRIL) 10 MG tablet, Take 1 tablet by mouth daily, Disp: 90 tablet, Rfl: 1    pravastatin (PRAVACHOL) 20 MG tablet, Take 1 tablet by mouth daily, Disp: 90 tablet, Rfl: 1    warfarin (COUMADIN) 5 MG tablet, Take 1 tablet by mouth daily, Disp: 90 tablet, Rfl: 1    warfarin (COUMADIN) 1 MG tablet, Take 1 tablet by mouth daily, Disp: 90 tablet, Rfl: 1    potassium chloride (KLOR-CON M) 20 MEQ extended release tablet, Take 1 tablet by mouth daily, Disp: 90 tablet, Rfl: 1    hydroCHLOROthiazide (MICROZIDE) 12.5 MG capsule, Take 1 capsule by mouth daily, Disp: 90 capsule, Rfl: 1    metoprolol succinate (TOPROL XL) 50 MG extended release tablet, Take 1 tablet by mouth daily, Disp: 90 tablet, Rfl: 3    nitroGLYCERIN (NITROSTAT) 0.4 MG SL tablet, Place 1 tablet under the tongue every 5 minutes as needed for Chest pain up to max of 3 total doses.  If no relief after 1 dose, call 911., Disp: 25 tablet, Rfl: 0    Cyanocobalamin (B-12) 1000 MCG TABS, Take by mouth daily, Disp: , Rfl:     MELATONIN ER PO, Take by mouth nightly, Disp: , Rfl:     Propylene Glycol-Glycerin (SOOTHE OP), Apply to eye, Disp: , Rfl:     vitamin D 25 MCG (1000 UT) CAPS, Take by mouth daily , Disp: , Rfl:     omeprazole (PRILOSEC) 20 MG delayed release capsule, Take 20 mg by mouth daily , Disp: , Rfl:     Biotin 51016 MCG TABS, Take by mouth daily , Disp: , Rfl:     Fexofenadine-Pseudoephedrine (ALLEGRA-D 24 HOUR PO), Take by mouth (Patient not taking: Reported on 2022), Disp: , Rfl:   Allergies   Allergen Reactions    Erythromycin Hives    Penicillin G     Azithromycin     Penicillins Hives    Propoxyphene        Past Medical History:   Diagnosis Date    Anemia     Cholelithiasis     Chronic congestion of paranasal sinus     Colon polyps     Depression     Diastolic dysfunction     Diverticulosis     HTN (hypertension)     Hyperlipidemia 2019    Irritable bladder     Lumbar degenerative disc disease     Lumbar herniated disc     Lumbar spinal stenosis     Major depressive disorder with single episode, in remission ([facility] [address] 2019    Osteoarthritis     Overactive bladder     RLS (restless legs syndrome) 2019    Ulcerative colitis ([facility] [address]     Ulcerative colitis without complications ([facility] [address]     Valvular heart disease     Vitamin D deficiency     Wrist fracture     Right     Past Surgical History:   Procedure Laterality Date    BLADDER SUSPENSION      BREAST LUMPECTOMY Left     x 2, negative    BUNIONECTOMY      CARDIAC CATHETERIZATION  2012    CARPAL TUNNEL RELEASE Right     CATARACT REMOVAL WITH IMPLANT Right     COLONOSCOPY  2014    HYSTERECTOMY, TOTAL ABDOMINAL      non-cancerous    JOINT REPLACEMENT Bilateral     hip    KNEE ARTHROSCOPY Left     RECTOCELE REPAIR       Family History   Problem Relation Age of Onset    Heart Disease Father          age 80   Lua Diabetes Mother          age 80    Heart Disease Mother         CHF    Other Brother         PVD, obesity    Cancer Paternal Grandmother         breast     Social History     Socioeconomic History    Marital status:      Spouse name: Not on file    Number of children: Not on file    Years of education: Not on file    Highest education level: Not on file   Occupational History    Not on file   Tobacco Use    Smoking status: Never Smoker    Smokeless tobacco: Never Used   Vaping Use    Vaping Use: Never used   Substance and Sexual Activity    Alcohol use: Yes     Comment: occasionally    Drug use: No    Sexual activity: Not on file   Other Topics Concern    Not on file   Social History Narrative    Not on file     Social Determinants of Health     Financial Resource Strain: Low Risk     Difficulty of Paying Living Expenses: Not hard at all   Food Insecurity: No Food Insecurity    Worried About [address] [employer] in the Last Year: Never true    [address] [facility] [address] in the Last Year: Never true   Transportation Needs:     Lack of Transportation (Medical): Not on file    Lack of Transportation (Non-Medical):  Not on file   Physical Activity:     Days of Exercise per Week: Not on file    Minutes of Exercise per Session: Not on file   Stress:     Feeling of Stress : Not on file   Social Connections:     Frequency of Communication with Friends and Family: Not on file    Frequency of Social Gatherings with Friends and Family: Not on file    Attends [employer] Services: Not on file    Active Member of [address] [place] [address] or Organizations: Not on file    Attends Club or Organization Meetings: Not on file    Marital Status: Not on file   Intimate Partner Violence:     Fear of Current or Ex-Partner: Not on file    Emotionally Abused: Not on file    Physically Abused: Not on file    Sexually Abused: Not on file   Housing Stability:     Unable to Pay for Housing in the Last Year: Not on file    Number of [place] in the Last Year: Not on file    Unstable Housing in the Last Year: Not on file       Vitals:    02/23/22 1624   BP: 122/68   Pulse: 54   Resp: 18   Temp: 97.8 °F (36.6 °C)   SpO2: 96%   Weight: 160 lb 3.2 oz (72.7 kg)   Height: 5' 5\" (1.651 m)       Physical Exam  Constitutional: She is oriented to person, place, and time. She appears well-developed and well-nourished. No distress.   Neck: Normal range of motion. Neck supple. No JVD present. No thyromegaly present. Cardiovascular: Normal rate, regular rhythm, normal heart sounds and intact distal pulses. Exam reveals no gallop and no friction rub.   No murmur heard. Pulmonary/Chest: Effort normal and breath sounds normal. No respiratory distress. She has no wheezes. She has no rales. Abdominal: Soft. Bowel sounds are normal. She exhibits no distension and no mass. There is no tenderness. Musculoskeletal: Normal range of motion. She exhibits no edema.  Palpation to her spine demonstrating some tenderness over the mid and upper thoracic spine to palpation. neurological: She is alert and oriented to person, place, and time. She displays normal reflexes. No sensory deficit. She exhibits normal muscle tone. Coordination normal.   Skin: Skin is warm and dry. No rash noted. No erythema. Psychiatric: She has a normal mood and affect. Her behavior is normal.   Nursing note and vitals reviewed             Assessment and Plan:  Summer Wade was seen today for hypertension, hyperlipidemia and other. Diagnoses and all orders for this visit:    Essential hypertension  -     CBC with Auto Differential; Future  -     Comprehensive Metabolic Panel; Future    Major depressive disorder with single episode, in remission ([facility] [address]  -     escitalopram (LEXAPRO) 10 MG tablet; Take 1 tablet by mouth daily    Hyperlipidemia, unspecified hyperlipidemia type  -     pravastatin (PRAVACHOL) 20 MG tablet; Take 1 tablet by mouth daily    Anticoagulated  -     POCT INR    Neck pain  -     XR CERVICAL SPINE (2-3 VIEWS);  Future    Ulcerative colitis without complications, unspecified location [facility])    Thoracic back pain, unspecified back pain laterality, unspecified chronicity  -     XR THORACIC SPINE (3 VIEWS); Future    Other orders  -     lisinopril (PRINIVIL;ZESTRIL) 10 MG tablet; Take 1 tablet by mouth daily  -     warfarin (COUMADIN) 5 MG tablet; Take 1 tablet by mouth daily    Plan: 1 month for INR check. 3 months for regular follow-up visit. We will check thoracic and cervical spine x-rays. Consideration for referral to physical medicine. Stop hydrochlorothiazide and potassium. Monitor blood pressure closely. blood work in 2 weeks with this change. Fall precautions. Prescription management performed. Notify us with problems in the interim. Return in about 1 month (around 3/23/2022) for 1 month inr, 3 month reg visit. Seen By:  Eva Castro MD      *Document was created using voice recognition software. Note was reviewed however may contain grammatical errors.

## 2022-03-08 ENCOUNTER — TELEPHONE (OUTPATIENT)
Dept: FAMILY MEDICINE CLINIC | Age: 77
End: 2022-03-08

## 2022-03-08 LAB
A/G RATIO: 1.6 RATIO (ref 1.1–2.2)
ALBUMIN SERPL-MCNC: 4.1 G/DL (ref 3.4–4.8)
ALP BLD-CCNC: 54 U/L (ref 42–121)
ALT SERPL-CCNC: 14 U/L (ref 10–54)
ANION GAP SERPL CALCULATED.3IONS-SCNC: 9 MEQ/L (ref 3–11)
AST SERPL-CCNC: 19 U/L (ref 10–41)
BASOPHILS ABSOLUTE: 0 K/UL (ref 0–0.2)
BASOPHILS RELATIVE PERCENT: 0.4 % (ref 0–1.5)
BILIRUB SERPL-MCNC: 0.5 MG/DL (ref 0.3–1.5)
BUN BLDV-MCNC: 21 MG/DL (ref 8–21)
CALCIUM SERPL-MCNC: 8.8 MG/DL (ref 8.5–10.5)
CHLORIDE BLD-SCNC: 104 MEQ/L (ref 98–107)
CO2: 26 MEQ/L (ref 21–31)
CREAT SERPL-MCNC: 0.9 MG/DL (ref 0.4–1)
CREATININE + EGFR PANEL: 73 ML/MIN
EOSINOPHILS ABSOLUTE: 0 K/UL (ref 0–0.33)
EOSINOPHILS RELATIVE PERCENT: 1.1 % (ref 0–3)
GFR NON-AFRICAN AMERICAN: 61 ML/MIN
GLOBULIN: 2.6 G/DL (ref 1.9–3.9)
GLUCOSE BLD-MCNC: 85 MG/DL (ref 70–99)
HCT VFR BLD CALC: 33.5 % (ref 36–44)
HEMOGLOBIN: 11.2 G/DL (ref 12–15)
LYMPHOCYTES ABSOLUTE: 1.3 K/UL (ref 1.1–4.8)
LYMPHOCYTES RELATIVE PERCENT: 35 % (ref 24–44)
MCH RBC QN AUTO: 29.8 PG (ref 28–34)
MCHC RBC AUTO-ENTMCNC: 33.4 G/DL (ref 33–37)
MCV RBC AUTO: 89.1 FL (ref 80–100)
MONOCYTES ABSOLUTE: 0.3 K/UL (ref 0.2–0.7)
MONOCYTES RELATIVE PERCENT: 8.9 % (ref 3.4–9)
NEUTROPHILS ABSOLUTE: 2.1 K/UL (ref 1.83–8.7)
PDW BLD-RTO: 15.1 % (ref 10.9–14.3)
PLATELET # BLD: 140 K/UL (ref 150–450)
PMV BLD AUTO: 8.4 FL (ref 7.4–10.4)
POTASSIUM SERPL-SCNC: 4.1 MEQ/L (ref 3.6–5)
RBC # BLD: 3.76 M/UL (ref 4–4.9)
SEGMENTED NEUTROPHILS RELATIVE PERCENT: 54.6 % (ref 40–74)
SODIUM BLD-SCNC: 139 MEQ/L (ref 135–145)
TOTAL PROTEIN: 6.7 G/DL (ref 5.9–7.8)
WBC # BLD: 3.8 K/UL (ref 4.5–11)

## 2022-03-08 NOTE — TELEPHONE ENCOUNTER
Mild osteoarthritic changes to both the cervical and thoracic spine along with scoliosis to the thoracic spine. If her pain is such that she wishes to be referred I could send her to physical medicine. See what she would like to do.

## 2022-03-09 NOTE — TELEPHONE ENCOUNTER
Patient's daughter, Kaitlin Mario, notified of her labs results as well as her x-ray results. She verbalized understanding and states that she will let her mom know and will see what she decides that she wants to do.

## 2022-03-15 ENCOUNTER — OFFICE VISIT (OUTPATIENT)
Dept: CARDIOLOGY CLINIC | Age: 77
End: 2022-03-15
Payer: MEDICARE

## 2022-03-15 VITALS
HEIGHT: 65 IN | WEIGHT: 164 LBS | HEART RATE: 64 BPM | RESPIRATION RATE: 16 BRPM | SYSTOLIC BLOOD PRESSURE: 112 MMHG | BODY MASS INDEX: 27.32 KG/M2 | OXYGEN SATURATION: 98 % | DIASTOLIC BLOOD PRESSURE: 74 MMHG

## 2022-03-15 DIAGNOSIS — R42 LIGHTHEADEDNESS: ICD-10-CM

## 2022-03-15 DIAGNOSIS — I38 VHD (VALVULAR HEART DISEASE): ICD-10-CM

## 2022-03-15 DIAGNOSIS — R94.31 ACUTE ELECTROCARDIOGRAM CHANGES: ICD-10-CM

## 2022-03-15 DIAGNOSIS — I10 ESSENTIAL HYPERTENSION: ICD-10-CM

## 2022-03-15 DIAGNOSIS — E78.00 PURE HYPERCHOLESTEROLEMIA: ICD-10-CM

## 2022-03-15 DIAGNOSIS — I48.0 PAF (PAROXYSMAL ATRIAL FIBRILLATION) (HCC): Primary | ICD-10-CM

## 2022-03-15 PROCEDURE — 93000 ELECTROCARDIOGRAM COMPLETE: CPT | Performed by: INTERNAL MEDICINE

## 2022-03-15 PROCEDURE — 1090F PRES/ABSN URINE INCON ASSESS: CPT | Performed by: INTERNAL MEDICINE

## 2022-03-15 PROCEDURE — 4040F PNEUMOC VAC/ADMIN/RCVD: CPT | Performed by: INTERNAL MEDICINE

## 2022-03-15 PROCEDURE — G8417 CALC BMI ABV UP PARAM F/U: HCPCS | Performed by: INTERNAL MEDICINE

## 2022-03-15 PROCEDURE — G8427 DOCREV CUR MEDS BY ELIG CLIN: HCPCS | Performed by: INTERNAL MEDICINE

## 2022-03-15 PROCEDURE — 1123F ACP DISCUSS/DSCN MKR DOCD: CPT | Performed by: INTERNAL MEDICINE

## 2022-03-15 PROCEDURE — 99214 OFFICE O/P EST MOD 30 MIN: CPT | Performed by: INTERNAL MEDICINE

## 2022-03-15 PROCEDURE — G8400 PT W/DXA NO RESULTS DOC: HCPCS | Performed by: INTERNAL MEDICINE

## 2022-03-15 PROCEDURE — G8484 FLU IMMUNIZE NO ADMIN: HCPCS | Performed by: INTERNAL MEDICINE

## 2022-03-15 PROCEDURE — 1036F TOBACCO NON-USER: CPT | Performed by: INTERNAL MEDICINE

## 2022-03-15 RX ORDER — METOPROLOL SUCCINATE 50 MG/1
50 TABLET, EXTENDED RELEASE ORAL DAILY
Qty: 90 TABLET | Refills: 3 | Status: SHIPPED | OUTPATIENT
Start: 2022-03-15

## 2022-03-15 NOTE — PROGRESS NOTES
Patient Active Problem List   Diagnosis    HTN (hypertension)    Chest pain    Ulcerative colitis without complications ([facility]    Hyperlipidemia    Major depressive disorder with single episode, in remission ([facility]    RLS (restless legs syndrome)    Anemia    VHD (valvular heart disease)    Lightheadedness       Current Outpatient Medications   Medication Sig Dispense Refill    metoprolol succinate (TOPROL XL) 50 MG extended release tablet Take 1 tablet by mouth daily 90 tablet 3    escitalopram (LEXAPRO) 10 MG tablet Take 1 tablet by mouth daily 90 tablet 1    lisinopril (PRINIVIL;ZESTRIL) 10 MG tablet Take 1 tablet by mouth daily 90 tablet 1    pravastatin (PRAVACHOL) 20 MG tablet Take 1 tablet by mouth daily 90 tablet 1    warfarin (COUMADIN) 5 MG tablet Take 1 tablet by mouth daily 90 tablet 1    warfarin (COUMADIN) 1 MG tablet Take 1 tablet by mouth daily 90 tablet 1    Cyanocobalamin (B-12) 1000 MCG TABS Take by mouth daily      MELATONIN ER PO Take by mouth nightly      Propylene Glycol-Glycerin (SOOTHE OP) Apply to eye      vitamin D 25 MCG (1000 UT) CAPS Take by mouth daily       omeprazole (PRILOSEC) 20 MG delayed release capsule Take 20 mg by mouth daily       Biotin 18604 MCG TABS Take by mouth daily       nitroGLYCERIN (NITROSTAT) 0.4 MG SL tablet Place 1 tablet under the tongue every 5 minutes as needed for Chest pain up to max of 3 total doses. If no relief after 1 dose, call 911. (Patient not taking: Reported on 3/15/2022) 25 tablet 0     No current facility-administered medications for this visit. CC:    Patient is seen in follow up for:  1. PAF (paroxysmal atrial fibrillation) ([facility]    2. Essential hypertension    3. Pure hypercholesterolemia    4. Lightheadedness    5. VHD (valvular heart disease)    6. Acute electrocardiogram changes        HPI:  Notes shortness of breath with exertion. No chest pain. Tolerating beta-blocker therapy and anticoagulation well so far. ROS:   General: No unusual weight gain,  change in exercise tolerance  Skin: No rash or itching  EENT: No vision changes or nosebleeds  Cardiovascular: No orthopnea or paroxysmal nocturnal dyspnea  Respiratory: No cough or hemoptysis  Gastrointestinal: No hematemesis or recent changes in bowel habits  Genitourinary: No hematuria, urgency or frequency  Musculoskeletal: No muscular weakness or joint swelling   Neurologic / Psychiatric: No incoordination or convulsions  Allergic / Immunologic/ Lymphatic / Endocrine: No anemia or bleeding tendency    Social History     Socioeconomic History    Marital status:      Spouse name: Not on file    Number of children: Not on file    Years of education: Not on file    Highest education level: Not on file   Occupational History    Not on file   Tobacco Use    Smoking status: Never Smoker    Smokeless tobacco: Never Used   Vaping Use    Vaping Use: Never used   Substance and Sexual Activity    Alcohol use: Yes     Comment: occasionally    Drug use: No    Sexual activity: Not on file   Other Topics Concern    Not on file   Social History Narrative    Not on file     Social Determinants of Health     Financial Resource Strain: Low Risk     Difficulty of Paying Living Expenses: Not hard at all   Food Insecurity: No Food Insecurity    Worried About [facility] [facility] in the Last Year: Never true    [facility] in the Last Year: Never true   Transportation Needs:     Lack of Transportation (Medical): Not on file    Lack of Transportation (Non-Medical):  Not on file   Physical Activity:     Days of Exercise per Week: Not on file    Minutes of Exercise per Session: Not on file   Stress:     Feeling of Stress : Not on file   Social Connections:     Frequency of Communication with Friends and Family: Not on file    Frequency of Social Gatherings with Friends and Family: Not on file    Attends Anabaptist Services: Not on file   [employer]s or Organizations: Not on file    Attends Club or Organization Meetings: Not on file    Marital Status: Not on file   Intimate Partner Violence:     Fear of Current or Ex-Partner: Not on file    Emotionally Abused: Not on file    Physically Abused: Not on file    Sexually Abused: Not on file   Housing Stability:     Unable to Pay for Housing in the Last Year: Not on file    Number of [place] in the Last Year: Not on file    Unstable Housing in the Last Year: Not on file       Family History   Problem Relation Age of Onset    Heart Disease Father          age 80    Diabetes Mother          age 80    Heart Disease Mother         CHF    Other Brother         PVD, obesity    Cancer Paternal Grandmother         breast       Past Medical History:   Diagnosis Date    Anemia     Cholelithiasis     Chronic congestion of paranasal sinus     Colon polyps     Depression     Diastolic dysfunction     Diverticulosis     HTN (hypertension)     Hyperlipidemia 2019    Irritable bladder     Lumbar degenerative disc disease     Lumbar herniated disc     Lumbar spinal stenosis     Major depressive disorder with single episode, in remission ([address] 2019    Osteoarthritis     Overactive bladder     RLS (restless legs syndrome) 2019    [facility]     Ulcerative colitis without complications ([facility]     Valvular heart disease     Vitamin D deficiency     Wrist fracture     Right       PHYSICAL EXAM:  CONSTITUTIONAL:  Well developed, well nourished    Vitals:    03/15/22 1005   BP: 112/74   Pulse: 64   Resp: 16   SpO2: 98%   Weight: 164 lb (74.4 kg)   Height: 5' 5\" (1.651 m)     HEAD & FACE: Normocephalic. Symmetric. EYES: No xanthelasma. Conjunctivae not injected. EARS, NOSE, MOUTH & THROAT: Good dentition. No oral pallor or cyanosis. NECK: No JVD at 30 degrees. No thyromegaly.   RESPIRATORY: Clear to auscultation and percussion in all fields. No use of accessory muscle or intercostal retractions. CARDIOVASCULAR: Regular rate and rhythm. No lifts or thrills on palpitation. Auscultation with normal S1-S2 in intensity and splitting. No carotid bruits. Abdominal aorta not enlarged. Femoral arteries without bruits. Pedal pulses 2+. No edema. ABDOMEN: Soft without hepatic or splenic enlargement. No tenderness. MUSCULOSKELETAL: No kyphosis or scoliosis of the back. Good muscle strength and tone. No muscle atrophy. Normal gait and ability to undergo exercise stress testing. EXTREMITIES: No clubbing or cyanosis. SKIN: No Xanthomas or ulcerations. NEUROLOGIC: Oriented to time, place and person. Normal mood and affect. LYMPHATIC:  No palpable neck or supraclavicular nodes. No splenomegaly. EKG: the EKG tracing was reviewed and found to reveal: Normal sinus rhythm. Anterior T wave inversions.  ms. New change compared to prior tracing. ASSESSMENT:                                                     ORDERS:       Diagnosis Orders   1. PAF (paroxysmal atrial fibrillation) (HCC)  EKG 12 Lead   2. Essential hypertension  EKG 12 Lead   3. Pure hypercholesterolemia  EKG 12 Lead   4. Lightheadedness  EKG 12 Lead   5. VHD (valvular heart disease)  EKG 12 Lead   6. Acute electrocardiogram changes  NM Cardiac Stress Test Nuclear Imaging    Cardiac Stress Test - w/Pharm   Shortness of breath with exertion and new EKG changes. Will require stress testing. Pharmacologic imaging will be required due to inability to ambulate adequately. PLAN:   See above orders. Medication reconciliation completed. Old records were reviewed and found to reveal: Creatinine 0.9 on 3/8/2022  Discussed issues that would prompt earlier evaluation. Same cardiac medications. Follow-up office visit in 6 months -    pending above result.

## 2022-03-30 ENCOUNTER — OFFICE VISIT (OUTPATIENT)
Dept: FAMILY MEDICINE CLINIC | Age: 77
End: 2022-03-30
Payer: MEDICARE

## 2022-03-30 VITALS
BODY MASS INDEX: 27.06 KG/M2 | HEIGHT: 65 IN | DIASTOLIC BLOOD PRESSURE: 74 MMHG | HEART RATE: 66 BPM | WEIGHT: 162.4 LBS | TEMPERATURE: 97.7 F | SYSTOLIC BLOOD PRESSURE: 158 MMHG | OXYGEN SATURATION: 98 %

## 2022-03-30 VITALS
SYSTOLIC BLOOD PRESSURE: 158 MMHG | WEIGHT: 162 LBS | HEART RATE: 66 BPM | BODY MASS INDEX: 26.99 KG/M2 | TEMPERATURE: 97.7 F | OXYGEN SATURATION: 98 % | HEIGHT: 65 IN | DIASTOLIC BLOOD PRESSURE: 74 MMHG

## 2022-03-30 DIAGNOSIS — Z00.00 MEDICARE ANNUAL WELLNESS VISIT, SUBSEQUENT: Primary | ICD-10-CM

## 2022-03-30 DIAGNOSIS — E78.5 HYPERLIPIDEMIA, UNSPECIFIED HYPERLIPIDEMIA TYPE: ICD-10-CM

## 2022-03-30 DIAGNOSIS — Z79.01 ANTICOAGULATED: Primary | ICD-10-CM

## 2022-03-30 DIAGNOSIS — F32.5 MAJOR DEPRESSIVE DISORDER WITH SINGLE EPISODE, IN REMISSION (HCC): ICD-10-CM

## 2022-03-30 DIAGNOSIS — M54.6 THORACIC BACK PAIN, UNSPECIFIED BACK PAIN LATERALITY, UNSPECIFIED CHRONICITY: ICD-10-CM

## 2022-03-30 DIAGNOSIS — I10 ESSENTIAL HYPERTENSION: ICD-10-CM

## 2022-03-30 DIAGNOSIS — I48.0 PAF (PAROXYSMAL ATRIAL FIBRILLATION) (HCC): ICD-10-CM

## 2022-03-30 LAB
INTERNATIONAL NORMALIZATION RATIO, POC: 2.1
PROTHROMBIN TIME, POC: 25.8

## 2022-03-30 PROCEDURE — 99214 OFFICE O/P EST MOD 30 MIN: CPT | Performed by: INTERNAL MEDICINE

## 2022-03-30 PROCEDURE — 4040F PNEUMOC VAC/ADMIN/RCVD: CPT | Performed by: INTERNAL MEDICINE

## 2022-03-30 PROCEDURE — G8484 FLU IMMUNIZE NO ADMIN: HCPCS | Performed by: INTERNAL MEDICINE

## 2022-03-30 PROCEDURE — 1090F PRES/ABSN URINE INCON ASSESS: CPT | Performed by: INTERNAL MEDICINE

## 2022-03-30 PROCEDURE — 85610 PROTHROMBIN TIME: CPT | Performed by: INTERNAL MEDICINE

## 2022-03-30 PROCEDURE — G8417 CALC BMI ABV UP PARAM F/U: HCPCS | Performed by: INTERNAL MEDICINE

## 2022-03-30 PROCEDURE — 1123F ACP DISCUSS/DSCN MKR DOCD: CPT | Performed by: INTERNAL MEDICINE

## 2022-03-30 PROCEDURE — 1036F TOBACCO NON-USER: CPT | Performed by: INTERNAL MEDICINE

## 2022-03-30 PROCEDURE — G8400 PT W/DXA NO RESULTS DOC: HCPCS | Performed by: INTERNAL MEDICINE

## 2022-03-30 PROCEDURE — G0439 PPPS, SUBSEQ VISIT: HCPCS | Performed by: INTERNAL MEDICINE

## 2022-03-30 PROCEDURE — G8427 DOCREV CUR MEDS BY ELIG CLIN: HCPCS | Performed by: INTERNAL MEDICINE

## 2022-03-30 RX ORDER — LISINOPRIL 20 MG/1
20 TABLET ORAL DAILY
Qty: 90 TABLET | Refills: 1 | Status: SHIPPED
Start: 2022-03-30 | End: 2022-05-23

## 2022-03-30 SDOH — HEALTH STABILITY: PHYSICAL HEALTH: ON AVERAGE, HOW MANY DAYS PER WEEK DO YOU ENGAGE IN MODERATE TO STRENUOUS EXERCISE (LIKE A BRISK WALK)?: 3 DAYS

## 2022-03-30 SDOH — HEALTH STABILITY: PHYSICAL HEALTH: ON AVERAGE, HOW MANY MINUTES DO YOU ENGAGE IN EXERCISE AT THIS LEVEL?: 30 MIN

## 2022-03-30 ASSESSMENT — PATIENT HEALTH QUESTIONNAIRE - PHQ9
8. MOVING OR SPEAKING SO SLOWLY THAT OTHER PEOPLE COULD HAVE NOTICED. OR THE OPPOSITE, BEING SO FIGETY OR RESTLESS THAT YOU HAVE BEEN MOVING AROUND A LOT MORE THAN USUAL: 0
SUM OF ALL RESPONSES TO PHQ QUESTIONS 1-9: 0
2. FEELING DOWN, DEPRESSED OR HOPELESS: 0
9. THOUGHTS THAT YOU WOULD BE BETTER OFF DEAD, OR OF HURTING YOURSELF: 0
SUM OF ALL RESPONSES TO PHQ QUESTIONS 1-9: 0
SUM OF ALL RESPONSES TO PHQ QUESTIONS 1-9: 0
10. IF YOU CHECKED OFF ANY PROBLEMS, HOW DIFFICULT HAVE THESE PROBLEMS MADE IT FOR YOU TO DO YOUR WORK, TAKE CARE OF THINGS AT HOME, OR GET ALONG WITH OTHER PEOPLE: 0
SUM OF ALL RESPONSES TO PHQ QUESTIONS 1-9: 0
6. FEELING BAD ABOUT YOURSELF - OR THAT YOU ARE A FAILURE OR HAVE LET YOURSELF OR YOUR FAMILY DOWN: 0
1. LITTLE INTEREST OR PLEASURE IN DOING THINGS: 0
SUM OF ALL RESPONSES TO PHQ QUESTIONS 1-9: 0
1. LITTLE INTEREST OR PLEASURE IN DOING THINGS: 0
7. TROUBLE CONCENTRATING ON THINGS, SUCH AS READING THE NEWSPAPER OR WATCHING TELEVISION: 0
2. FEELING DOWN, DEPRESSED OR HOPELESS: 0
4. FEELING TIRED OR HAVING LITTLE ENERGY: 0
5. POOR APPETITE OR OVEREATING: 0
SUM OF ALL RESPONSES TO PHQ QUESTIONS 1-9: 0
SUM OF ALL RESPONSES TO PHQ9 QUESTIONS 1 & 2: 0
3. TROUBLE FALLING OR STAYING ASLEEP: 0
SUM OF ALL RESPONSES TO PHQ9 QUESTIONS 1 & 2: 0

## 2022-03-30 ASSESSMENT — LIFESTYLE VARIABLES
HOW OFTEN DO YOU HAVE A DRINK CONTAINING ALCOHOL: NEVER
HOW MANY STANDARD DRINKS CONTAINING ALCOHOL DO YOU HAVE ON A TYPICAL DAY: 1
HOW MANY STANDARD DRINKS CONTAINING ALCOHOL DO YOU HAVE ON A TYPICAL DAY: 1 OR 2
HOW OFTEN DO YOU HAVE SIX OR MORE DRINKS ON ONE OCCASION: 1
HOW OFTEN DO YOU HAVE A DRINK CONTAINING ALCOHOL: 1

## 2022-03-30 NOTE — PROGRESS NOTES
[facility]     3/30/22  Rula Hope : 1945 Sex: female  Age: 65 y.o. Chief Complaint   Patient presents with    Office Visit for Anticoagulation Management     1 month INR    Hypertension       HPI    Patient presents today accompanied by her daughter for 1 month follow-up for anticoagulation management/INR check. Also having difficulty with her blood pressure. She brings in a list of numbers all of which are high. She is on 10 mg of lisinopril currently. We did hold her hydrochlorothiazide and potassium last visit because of some hypotension and dizziness. She has not gone in the other direction. They did call in a few days ago and I did asked him to double the dose of her lisinopril but she never did this. I told her I did want her to increase lisinopril to 20 mg. She also brings her blood pressure cuff in for us to compare with ours and when I checked it personally they were very close. I have to believe her numbers she brings in her fairly accurate and high. Other issues include back pain for which she is really doing nothing at this point outside of taking Tylenol every once in a while. We did discuss her x-ray findings from last time including thoracic spine and cervical spine demonstrating arthritic changes. I did offer physical medicine referral or have her go back to her surgeon who did her back surgery previously she is declined both of these. I asked her to start using extra strength Tylenol up to 3 g/day as well as lidocaine patch and possibly Voltaren gel that she has at home. She did see cardiology recently and I reviewed their note. She is set up for stress test upcoming. Her depression is stable and controlled on her SSRI. GERD has been stable on her PPI. Her INR today was 2.3, 6 mg of Coumadin daily. She was supposed to be on 6 mg Monday through Saturday and 3 mg  but daughter mistook things and kept her on the 6 mg .   As stated numbers were okay however  . She follows with GI, orthopedics and now cardiology       Review of Systems     Constitutional: Negative for activity change, appetite change, chills, diaphoresis, , fever and unexpected weight change.    HENT: Negative  Respiratory: Negative for cough,  and wheezing.   Positive for dyspnea on exertion-improved  Cardiovascular: Negative for palpitations and leg swelling.  Did have chest tightness-seeing cardiology and  negative stress test 7/21-resolved--recent PAF on anticoagulation  gastrointestinal: Negative for abdominal pain, blood in stool, constipation, diarrhea, nausea and vomiting.  Does have history of ulcerative colitis. -Quiescent at this point  Endocrine: Negative.    Genitourinary:  Negative for difficulty urinating, dysuria, frequency and hematuria.    No longer seeing urology  Musculoskeletal: Positive for arthralgias and back pain. Negative for myalgias.       Skin: Negative.    Allergic/Immunologic: Negative for environmental allergies and immunocompromised state. Neurological: Negative for, weakness, light-headedness, numbness and headaches. Hematological: Negative. psychiatric/Behavioral:        Depression has improved  on her SSRI      REST OF PERTINENT ROS GONE OVER AND WAS NEGATIVE.      PMH:  Health Maintenance:  Mammogram - (7/9/2018)  Mammogram Screening - (7/9/2018)  Influenza Vaccination - (9/20/2018)  Couseled on Home Safety - (2/26/2018)  Colonoscopy - (1/16/2018), 8/21-due 26  Colonoscopy Screening - (1/16/2018)  Bone Density Scan - (9/29/2016)  Pneumonia Vaccination - (9/22/2016)  Tetanus Immunization - (9/22/2016)  pt cannot recall if she had this vaccination, no record from pharmacy  Stress Test - 11/11,9/15, 7/21-negative  heart cath - 2/12-ok  Colonoscopy - 2/14,8/14,1/18-due 21  EKG - 3/14, 11/17  has Gyn - Dr. Nawaf Cruz  2D ECHO - 9/15  Hemmocult Cards - 10/16-neg x 3  EGD - 1/18, 10/19,-due 22, 8/21-due 24  Prevnar Vaccine - (11/3/2015) [address] Problems:  Hypertension, Restless Leg Syndrome, irritable bladder, Depression  Ulcerative Colitis - follows with dr Katy Phelps  Anemia - follows with dr King Garg Polyps, over active bladder, vit D defic, chronic sinus congestion, Osteoarthritis, Diverticulosis, fx  right wrist, Pfo, Hyperlipidemia, Diastolic Dysfunction, Cholelithiasis, fracture right lateral malleolus,  Valvular Heart Disease, Lumbar DDD, Lumbar Spinal Stenosis, Lumbar herniated disc  Paroxysmal atrial fibrillation--anticoagulated with warfarin     Surgical Hx:  DEON - Non-cancerous  Left breast mass removal - x2-benign  Right carpal tunnel surgery, Rectocele repair, Left knee arthroscopy, Bilateral total hip arthroplasty,  Bladder suspension, Bunion surgery, Bilateral cataract surgery, Repair of torn meniscus left knee,  Posterior-lateral fusion L2-5    Right total knee arthroplasty-  Left total knee arthroplasty-   old records reviewed  FH:  Father:  Heart Disease -  age 80. Mother:  Non Insulin Dependent Diabetes -  age82  Congestive Heart Failure (CHF). Brother 1:  Obesity, Peripheral Vascular Disease (PVD). Maternal Grandmother:  Breast Cancer. SH:  Marital: Legal Status: . retired   Personal Habits: Cigarette Use: Does Not Smoke. Alcohol: Denies alcohol use                 Current Outpatient Medications:     lisinopril (PRINIVIL;ZESTRIL) 20 MG tablet, Take 1 tablet by mouth daily, Disp: 90 tablet, Rfl: 1    metoprolol succinate (TOPROL XL) 50 MG extended release tablet, Take 1 tablet by mouth daily, Disp: 90 tablet, Rfl: 3    escitalopram (LEXAPRO) 10 MG tablet, Take 1 tablet by mouth daily, Disp: 90 tablet, Rfl: 1    pravastatin (PRAVACHOL) 20 MG tablet, Take 1 tablet by mouth daily, Disp: 90 tablet, Rfl: 1    warfarin (COUMADIN) 5 MG tablet, Take 1 tablet by mouth daily, Disp: 90 tablet, Rfl: 1    warfarin (COUMADIN) 1 MG tablet, Take 1 tablet by mouth daily, Disp: 90 tablet, Rfl: 1   nitroGLYCERIN (NITROSTAT) 0.4 MG SL tablet, Place 1 tablet under the tongue every 5 minutes as needed for Chest pain up to max of 3 total doses.  If no relief after 1 dose, call 911., Disp: 25 tablet, Rfl: 0    Cyanocobalamin (B-12) 1000 MCG TABS, Take by mouth daily, Disp: , Rfl:     MELATONIN ER PO, Take by mouth nightly, Disp: , Rfl:     Propylene Glycol-Glycerin (SOOTHE OP), Apply to eye, Disp: , Rfl:     vitamin D 25 MCG (1000 UT) CAPS, Take by mouth daily , Disp: , Rfl:     omeprazole (PRILOSEC) 20 MG delayed release capsule, Take 20 mg by mouth daily , Disp: , Rfl:     Biotin 07339 MCG TABS, Take by mouth daily , Disp: , Rfl:   Allergies   Allergen Reactions    Erythromycin Hives    Penicillin G     Azithromycin     Penicillins Hives    Propoxyphene        Past Medical History:   Diagnosis Date    Anemia     Cholelithiasis     Chronic congestion of paranasal sinus     Colon polyps     Depression     Diastolic dysfunction     Diverticulosis     HTN (hypertension)     Hyperlipidemia 06/20/2019    Irritable bladder     Lumbar degenerative disc disease     Lumbar herniated disc     Lumbar spinal stenosis     Major depressive disorder with single episode, in remission ([facility] 06/20/2019    Osteoarthritis     Overactive bladder     RLS (restless legs syndrome) 06/20/2019    Ulcerative colitis ([facility]     Ulcerative colitis without complications ([facility] 77/09/3148    Valvular heart disease     Vitamin D deficiency     Wrist fracture     Right     Past Surgical History:   Procedure Laterality Date    BLADDER SUSPENSION  1978    BREAST LUMPECTOMY Left     x 2, negative    BUNIONECTOMY  1990    CARDIAC CATHETERIZATION  2/2012    CARPAL TUNNEL RELEASE Right     CATARACT REMOVAL WITH IMPLANT Right     COLONOSCOPY  2/2014    HYSTERECTOMY, TOTAL ABDOMINAL      non-cancerous    JOINT REPLACEMENT Bilateral     hip    KNEE ARTHROSCOPY Left     RECTOCELE REPAIR       Family History Problem Relation Age of Onset    Heart Disease Father          age 80    Diabetes Mother          age 80    Heart Disease Mother         CHF    Other Brother         PVD, obesity    Cancer Paternal Grandmother         breast     Social History     Socioeconomic History    Marital status:      Spouse name: Not on file    Number of children: Not on file    Years of education: Not on file    Highest education level: Not on file   Occupational History    Not on file   Tobacco Use    Smoking status: Never Smoker    Smokeless tobacco: Never Used   Vaping Use    Vaping Use: Never used   Substance and Sexual Activity    Alcohol use: Yes     Comment: occasionally    Drug use: No    Sexual activity: Not on file   Other Topics Concern    Not on file   Social History Narrative    Not on file     Social Determinants of Health     Financial Resource Strain: Low Risk     Difficulty of Paying Living Expenses: Not hard at all   Food Insecurity: No Food Insecurity    Worried About [address] [employer] in the Last Year: Never true    [facility] in the Last Year: Never true   Transportation Needs:     Lack of Transportation (Medical): Not on file    Lack of Transportation (Non-Medical):  Not on file   Physical Activity: Insufficiently Active    Days of Exercise per Week: 3 days    Minutes of Exercise per Session: 30 min   Stress:     Feeling of Stress : Not on file   Social Connections:     Frequency of Communication with Friends and Family: Not on file    Frequency of Social Gatherings with Friends and Family: Not on file    Attends [employer] Services: Not on file    Active Member of Clubs or Organizations: Not on file    Attends Club or Organization Meetings: Not on file    Marital Status: Not on file   Intimate Partner Violence:     Fear of Current or Ex-Partner: Not on file    Emotionally Abused: Not on file    Physically Abused: Not on file    Sexually Abused: Not on file Housing Stability:     Unable to Pay for Housing in the Last Year: Not on file    Number of Places Lived in the Last Year: Not on file    Unstable Housing in the Last Year: Not on file       Vitals:    03/30/22 1610 03/30/22 1623   BP: (!) 176/82 (!) 158/74   Pulse: 66    Temp: 97.7 °F (36.5 °C)    TempSrc: Temporal    SpO2: 98%    Weight: 162 lb 6.4 oz (73.7 kg)    Height: 5' 5\" (1.651 m)        Physical Exam    Constitutional: She is oriented to person, place, and time. She appears well-developed and well-nourished. No distress.   Neck: Normal range of motion. Neck supple. No JVD present. No thyromegaly present. Cardiovascular: Normal rate, regular rhythm, normal heart sounds and intact distal pulses. Exam reveals no gallop and no friction rub.   No murmur heard. Pulmonary/Chest: Effort normal and breath sounds normal. No respiratory distress. She has no wheezes. She has no rales. Abdominal: Soft. Bowel sounds are normal. She exhibits no distension and no mass. There is no tenderness. Musculoskeletal: Normal range of motion. She exhibits no edema.  Palpation to her spine/paraspinal region-unable to elicit pain at this time. Neurological: She is alert and oriented to person, place, and time. She displays normal reflexes. No sensory deficit. She exhibits normal muscle tone. Coordination normal.   Skin: Skin is warm and dry. No rash noted. No erythema. Psychiatric: She has a normal mood and affect. Her behavior is normal.   Nursing note and vitals reviewed         Assessment and Plan:  Sherryle Becton was seen today for office visit for anticoagulation management and hypertension. Diagnoses and all orders for this visit:    Anticoagulated  -     POCT INR    Essential hypertension  -     Basic Metabolic Panel;  Future    Major depressive disorder with single episode, in remission ([facility]    PAF (paroxysmal atrial fibrillation) ([facility]    Hyperlipidemia, unspecified hyperlipidemia type  -     Lipid Panel; Future    Thoracic back pain, unspecified back pain laterality, unspecified chronicity    Other orders  -     lisinopril (PRINIVIL;ZESTRIL) 20 MG tablet; Take 1 tablet by mouth daily    Plan: I will increase the dose of lisinopril from 10 to 20 mg daily. She is to continue monitoring blood pressure closely and call in numbers in the next week. Same dose Coumadin, recheck INR in a month. Regular follow-up visit in 2 months. We will get blood work on her including BMP and lipids in 2 weeks. Reviewed cardiology note. She is to follow-up with them and have upcoming stress testing. Notify us if she changes her mind about back evaluation. Notify us with problems in the interim. Return in about 1 month (around 4/30/2022) for 1 month inr,2 month reg visit. Seen By:  Nahomy Ac MD      *Document was created using voice recognition software. Note was reviewed however may contain grammatical errors.

## 2022-03-30 NOTE — PATIENT INSTRUCTIONS
Personalized Preventive Plan for Shalini Snowden - 3/30/2022  Medicare offers a range of preventive health benefits. Some of the tests and screenings are paid in full while other may be subject to a deductible, co-insurance, and/or copay. Some of these benefits include a comprehensive review of your medical history including lifestyle, illnesses that may run in your family, and various assessments and screenings as appropriate. After reviewing your medical record and screening and assessments performed today your provider may have ordered immunizations, labs, imaging, and/or referrals for you. A list of these orders (if applicable) as well as your Preventive Care list are included within your After Visit Summary for your review. Other Preventive Recommendations:    · A preventive eye exam performed by an eye specialist is recommended every 1-2 years to screen for glaucoma; cataracts, macular degeneration, and other eye disorders. · A preventive dental visit is recommended every 6 months. · Try to get at least 150 minutes of exercise per week or 10,000 steps per day on a pedometer . · Order or download the FREE \"Exercise & Physical Activity: Your Everyday Guide\" from [employer]. Call 8-298.907.2152 or search [employer] online. · You need 9515-7132 mg of calcium and 5963-3971 IU of vitamin D per day. It is possible to meet your calcium requirement with diet alone, but a vitamin D supplement is usually necessary to meet this goal.  · When exposed to the sun, use a sunscreen that protects against both UVA and UVB radiation with an SPF of 30 or greater. Reapply every 2 to 3 hours or after sweating, drying off with a towel, or swimming. · Always wear a seat belt when traveling in a car. Always wear a helmet when riding a bicycle or motorcycle.
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
## 2022-04-05 ENCOUNTER — CARE COORDINATION (OUTPATIENT)
Dept: CARE COORDINATION | Age: 77
End: 2022-04-05

## 2022-04-05 NOTE — CARE COORDINATION
ACM attempted to contact patient. Phone was answered by Larissa(daughter) who states she gives her phone number for her mother. ACM explained care coordination services to [facility]. She denies Akua Chan has any needs at this time and declines enrollment.   Will place on temporary exclusion list.

## 2022-04-07 ENCOUNTER — TELEPHONE (OUTPATIENT)
Dept: CARDIOLOGY | Age: 77
End: 2022-04-07

## 2022-04-07 NOTE — TELEPHONE ENCOUNTER
Spoke w/ daughter, Tomy Fowler, to confirm stress test on 4/11/22. Instructions given included: nothing to eat/drink after midnight except sips of water, hold all forms of caffeine for 12 hours prior to test, hold Toprol for 8 hrs prior to test.  All questions answered.

## 2022-04-11 ENCOUNTER — HOSPITAL ENCOUNTER (OUTPATIENT)
Dept: CARDIOLOGY | Age: 77
Discharge: HOME | End: 2022-04-11
Payer: MEDICARE

## 2022-04-11 VITALS
DIASTOLIC BLOOD PRESSURE: 80 MMHG | RESPIRATION RATE: 16 BRPM | HEIGHT: 64 IN | BODY MASS INDEX: 27.31 KG/M2 | SYSTOLIC BLOOD PRESSURE: 110 MMHG | HEART RATE: 76 BPM | WEIGHT: 160 LBS

## 2022-04-11 DIAGNOSIS — R07.9 CHEST PAIN, UNSPECIFIED TYPE: Primary | ICD-10-CM

## 2022-04-11 PROCEDURE — 3430000000 HC RX DIAGNOSTIC RADIOPHARMACEUTICAL: Performed by: INTERNAL MEDICINE

## 2022-04-11 PROCEDURE — 6360000002 HC RX W HCPCS: Performed by: INTERNAL MEDICINE

## 2022-04-11 PROCEDURE — 93017 CV STRESS TEST TRACING ONLY: CPT

## 2022-04-11 PROCEDURE — 2580000003 HC RX 258: Performed by: INTERNAL MEDICINE

## 2022-04-11 PROCEDURE — A9500 TC99M SESTAMIBI: HCPCS | Performed by: INTERNAL MEDICINE

## 2022-04-11 PROCEDURE — 78452 HT MUSCLE IMAGE SPECT MULT: CPT

## 2022-04-11 RX ORDER — SODIUM CHLORIDE 0.9 % (FLUSH) 0.9 %
10 SYRINGE (ML) INJECTION PRN
Status: DISCONTINUED | OUTPATIENT
Start: 2022-04-11 | End: 2022-04-12 | Stop reason: HOSPADM

## 2022-04-11 RX ADMIN — SODIUM CHLORIDE, PRESERVATIVE FREE 10 ML: 5 INJECTION INTRAVENOUS at 08:57

## 2022-04-11 RX ADMIN — SODIUM CHLORIDE, PRESERVATIVE FREE 10 ML: 5 INJECTION INTRAVENOUS at 08:59

## 2022-04-11 RX ADMIN — Medication 31.3 MILLICURIE: at 08:58

## 2022-04-11 RX ADMIN — SODIUM CHLORIDE, PRESERVATIVE FREE 10 ML: 5 INJECTION INTRAVENOUS at 07:38

## 2022-04-11 RX ADMIN — Medication 10.4 MILLICURIE: at 07:38

## 2022-04-11 RX ADMIN — REGADENOSON 0.4 MG: 0.08 INJECTION, SOLUTION INTRAVENOUS at 08:58

## 2022-04-11 NOTE — PROCEDURES
[address] [facility] [address] [address] [address]  [phone]                Pharmacologic Stress Nuclear Gated SPECT Study    Name: Jared Payan Account Number: [de-identified]    :  1945          Sex: female         Date of Study:  2022    Height: 5' 4\" (162.6 cm)         Weight: 160 lb (72.6 kg)     Ordering Provider: Sammie Asencio MD          PCP: Irene Taylor MD      Cardiologist: Sammie Asencio MD              Interpreting Physician: Joleen Fisher MD  _________________________________________________________________________________    Indication:   Detecting the presence and location of coronary artery disease    Clinical History:   Patient has no known history of coronary artery disease. Resting ECG:    Normal sinus rhythm, frequent premature ventricular complexes, nonspecific ST-T changes, abnormal EKG. Procedure:   Pharmacologic stress testing was performed with regadenoson 0.4 mg for 15 seconds. Additionally, low-level exercise was performed along with the infusion. The heart rate was 76 at baseline and rasta to 112 beats during the infusion. This corresponds to 79% of maximum predicted heart rate. The blood pressure at baseline was 110/80 and blood pressure at the end of infusion was 160/80. Blood pressure response was normal during the stress procedure. The patient experienced mild SOB and mid chest pressure which relieved with caffeine during the infusion. ECG during the infusion did not change. IMAGING: Myocardial perfusion imaging was performed at rest 30-35 minutes following the intravenous injection of 10.4 mCi of (Tc-Sestamibi) followed by 10 ml of Normal Saline. As per infusion protocol, the patient was injected intravenously with 31.3 mCi of (Tc-Sestamibi) followed by 10 ml of Normal Saline. Gated post-stress tomographic imaging was performed 20-25 minutes after stress.      FINDINGS: The overall quality of the study was good. Left ventricular cavity size was noted to be normal.    Rotational analog analysis demonstrated no patient motion or abnormal extracardiac radioactivity. The gated SPECT stress imaging in the short, vertical long, and horizontal long axis demonstrated abnormal tracer distribution in the myocardium. A mild defect was present in the distal anterior, distal anteroseptal and apical wall(s) that was  moderate sized by quantification. The resting images reveal partial reversibility. Gated SPECT left ventricular ejection fraction was calculated to be 40%, with normal myocardial thickening and wall motion and hypokinesis of the distal anterior  wall. TID ratio 1.21    Impression:    1. ECG during the infusion did not change. 2. The myocardial perfusion imaging was abnormal.    The abnormality was a a moderate sized partially reversible defect in the distal anterior, apical and anteroseptal walls suggestive of a prior infarct with ischemia. 3. Overall left ventricular systolic function was abnormal with regional wall motion abnormalities. 4. No transient ischemic dilatation  5. Intermediate risk general pharmacologic stress test.    Thank you for sending your patient to this [facility].      Electronically signed by Leonardo Adan MD on 4/11/22 at 4:36 PM EDT

## 2022-04-12 ENCOUNTER — TELEPHONE (OUTPATIENT)
Dept: CARDIOLOGY CLINIC | Age: 77
End: 2022-04-12

## 2022-04-12 DIAGNOSIS — R07.9 CHEST PAIN, UNSPECIFIED TYPE: ICD-10-CM

## 2022-04-12 DIAGNOSIS — Z01.810 PREOPERATIVE CARDIOVASCULAR EXAMINATION: Primary | ICD-10-CM

## 2022-04-12 NOTE — TELEPHONE ENCOUNTER
----- Message from Ale Briscoe MD sent at 4/12/2022  3:46 PM EDT -----  Discussed stress test results. Joint decision to proceed with cardiac catheterization in view of symptoms and EKG changes. Please schedule. (will need Coumadin held).

## 2022-04-18 ENCOUNTER — TELEPHONE (OUTPATIENT)
Dept: FAMILY MEDICINE CLINIC | Age: 77
End: 2022-04-18

## 2022-04-18 DIAGNOSIS — I10 ESSENTIAL HYPERTENSION: ICD-10-CM

## 2022-04-18 DIAGNOSIS — E78.5 HYPERLIPIDEMIA, UNSPECIFIED HYPERLIPIDEMIA TYPE: ICD-10-CM

## 2022-04-18 LAB
ALBUMIN SERPL-MCNC: 4.3 G/DL (ref 3.5–5.2)
ALP BLD-CCNC: 68 U/L (ref 35–104)
ALT SERPL-CCNC: 13 U/L (ref 0–32)
ANION GAP SERPL CALCULATED.3IONS-SCNC: 13 MMOL/L (ref 7–16)
AST SERPL-CCNC: 20 U/L (ref 0–31)
BASOPHILS ABSOLUTE: 0.01 E9/L (ref 0–0.2)
BASOPHILS RELATIVE PERCENT: 0.2 % (ref 0–2)
BILIRUB SERPL-MCNC: 0.3 MG/DL (ref 0–1.2)
BUN BLDV-MCNC: 21 MG/DL (ref 6–23)
CALCIUM SERPL-MCNC: 9.2 MG/DL (ref 8.6–10.2)
CHLORIDE BLD-SCNC: 107 MMOL/L (ref 98–107)
CHOLESTEROL, TOTAL: 150 MG/DL (ref 0–199)
CO2: 23 MMOL/L (ref 22–29)
CREAT SERPL-MCNC: 1.1 MG/DL (ref 0.5–1)
EOSINOPHILS ABSOLUTE: 0.05 E9/L (ref 0.05–0.5)
EOSINOPHILS RELATIVE PERCENT: 1.2 % (ref 0–6)
GFR AFRICAN AMERICAN: 58
GFR NON-AFRICAN AMERICAN: 48 ML/MIN/1.73
GLUCOSE BLD-MCNC: 96 MG/DL (ref 74–99)
HCT VFR BLD CALC: 33.8 % (ref 34–48)
HDLC SERPL-MCNC: 49 MG/DL
HEMOGLOBIN: 10.6 G/DL (ref 11.5–15.5)
IMMATURE GRANULOCYTES #: 0.01 E9/L
IMMATURE GRANULOCYTES %: 0.2 % (ref 0–5)
LDL CHOLESTEROL CALCULATED: 84 MG/DL (ref 0–99)
LYMPHOCYTES ABSOLUTE: 1.16 E9/L (ref 1.5–4)
LYMPHOCYTES RELATIVE PERCENT: 28.6 % (ref 20–42)
MCH RBC QN AUTO: 29 PG (ref 26–35)
MCHC RBC AUTO-ENTMCNC: 31.4 % (ref 32–34.5)
MCV RBC AUTO: 92.3 FL (ref 80–99.9)
MONOCYTES ABSOLUTE: 0.4 E9/L (ref 0.1–0.95)
MONOCYTES RELATIVE PERCENT: 9.9 % (ref 2–12)
NEUTROPHILS ABSOLUTE: 2.42 E9/L (ref 1.8–7.3)
NEUTROPHILS RELATIVE PERCENT: 59.9 % (ref 43–80)
PDW BLD-RTO: 14.3 FL (ref 11.5–15)
PLATELET # BLD: 157 E9/L (ref 130–450)
PMV BLD AUTO: 12.5 FL (ref 7–12)
POTASSIUM SERPL-SCNC: 4.5 MMOL/L (ref 3.5–5)
RBC # BLD: 3.66 E12/L (ref 3.5–5.5)
SODIUM BLD-SCNC: 143 MMOL/L (ref 132–146)
TOTAL PROTEIN: 6.7 G/DL (ref 6.4–8.3)
TRIGL SERPL-MCNC: 83 MG/DL (ref 0–149)
VLDLC SERPL CALC-MCNC: 17 MG/DL
WBC # BLD: 4.1 E9/L (ref 4.5–11.5)

## 2022-04-19 NOTE — TELEPHONE ENCOUNTER
Please put in note field for me to go over these labs with patient and family when they come in. Also make note for me to check vitamin B12 and iron levels at visit.

## 2022-04-21 ENCOUNTER — TELEPHONE (OUTPATIENT)
Dept: CARDIAC CATH/INVASIVE PROCEDURES | Age: 77
End: 2022-04-21

## 2022-04-21 NOTE — TELEPHONE ENCOUNTER
Reminded patient of scheduled procedure on 4/22 . Instructions given and COVID questionnaire completed.

## 2022-04-22 ENCOUNTER — HOSPITAL ENCOUNTER (OUTPATIENT)
Dept: CARDIAC CATH/INVASIVE PROCEDURES | Age: 77
Discharge: HOME | End: 2022-04-22
Attending: INTERNAL MEDICINE | Admitting: INTERNAL MEDICINE
Payer: MEDICARE

## 2022-04-22 VITALS
HEART RATE: 64 BPM | TEMPERATURE: 97.2 F | SYSTOLIC BLOOD PRESSURE: 106 MMHG | RESPIRATION RATE: 18 BRPM | HEIGHT: 65 IN | DIASTOLIC BLOOD PRESSURE: 42 MMHG | OXYGEN SATURATION: 94 % | WEIGHT: 160 LBS | BODY MASS INDEX: 26.66 KG/M2

## 2022-04-22 DIAGNOSIS — R94.39 ABNORMAL NUCLEAR STRESS TEST: ICD-10-CM

## 2022-04-22 LAB
ABO/RH: NORMAL
ANTIBODY SCREEN: NORMAL
INR BLD: 1.5
PROTHROMBIN TIME: 17.3 SEC (ref 9.3–12.4)

## 2022-04-22 PROCEDURE — 2580000003 HC RX 258: Performed by: INTERNAL MEDICINE

## 2022-04-22 PROCEDURE — C1894 INTRO/SHEATH, NON-LASER: HCPCS

## 2022-04-22 PROCEDURE — 36415 COLL VENOUS BLD VENIPUNCTURE: CPT

## 2022-04-22 PROCEDURE — 86901 BLOOD TYPING SEROLOGIC RH(D): CPT

## 2022-04-22 PROCEDURE — 93458 L HRT ARTERY/VENTRICLE ANGIO: CPT | Performed by: INTERNAL MEDICINE

## 2022-04-22 PROCEDURE — 85610 PROTHROMBIN TIME: CPT

## 2022-04-22 PROCEDURE — 2709999900 HC NON-CHARGEABLE SUPPLY

## 2022-04-22 PROCEDURE — 6360000002 HC RX W HCPCS

## 2022-04-22 PROCEDURE — 86900 BLOOD TYPING SEROLOGIC ABO: CPT

## 2022-04-22 PROCEDURE — 2500000003 HC RX 250 WO HCPCS

## 2022-04-22 PROCEDURE — 6370000000 HC RX 637 (ALT 250 FOR IP): Performed by: INTERNAL MEDICINE

## 2022-04-22 PROCEDURE — 93458 L HRT ARTERY/VENTRICLE ANGIO: CPT

## 2022-04-22 PROCEDURE — C1769 GUIDE WIRE: HCPCS

## 2022-04-22 PROCEDURE — 93567 NJX CAR CTH SPRVLV AORTGRPHY: CPT | Performed by: INTERNAL MEDICINE

## 2022-04-22 PROCEDURE — 93567 NJX CAR CTH SPRVLV AORTGRPHY: CPT

## 2022-04-22 PROCEDURE — 86850 RBC ANTIBODY SCREEN: CPT

## 2022-04-22 RX ORDER — SODIUM CHLORIDE 9 MG/ML
INJECTION, SOLUTION INTRAVENOUS ONCE
Status: COMPLETED | OUTPATIENT
Start: 2022-04-22 | End: 2022-04-22

## 2022-04-22 RX ORDER — TORSEMIDE 20 MG/1
10 TABLET ORAL DAILY
Qty: 90 TABLET | Refills: 1 | Status: SHIPPED | OUTPATIENT
Start: 2022-04-22

## 2022-04-22 RX ORDER — ASPIRIN 81 MG/1
324 TABLET, CHEWABLE ORAL ONCE
Status: COMPLETED | OUTPATIENT
Start: 2022-04-22 | End: 2022-04-22

## 2022-04-22 RX ADMIN — SODIUM CHLORIDE: 9 INJECTION, SOLUTION INTRAVENOUS at 12:31

## 2022-04-22 RX ADMIN — ASPIRIN 324 MG: 81 TABLET, CHEWABLE ORAL at 12:32

## 2022-04-22 ASSESSMENT — PAIN SCALES - GENERAL: PAINLEVEL_OUTOF10: 0

## 2022-04-22 NOTE — PROGRESS NOTES
At 1830 vasc band discontinued. Site soft. Cleaned with alcohol dsd applied with opsite drsg applied over that.radial pulse 2 plus. None

## 2022-04-22 NOTE — H&P
Department of Medicine  Section of Interventional Cardiology  Attending Procedure History and Physical        Pre-Procedural Diagnosis:  RDZ, abnormal stress test    Indications:  same    Procedure Planned:  CORS, LHC    History obtained from patient    History of Present Illness: The patient is a 65 y.o. female who presents for the above procedure. She is somewhat of a difficult historian. She reports dyspnea on exertion for the past few weeks with some orthopnea and PND but no lower extremity edema. Chest discomfort complaints are inconsistent. She has not had any palpitations or pathologic bleeding but reports frequent falling    A focused history review includes:  1. PAF  2. Hypertension  3. Ulcerative colitis  4. Restless leg syndromes  5.  Hyperlipidemia    Past Surgical History:        Procedure Laterality Date    BLADDER SUSPENSION  1978    BREAST LUMPECTOMY Left     x 2, negative    BUNIONECTOMY  1990    CARDIAC CATHETERIZATION  2/2012    CARPAL TUNNEL RELEASE Right     CATARACT REMOVAL WITH IMPLANT Right     COLONOSCOPY  2/2014    HYSTERECTOMY, TOTAL ABDOMINAL      non-cancerous    JOINT REPLACEMENT Bilateral     hip    KNEE ARTHROSCOPY Left     RECTOCELE REPAIR         Medications:    Current Outpatient Rx   Medication Sig Dispense Refill    lisinopril (PRINIVIL;ZESTRIL) 20 MG tablet Take 1 tablet by mouth daily 90 tablet 1    metoprolol succinate (TOPROL XL) 50 MG extended release tablet Take 1 tablet by mouth daily 90 tablet 3    escitalopram (LEXAPRO) 10 MG tablet Take 1 tablet by mouth daily 90 tablet 1    pravastatin (PRAVACHOL) 20 MG tablet Take 1 tablet by mouth daily 90 tablet 1    warfarin (COUMADIN) 5 MG tablet Take 1 tablet by mouth daily 90 tablet 1    warfarin (COUMADIN) 1 MG tablet Take 1 tablet by mouth daily 90 tablet 1    nitroGLYCERIN (NITROSTAT) 0.4 MG SL tablet Place 1 tablet under the tongue every 5 minutes as needed for Chest pain up to max of 3 total doses.  If no relief after 1 dose, call 911. 25 tablet 0    Cyanocobalamin (B-12) 1000 MCG TABS Take by mouth daily      MELATONIN ER PO Take by mouth nightly      Propylene Glycol-Glycerin (SOOTHE OP) Apply to eye      vitamin D 25 MCG (1000 UT) CAPS Take by mouth daily       omeprazole (PRILOSEC) 20 MG delayed release capsule Take 20 mg by mouth daily       Biotin 04131 MCG TABS Take by mouth daily          Allergies:  Erythromycin, Penicillin g, Azithromycin, Penicillins, and Propoxyphene    History of allergic reaction to anesthesia:  no    Social History  Social History     Tobacco History     Smoking Status  Never Smoker    Smokeless Tobacco Use  Never Used          Alcohol History     Alcohol Use Status  Yes Drinks/Week  0 Standard drinks or equivalent per week Comment  occasionally          Drug Use     Drug Use Status  No          Sexual Activity     Sexually Active  Not Asked                  REVIEW OF SYSTEMS  General ROS: No fevers, chills, unintentional weight loss  Ophthalmic ROS: No new diplopia, blurred vision  ENT ROS: negative for - epistaxis, headaches, sore throat or vocal changes  Hematological and Lymphatic ROS: negative for - bleeding problems, blood clots, blood transfusions or swollen lymph nodes  Cardiovascular ROS: As in HPI  Gastrointestinal ROS: no abdominal pain, change in bowel habits, or black or bloody stools  Genito-Urinary ROS: no dysuria, trouble voiding, or hematuria  Musculoskeletal ROS: negative for - gait disturbance, joint pain, joint swelling, muscle pain or muscular weakness  Neurological ROS: no TIA or stroke symptoms  Dermatological ROS: negative for - hair changes, pruritus or rash    PHYSICAL EXAM    BP (!) 174/69   Pulse 71   Temp 98.7 °F (37.1 °C) (Temporal)   Resp 18   Ht 5' 5\" (1.651 m)   Wt 160 lb (72.6 kg)   SpO2 98%   BMI 26.63 kg/m²   Nutritional Status:  Normal    General: No acute distress, appears his stated age, nonicteric  Head: Atraumatic, no gross abnormalities or bruises  Neck: Supple and nontender, no carotid bruits, no JVP  Lungs: Clear to auscultation bilaterally, no wheezes, rales, or rhonchi  Heart: Regular rate and rhythm, no murmurs, rubs, or gallops  Abdomen: Soft, nontender, nondistended, normal bowel sounds  Extremities: No obvious deformities, no cyanosis, no edema  Neurological: Alert and oriented x3, EOMI, moving all extremities x4  Psychological: Normal mood and affect, cooperative  Skin: Color, texture, and turgor normal for age      DATA    CBC:    Lab Results   Component Value Date    WBC 4.1 04/18/2022    RBC 3.66 04/18/2022    HGB 10.6 04/18/2022    HCT 33.8 04/18/2022    MCV 92.3 04/18/2022    RDW 14.3 04/18/2022     04/18/2022       Lab Results   Component Value Date     04/18/2022    K 4.5 04/18/2022     04/18/2022    CO2 23 04/18/2022    BUN 21 04/18/2022    CREATININE 1.1 04/18/2022    CREATININE 0.8 03/21/2019    GLUCOSE 96 04/18/2022    CALCIUM 9.2 04/18/2022        Lab Results   Component Value Date    LABALBU 4.3 04/18/2022     Lab Results   Component Value Date    ALT 13 04/18/2022    AST 20 04/18/2022    ALKPHOS 68 04/18/2022    BILITOT 0.3 04/18/2022       PT/INR:  No results found for: PTINR  PTT:    Lab Results   Component Value Date    APTT 31.7 02/10/2021         ASSESSMENT AND PLAN    1. Patient is a 65 y.o. female with above specified procedure planned CORS. [facility]. Expected Sedation/Anesthesia Type:  conscious sedation    2. ASA ([address] Anesthesiology) Anesthesia Status: 3    3. Procedure options, risks and benefits reviewed with Patient. Patient expresses understanding    4.   Consent has been signed:  Yes    Francisco Cisse MD

## 2022-04-22 NOTE — PROGRESS NOTES
Post cardiac cath wrist instructions reviewed with patient and handout given.  Discharge instructions and meds reviewed with patient and daughter

## 2022-04-22 NOTE — PROCEDURES
CARDIAC CATHETERIZATION REPORT    : Elvin Salazar MD     Date of procedure: 22     Indication:  1. RDZ, abnormal stress test    Procedures:  Left heart catheterization, Coronary angiogram, Left ventriculogram and Ascending aortogram     Sedation/analgesia:  Midazolam IV and Fentanyl IV     Time sedation was administered: 1458. I was present in the room when sedation was administered. Procedure end time: 1523  Time spent with face to face monitoring of moderate sedation: 25 minutes    Brief history:  43-year-old  female with PAF on warfarin, hypertension, ulcerative colitis, frequent falls and likely mild cognitive insufficiency who has been having dyspnea on exertion for the past few weeks to a couple of months. She underwent a Lexiscan stress MPI which demonstrated a partially reversible defect in the distal anterior, anteroseptal and apical walls. There were no associated wall motion abnormalities. Description of procedure: The patient presented to the Cath Lab in a(n) elective fashion. The patient was prepped and draped in a sterile manner. Timeout, airway and ASA assessment were completed. Local anesthesia with 2% lidocaine was administered and sedation/analgesia were administered as above. Access was obtained using the modified Seldinger technique and a micropuncture needle in the right radial artery. A 6F slender sheath was inserted over a wire. Diagnostic angiography was performed using 5F JL3.5, JR4.0 and PIG diagnostic catheters. Coronary anatomy:  Dominance: Right  1. Left main: Mild diffuse disease  2. Left anterior descendin% eccentric ostial stenosis. The LAD is a large vessel that covers the apex and gives rise to multiple small to medium sized diagonals. The rest of the LAD has minimal luminal irregularities. 3. Ramus intermedius: Diminutive  4. Left circumflex: This is a large vessel with large bifurcating OM1 and a large OM 2.   OM2 has a focal 50% stenosis  5. Right coronary artery: This is a large dominant vessel with a medium caliber RPDA and large RPL1 and large RPL2. The proximal through distal RCA has mild diffuse disease      Hemodynamics:  LVEDP 17  Ao: 151/51 with a mean of 77        Hemostasis:  Transradial band was applied for patent arterial hemostasis. Complications: None  Estimated blood loss: Less than 10 mL  Air kerma: 252 mGy  Contrast used: 80 mL    Conclusions:  1. Mild to moderate nonobstructive CAD as detailed above  2. Elevated left filling pressure  3. The left ventricle appears mildly dilated by single-plane ventriculography and the LV systolic function appears on the low end of normal.  There was no significant mitral regurgitation. 4. The ascending aorta is of normal caliber and there is 2-3+ aortic insufficiency      Recommendations:  1. High intensity statin  2. DARREN to evaluate her aortic valve and LV size and systolic function  3.  Start torsemide 10 mg daily and follow-up with Dr. Benoit Cochran in 2 to 4 weeks      Rachel David MD, [facility]  Interventional Cardiology/Structural Heart Disease  Office: [phone]  Coordinator: Meli Pérez

## 2022-04-25 ENCOUNTER — TELEPHONE (OUTPATIENT)
Dept: CARDIOLOGY CLINIC | Age: 77
End: 2022-04-25

## 2022-04-29 ENCOUNTER — OFFICE VISIT (OUTPATIENT)
Dept: FAMILY MEDICINE CLINIC | Age: 77
End: 2022-04-29
Payer: MEDICARE

## 2022-04-29 VITALS
SYSTOLIC BLOOD PRESSURE: 126 MMHG | BODY MASS INDEX: 25.76 KG/M2 | DIASTOLIC BLOOD PRESSURE: 66 MMHG | TEMPERATURE: 97.1 F | OXYGEN SATURATION: 97 % | HEIGHT: 65 IN | WEIGHT: 154.6 LBS | HEART RATE: 64 BPM

## 2022-04-29 DIAGNOSIS — I48.0 PAF (PAROXYSMAL ATRIAL FIBRILLATION) (HCC): ICD-10-CM

## 2022-04-29 DIAGNOSIS — D64.9 ANEMIA, UNSPECIFIED TYPE: ICD-10-CM

## 2022-04-29 DIAGNOSIS — Z79.01 ANTICOAGULATED: ICD-10-CM

## 2022-04-29 DIAGNOSIS — E78.5 HYPERLIPIDEMIA, UNSPECIFIED HYPERLIPIDEMIA TYPE: ICD-10-CM

## 2022-04-29 DIAGNOSIS — E53.8 VITAMIN B 12 DEFICIENCY: ICD-10-CM

## 2022-04-29 DIAGNOSIS — I10 ESSENTIAL HYPERTENSION: ICD-10-CM

## 2022-04-29 DIAGNOSIS — J30.9 ALLERGIC RHINITIS, UNSPECIFIED SEASONALITY, UNSPECIFIED TRIGGER: ICD-10-CM

## 2022-04-29 DIAGNOSIS — F32.5 MAJOR DEPRESSIVE DISORDER WITH SINGLE EPISODE, IN REMISSION (HCC): ICD-10-CM

## 2022-04-29 DIAGNOSIS — I25.118 CORONARY ARTERY DISEASE OF NATIVE ARTERY OF NATIVE HEART WITH STABLE ANGINA PECTORIS (HCC): ICD-10-CM

## 2022-04-29 LAB
INTERNATIONAL NORMALIZATION RATIO, POC: 2
PROTHROMBIN TIME, POC: 23.9

## 2022-04-29 PROCEDURE — 1036F TOBACCO NON-USER: CPT | Performed by: INTERNAL MEDICINE

## 2022-04-29 PROCEDURE — G8427 DOCREV CUR MEDS BY ELIG CLIN: HCPCS | Performed by: INTERNAL MEDICINE

## 2022-04-29 PROCEDURE — 85610 PROTHROMBIN TIME: CPT | Performed by: INTERNAL MEDICINE

## 2022-04-29 PROCEDURE — 1090F PRES/ABSN URINE INCON ASSESS: CPT | Performed by: INTERNAL MEDICINE

## 2022-04-29 PROCEDURE — 99214 OFFICE O/P EST MOD 30 MIN: CPT | Performed by: INTERNAL MEDICINE

## 2022-04-29 PROCEDURE — G8417 CALC BMI ABV UP PARAM F/U: HCPCS | Performed by: INTERNAL MEDICINE

## 2022-04-29 PROCEDURE — 1123F ACP DISCUSS/DSCN MKR DOCD: CPT | Performed by: INTERNAL MEDICINE

## 2022-04-29 PROCEDURE — 4040F PNEUMOC VAC/ADMIN/RCVD: CPT | Performed by: INTERNAL MEDICINE

## 2022-04-29 PROCEDURE — G8400 PT W/DXA NO RESULTS DOC: HCPCS | Performed by: INTERNAL MEDICINE

## 2022-04-29 RX ORDER — MONTELUKAST SODIUM 10 MG/1
10 TABLET ORAL DAILY
Qty: 30 TABLET | Refills: 5 | Status: SHIPPED
Start: 2022-04-29 | End: 2022-06-23 | Stop reason: SDUPTHER

## 2022-04-29 RX ORDER — ATORVASTATIN CALCIUM 40 MG/1
40 TABLET, FILM COATED ORAL DAILY
Qty: 90 TABLET | Refills: 1 | Status: SHIPPED
Start: 2022-04-29 | End: 2022-08-31 | Stop reason: SDUPTHER

## 2022-04-29 RX ORDER — ATORVASTATIN CALCIUM 40 MG/1
40 TABLET, FILM COATED ORAL DAILY
Qty: 90 TABLET | Refills: 1 | Status: SHIPPED
Start: 2022-04-29 | End: 2022-04-29

## 2022-04-30 ENCOUNTER — PATIENT MESSAGE (OUTPATIENT)
Dept: FAMILY MEDICINE CLINIC | Age: 77
End: 2022-04-30

## 2022-04-30 NOTE — PROGRESS NOTES
[address] [place]     22  Mark Carias : 1945 Sex: female  Age: 65 y.o. Chief Complaint   Patient presents with    Office Visit for Anticoagulation Management     1 month inr; discuss labs with pt and family; recheck b12 & iron level       HPI    Patient presents today with her daughter for 1 month follow-up visit and anticoagulation management/INR check. INR today was 2.0. She is on 6 mg of Coumadin daily and I asked her to stay on same dose and we will recheck this again in 1 month at her regular 3-month visit. We did increase her lisinopril from 10 to 20 mg daily last visit and she tells me blood pressures have improved at home in the 969 and 487 systolic range. Diastolic is been good. I asked her to continue that as well. Since I have seen her she has had a stress test which did come back positive. And she subsequently ended up having a heart catheterization which I did review the report and the cardiology follow-up note. This did demonstrate mild to moderate nonobstructive CAD with LV systolic function at the low end of normal.  They do mention also 2-3+ aortic insufficiency. They do recommend high intensity statin. Apparently they did not initiate that at that time. They are also talking about DARREN to evaluate her aortic valve and LV size and systolic function. They also initiated furosemide 10 mg daily. She does have follow-up with Dr. Indy Collins in the next couple weeks. She states that they are going to discuss watchman procedure. Her back symptoms have been perhaps a little bit better. Still is not interested in any referral.  Her depression has been stable and controlled on her SSRI. GERD has been stable and controlled on her PPI. She does mention that her allergy symptoms have been rather significant and not well controlled and is asking if there is anything else we can give her.   I did discussed azelastine and also possible Singulair and we have elected for the latter as a trial.  .She follows with GI, orthopedics and now cardiology        Review of Systems     Constitutional: Negative for activity change, appetite change, chills, diaphoresis, , fever and unexpected weight change.    HENT: Negative  Respiratory: Negative for cough,  and wheezing.   Positive for dyspnea on exertion-improved  Cardiovascular: Negative for palpitations and leg swelling.  She did have recent positive stress test and recent heart catheterization-see above.-recent PAF on anticoagulation  gastrointestinal: Negative for abdominal pain, blood in stool, constipation, diarrhea, nausea and vomiting.  Does have history of ulcerative colitis. -Quiescent at this point  Endocrine: Negative.    Genitourinary:  Negative for difficulty urinating, dysuria, frequency and hematuria.    No longer seeing urology  Musculoskeletal: Positive for arthralgias and back pain-see above. Negative for myalgias.       Skin: Negative.    Allergic/Immunologic: Negative for environmental allergies and immunocompromised state. Neurological: Negative for, weakness, light-headedness, numbness and headaches. Hematological: Negative. psychiatric/Behavioral:        Depression has improved  on her SSRI         REST OF PERTINENT ROS GONE OVER AND WAS NEGATIVE.      PMH:  Health Maintenance:  Mammogram - (7/9/2018)  Mammogram Screening - (7/9/2018)  Influenza Vaccination - (9/20/2018)  Couseled on Home Safety - (2/26/2018)  Colonoscopy - (1/16/2018), 8/21-due 26  Colonoscopy Screening - (1/16/2018)  Bone Density Scan - (9/29/2016)  Pneumonia Vaccination - (9/22/2016)  Tetanus Immunization - (9/22/2016)  pt cannot recall if she had this vaccination, no record from pharmacy  Stress Test - 11/11,9/15, 7/21-negative, 4/22-positive  heart cath - 2/12-ok, 4/22-mild to moderate nonobstructive CAD  Colonoscopy - 2/14,8/14,1/18-due 21  EKG - 3/14, 11/17  has Gyn - Dr. Lionel Steel  2D ECHO - 9/15  Hemmocult Cards - 10/16-neg x 3  EGD - 1/18, 10/19,-due , -due   Prevnar Vaccine - (11/3/2015) [facility]  Medical Problems:  Hypertension, Restless Leg Syndrome, irritable bladder, Depression  Ulcerative Colitis - follows with dr Vilma Grayson  Anemia - follows with dr Raquel Rod Polyps, over active bladder, vit D defic, chronic sinus congestion, Osteoarthritis, Diverticulosis, fx  right wrist, Pfo, Hyperlipidemia, Diastolic Dysfunction, Cholelithiasis, fracture right lateral malleolus,  Valvular Heart Disease, Lumbar DDD, Lumbar Spinal Stenosis, Lumbar herniated disc  Paroxysmal atrial fibrillation--anticoagulated with warfarin  CAD (mild to moderate nonobstructive) per heart cath-     Surgical Hx:  DEON - Non-cancerous  Left breast mass removal - x2-benign  Right carpal tunnel surgery, Rectocele repair, Left knee arthroscopy, Bilateral total hip arthroplasty,  Bladder suspension, Bunion surgery, Bilateral cataract surgery, Repair of torn meniscus left knee,  Posterior-lateral fusion L2-5    Right total knee arthroplasty-  Left total knee arthroplasty-   old records reviewed  FH:  Father:  Heart Disease -  age 80. Mother:  Non Insulin Dependent Diabetes -  age84  Congestive Heart Failure (CHF). Brother 1:  Obesity, Peripheral Vascular Disease (PVD). Maternal Grandmother:  Breast Cancer. SH:  Marital: Legal Status: . retired   Personal Habits: Cigarette Use: Does Not Smoke. Alcohol: Denies alcohol use             Current Outpatient Medications:     montelukast (SINGULAIR) 10 MG tablet, Take 1 tablet by mouth daily, Disp: 30 tablet, Rfl: 5    atorvastatin (LIPITOR) 40 MG tablet, Take 1 tablet by mouth daily, Disp: 90 tablet, Rfl: 1    torsemide (DEMADEX) 20 MG tablet, Take 0.5 tablets by mouth daily, Disp: 90 tablet, Rfl: 1    lisinopril (PRINIVIL;ZESTRIL) 20 MG tablet, Take 1 tablet by mouth daily, Disp: 90 tablet, Rfl: 1    metoprolol succinate (TOPROL XL) 50 MG extended release tablet, Take 1 tablet by mouth daily, Disp: 90 tablet, Rfl: 3    escitalopram (LEXAPRO) 10 MG tablet, Take 1 tablet by mouth daily, Disp: 90 tablet, Rfl: 1    pravastatin (PRAVACHOL) 20 MG tablet, Take 1 tablet by mouth daily, Disp: 90 tablet, Rfl: 1    warfarin (COUMADIN) 5 MG tablet, Take 1 tablet by mouth daily, Disp: 90 tablet, Rfl: 1    warfarin (COUMADIN) 1 MG tablet, Take 1 tablet by mouth daily, Disp: 90 tablet, Rfl: 1    nitroGLYCERIN (NITROSTAT) 0.4 MG SL tablet, Place 1 tablet under the tongue every 5 minutes as needed for Chest pain up to max of 3 total doses.  If no relief after 1 dose, call 911., Disp: 25 tablet, Rfl: 0    Cyanocobalamin (B-12) 1000 MCG TABS, Take by mouth daily, Disp: , Rfl:     MELATONIN ER PO, Take by mouth nightly, Disp: , Rfl:     Propylene Glycol-Glycerin (SOOTHE OP), Apply to eye, Disp: , Rfl:     Cholecalciferol 50 MCG (2000 UT) CAPS, Take by mouth daily , Disp: , Rfl:     omeprazole (PRILOSEC) 20 MG delayed release capsule, Take 20 mg by mouth daily , Disp: , Rfl:     Biotin 5000 MCG TABS, Take by mouth daily , Disp: , Rfl:   Allergies   Allergen Reactions    Erythromycin Hives    Penicillin G     Azithromycin     Penicillins Hives    Propoxyphene        Past Medical History:   Diagnosis Date    Anemia     Cholelithiasis     Chronic congestion of paranasal sinus     Colon polyps     Depression     Diastolic dysfunction     Diverticulosis     History of blood transfusion     HTN (hypertension)     Hyperlipidemia 06/20/2019    Irritable bladder     Lumbar degenerative disc disease     Lumbar herniated disc     Lumbar spinal stenosis     Major depressive disorder with single episode, in remission ([facility] [address] 06/20/2019    Osteoarthritis     Overactive bladder     RLS (restless legs syndrome) 06/20/2019    Ulcerative colitis ([facility] [address]     Ulcerative colitis without complications ([facility] [address] 69/62/6533    Valvular heart disease     Vitamin D deficiency     Wrist fracture     Right Past Surgical History:   Procedure Laterality Date    BLADDER SUSPENSION      BREAST LUMPECTOMY Left     x 2, negative    BUNIONECTOMY      CARDIAC CATHETERIZATION  2012    CARPAL TUNNEL RELEASE Right     CATARACT REMOVAL WITH IMPLANT Right     COLONOSCOPY  2014    HYSTERECTOMY, TOTAL ABDOMINAL      non-cancerous    JOINT REPLACEMENT Bilateral     hip    KNEE ARTHROSCOPY Left     RECTOCELE REPAIR       Family History   Problem Relation Age of Onset    Heart Disease Father          age 80    Stroke Father    Zannie Cowden Diabetes Mother          age 80    Heart Disease Mother         CHF    Other Brother         PVD, obesity    Heart Disease Brother         CHF    Cancer Paternal Grandmother         breast     Social History     Socioeconomic History    Marital status:      Spouse name: Not on file    Number of children: Not on file    Years of education: Not on file    Highest education level: Not on file   Occupational History    Not on file   Tobacco Use    Smoking status: Never Smoker    Smokeless tobacco: Never Used   Vaping Use    Vaping Use: Never used   Substance and Sexual Activity    Alcohol use: Yes     Comment: occasionally    Drug use: No    Sexual activity: Not on file   Other Topics Concern    Not on file   Social History Narrative    Not on file     Social Determinants of Health     Financial Resource Strain: Low Risk     Difficulty of Paying Living Expenses: Not hard at all   Food Insecurity: No Food Insecurity    Worried About [facility] in the Last Year: Never true    [facility] in the Last Year: Never true   Transportation Needs:     Lack of Transportation (Medical): Not on file    Lack of Transportation (Non-Medical):  Not on file   Physical Activity: Insufficiently Active    Days of Exercise per Week: 3 days    Minutes of Exercise per Session: 30 min   Stress:     Feeling of Stress : Not on file   Social Connections:     Frequency of Communication with Friends and Family: Not on file    Frequency of Social Gatherings with Friends and Family: Not on file    Attends Yarsanism Services: Not on file    Active Member of Clubs or Organizations: Not on file    Attends Club or Organization Meetings: Not on file    Marital Status: Not on file   Intimate Partner Violence:     Fear of Current or Ex-Partner: Not on file    Emotionally Abused: Not on file    Physically Abused: Not on file    Sexually Abused: Not on file   Housing Stability:     Unable to Pay for Housing in the Last Year: Not on file    Number of [place] in the Last Year: Not on file    Unstable Housing in the Last Year: Not on file       Vitals:    04/29/22 1611   BP: 126/66   Pulse: 64   Temp: 97.1 °F (36.2 °C)   TempSrc: Temporal   SpO2: 97%   Weight: 154 lb 9.6 oz (70.1 kg)   Height: 5' 5\" (1.651 m)       Physical Exam    Constitutional: She is oriented to person, place, and time. She appears well-developed and well-nourished. No distress.   Neck: Normal range of motion. Neck supple. No JVD present. No thyromegaly present. Cardiovascular: Normal rate, regular rhythm, normal heart sounds and intact distal pulses. Exam reveals no gallop and no friction rub.   No murmur heard. Pulmonary/Chest: Effort normal and breath sounds normal. No respiratory distress. She has no wheezes. She has no rales. Abdominal: Soft. Bowel sounds are normal. She exhibits no distension and no mass. There is no tenderness. Musculoskeletal: Normal range of motion. She exhibits no edema.  Palpation to her spine/paraspinal region-unable to elicit pain at this time. Neurological: She is alert and oriented to person, place, and time. She displays normal reflexes. No sensory deficit. She exhibits normal muscle tone. Coordination normal.   Skin: Skin is warm and dry. No rash noted. No erythema. Psychiatric: She has a normal mood and affect.  Her behavior is normal.   Nursing note and vitals reviewed           Assessment and Plan:  Deana David was seen today for office visit for anticoagulation management. Diagnoses and all orders for this visit:    Essential hypertension  -     Basic Metabolic Panel; Future    Anticoagulated  -     POCT INR    PAF (paroxysmal atrial fibrillation) (HCC)    Major depressive disorder with single episode, in remission ([facility] [address]    Hyperlipidemia, unspecified hyperlipidemia type  -     Lipid Panel; Future  -     AST; Future  -     ALT; Future    Coronary artery disease of native artery of native heart with stable angina pectoris (HCC)    Vitamin B 12 deficiency  -     Vitamin B12; Future    Anemia, unspecified type  -     Iron and TIBC; Future  -     Ferritin; Future  -     Hemoglobin and Hematocrit; Future    Allergic rhinitis, unspecified seasonality, unspecified trigger    Other orders  -     Discontinue: atorvastatin (LIPITOR) 40 MG tablet; Take 1 tablet by mouth daily  -     montelukast (SINGULAIR) 10 MG tablet; Take 1 tablet by mouth daily  -     atorvastatin (LIPITOR) 40 MG tablet; Take 1 tablet by mouth daily    Plan: I will see patient back in 1 month for INR check and regular visit. Continue same dose Coumadin. See above body report. Continue to monitor blood pressure closely. Follow with above consultants including cardiology in the next couple weeks. We will initiate Singulair as a trial for her allergy symptoms. We will check B12 and iron labs as well as others before next visit-order is placed. .  I did stop her pravastatin and start atorvastatin for better vascular protection. She will apparently have upcoming DARREN and watchman discussion with cardiology. Prescription management performed. Notify us with problems in the interim. Return in about 1 month (around 5/29/2022). Seen By:  Lindsay Guevara MD      *Document was created using voice recognition software. Note was reviewed however may contain grammatical errors.

## 2022-05-02 RX ORDER — MONTELUKAST SODIUM 10 MG/1
10 TABLET ORAL DAILY
Qty: 90 TABLET | OUTPATIENT
Start: 2022-05-02

## 2022-05-02 NOTE — TELEPHONE ENCOUNTER
Spoke with YAMILETH [facility] with her daughter and answered questions. Stop pravastatin continue Lipitor and get blood work a couple days before next visit fasting. If she has further questions to call.

## 2022-05-02 NOTE — TELEPHONE ENCOUNTER
From: Nicole Tarango  To: Dr. Cora Warren: 4/30/2022 4:18 PM EDT  Subject: Meds and blood wirk    Mom was in on Friday with my sister. Dr Nina Holland is starting her on Lipitor. Am I to hold back her prevstatin? Its still listed on her chart as to continue taking. Also my sister mentioned blood work. Did you send that order in? She didnt come home with anything. I think we will just go to lab by your office. Its easier for her to get in and out physically. Are they open on Saturday? Do you just need done prior to appt on 23rd?

## 2022-05-17 DIAGNOSIS — I10 ESSENTIAL HYPERTENSION: ICD-10-CM

## 2022-05-17 DIAGNOSIS — R07.9 CHEST PAIN, UNSPECIFIED TYPE: ICD-10-CM

## 2022-05-17 DIAGNOSIS — E78.5 HYPERLIPIDEMIA, UNSPECIFIED HYPERLIPIDEMIA TYPE: ICD-10-CM

## 2022-05-17 DIAGNOSIS — D64.9 ANEMIA, UNSPECIFIED TYPE: ICD-10-CM

## 2022-05-17 DIAGNOSIS — Z01.810 PREOPERATIVE CARDIOVASCULAR EXAMINATION: ICD-10-CM

## 2022-05-17 DIAGNOSIS — E53.8 VITAMIN B 12 DEFICIENCY: ICD-10-CM

## 2022-05-17 LAB
ALBUMIN SERPL-MCNC: 4.4 G/DL (ref 3.5–5.2)
ALP BLD-CCNC: 80 U/L (ref 35–104)
ALT SERPL-CCNC: 8 U/L (ref 0–32)
ANION GAP SERPL CALCULATED.3IONS-SCNC: 14 MMOL/L (ref 7–16)
AST SERPL-CCNC: 16 U/L (ref 0–31)
BILIRUB SERPL-MCNC: 0.3 MG/DL (ref 0–1.2)
BUN BLDV-MCNC: 24 MG/DL (ref 6–23)
CALCIUM SERPL-MCNC: 9.2 MG/DL (ref 8.6–10.2)
CHLORIDE BLD-SCNC: 105 MMOL/L (ref 98–107)
CHOLESTEROL, TOTAL: 132 MG/DL (ref 0–199)
CO2: 27 MMOL/L (ref 22–29)
CREAT SERPL-MCNC: 1.1 MG/DL (ref 0.5–1)
FERRITIN: 151 NG/ML
GFR AFRICAN AMERICAN: 58
GFR NON-AFRICAN AMERICAN: 48 ML/MIN/1.73
GLUCOSE BLD-MCNC: 92 MG/DL (ref 74–99)
HCT VFR BLD CALC: 33.9 % (ref 34–48)
HDLC SERPL-MCNC: 46 MG/DL
HEMOGLOBIN: 10.8 G/DL (ref 11.5–15.5)
INR BLD: 3.7
IRON SATURATION: 17 % (ref 15–50)
IRON: 45 MCG/DL (ref 37–145)
LDL CHOLESTEROL CALCULATED: 70 MG/DL (ref 0–99)
MCH RBC QN AUTO: 28.8 PG (ref 26–35)
MCHC RBC AUTO-ENTMCNC: 31.9 % (ref 32–34.5)
MCV RBC AUTO: 90.4 FL (ref 80–99.9)
PDW BLD-RTO: 13.4 FL (ref 11.5–15)
PLATELET # BLD: 163 E9/L (ref 130–450)
PMV BLD AUTO: 11.3 FL (ref 7–12)
POTASSIUM SERPL-SCNC: 3.7 MMOL/L (ref 3.5–5)
PROTHROMBIN TIME: 40 SEC (ref 9.3–12.4)
RBC # BLD: 3.75 E12/L (ref 3.5–5.5)
SODIUM BLD-SCNC: 146 MMOL/L (ref 132–146)
TOTAL IRON BINDING CAPACITY: 263 MCG/DL (ref 250–450)
TOTAL PROTEIN: 7 G/DL (ref 6.4–8.3)
TRIGL SERPL-MCNC: 78 MG/DL (ref 0–149)
VITAMIN B-12: 1126 PG/ML (ref 211–946)
VLDLC SERPL CALC-MCNC: 16 MG/DL
WBC # BLD: 5.9 E9/L (ref 4.5–11.5)

## 2022-05-17 PROCEDURE — 36415 COLL VENOUS BLD VENIPUNCTURE: CPT | Performed by: INTERNAL MEDICINE

## 2022-05-23 ENCOUNTER — OFFICE VISIT (OUTPATIENT)
Dept: FAMILY MEDICINE CLINIC | Age: 77
End: 2022-05-23
Payer: MEDICARE

## 2022-05-23 VITALS
TEMPERATURE: 97.3 F | DIASTOLIC BLOOD PRESSURE: 78 MMHG | HEIGHT: 65 IN | SYSTOLIC BLOOD PRESSURE: 156 MMHG | BODY MASS INDEX: 25.92 KG/M2 | OXYGEN SATURATION: 100 % | WEIGHT: 155.6 LBS | HEART RATE: 56 BPM

## 2022-05-23 DIAGNOSIS — I10 PRIMARY HYPERTENSION: ICD-10-CM

## 2022-05-23 DIAGNOSIS — Z79.01 ANTICOAGULATED: ICD-10-CM

## 2022-05-23 DIAGNOSIS — F32.5 MAJOR DEPRESSIVE DISORDER WITH SINGLE EPISODE, IN REMISSION (HCC): ICD-10-CM

## 2022-05-23 DIAGNOSIS — J01.90 ACUTE SINUSITIS, RECURRENCE NOT SPECIFIED, UNSPECIFIED LOCATION: Primary | ICD-10-CM

## 2022-05-23 DIAGNOSIS — E78.5 HYPERLIPIDEMIA, UNSPECIFIED HYPERLIPIDEMIA TYPE: ICD-10-CM

## 2022-05-23 DIAGNOSIS — I48.0 PAF (PAROXYSMAL ATRIAL FIBRILLATION) (HCC): ICD-10-CM

## 2022-05-23 DIAGNOSIS — I25.118 CORONARY ARTERY DISEASE OF NATIVE ARTERY OF NATIVE HEART WITH STABLE ANGINA PECTORIS (HCC): ICD-10-CM

## 2022-05-23 LAB
INTERNATIONAL NORMALIZATION RATIO, POC: 4.3
PROTHROMBIN TIME, POC: 51.9

## 2022-05-23 PROCEDURE — G8417 CALC BMI ABV UP PARAM F/U: HCPCS | Performed by: INTERNAL MEDICINE

## 2022-05-23 PROCEDURE — 1123F ACP DISCUSS/DSCN MKR DOCD: CPT | Performed by: INTERNAL MEDICINE

## 2022-05-23 PROCEDURE — 99214 OFFICE O/P EST MOD 30 MIN: CPT | Performed by: INTERNAL MEDICINE

## 2022-05-23 PROCEDURE — 1090F PRES/ABSN URINE INCON ASSESS: CPT | Performed by: INTERNAL MEDICINE

## 2022-05-23 PROCEDURE — 1036F TOBACCO NON-USER: CPT | Performed by: INTERNAL MEDICINE

## 2022-05-23 PROCEDURE — 85610 PROTHROMBIN TIME: CPT | Performed by: INTERNAL MEDICINE

## 2022-05-23 PROCEDURE — G8400 PT W/DXA NO RESULTS DOC: HCPCS | Performed by: INTERNAL MEDICINE

## 2022-05-23 PROCEDURE — G8427 DOCREV CUR MEDS BY ELIG CLIN: HCPCS | Performed by: INTERNAL MEDICINE

## 2022-05-23 RX ORDER — DOXYCYCLINE HYCLATE 100 MG
100 TABLET ORAL 2 TIMES DAILY
Qty: 14 TABLET | Refills: 0 | Status: SHIPPED | OUTPATIENT
Start: 2022-05-23 | End: 2022-05-30

## 2022-05-23 RX ORDER — LISINOPRIL 30 MG/1
30 TABLET ORAL DAILY
Qty: 30 TABLET | Refills: 5 | Status: SHIPPED
Start: 2022-05-23 | End: 2022-05-23

## 2022-05-23 RX ORDER — LISINOPRIL 30 MG/1
30 TABLET ORAL DAILY
Qty: 30 TABLET | Refills: 5 | Status: SHIPPED
Start: 2022-05-23 | End: 2022-05-24

## 2022-05-23 RX ORDER — AZELASTINE 1 MG/ML
1 SPRAY, METERED NASAL 2 TIMES DAILY
Qty: 60 ML | Refills: 1 | Status: SHIPPED
Start: 2022-05-23 | End: 2022-06-23 | Stop reason: SDUPTHER

## 2022-05-24 ENCOUNTER — OFFICE VISIT (OUTPATIENT)
Dept: CARDIOLOGY CLINIC | Age: 77
End: 2022-05-24
Payer: MEDICARE

## 2022-05-24 VITALS
BODY MASS INDEX: 29.45 KG/M2 | SYSTOLIC BLOOD PRESSURE: 136 MMHG | WEIGHT: 156 LBS | DIASTOLIC BLOOD PRESSURE: 68 MMHG | HEART RATE: 67 BPM | HEIGHT: 61 IN | RESPIRATION RATE: 16 BRPM

## 2022-05-24 DIAGNOSIS — I35.1 NONRHEUMATIC AORTIC VALVE INSUFFICIENCY: Primary | ICD-10-CM

## 2022-05-24 DIAGNOSIS — I48.0 PAF (PAROXYSMAL ATRIAL FIBRILLATION) (HCC): ICD-10-CM

## 2022-05-24 DIAGNOSIS — E78.00 PURE HYPERCHOLESTEROLEMIA: ICD-10-CM

## 2022-05-24 DIAGNOSIS — I25.10 CAD IN NATIVE ARTERY: ICD-10-CM

## 2022-05-24 DIAGNOSIS — I10 ESSENTIAL HYPERTENSION: ICD-10-CM

## 2022-05-24 PROCEDURE — G8400 PT W/DXA NO RESULTS DOC: HCPCS | Performed by: INTERNAL MEDICINE

## 2022-05-24 PROCEDURE — G8427 DOCREV CUR MEDS BY ELIG CLIN: HCPCS | Performed by: INTERNAL MEDICINE

## 2022-05-24 PROCEDURE — 99213 OFFICE O/P EST LOW 20 MIN: CPT | Performed by: INTERNAL MEDICINE

## 2022-05-24 PROCEDURE — 1123F ACP DISCUSS/DSCN MKR DOCD: CPT | Performed by: INTERNAL MEDICINE

## 2022-05-24 PROCEDURE — 1090F PRES/ABSN URINE INCON ASSESS: CPT | Performed by: INTERNAL MEDICINE

## 2022-05-24 PROCEDURE — G8417 CALC BMI ABV UP PARAM F/U: HCPCS | Performed by: INTERNAL MEDICINE

## 2022-05-24 PROCEDURE — 93000 ELECTROCARDIOGRAM COMPLETE: CPT | Performed by: INTERNAL MEDICINE

## 2022-05-24 PROCEDURE — 1036F TOBACCO NON-USER: CPT | Performed by: INTERNAL MEDICINE

## 2022-05-24 RX ORDER — LISINOPRIL 30 MG/1
30 TABLET ORAL DAILY
Qty: 90 TABLET | Refills: 1 | Status: SHIPPED
Start: 2022-05-24 | End: 2022-08-31 | Stop reason: SDUPTHER

## 2022-05-24 NOTE — PROGRESS NOTES
Patient Active Problem List   Diagnosis    HTN (hypertension)    Chest pain    Ulcerative [facility] without complications ([facility] [address]    Hyperlipidemia    Major depressive disorder with single episode, in remission ([facility] [address]    RLS (restless legs syndrome)    Anemia    VHD (valvular heart disease)    Lightheadedness    Abnormal nuclear stress test       Current Outpatient Medications   Medication Sig Dispense Refill    doxycycline hyclate (VIBRA-TABS) 100 MG tablet Take 1 tablet by mouth 2 times daily for 7 days 14 tablet 0    azelastine (ASTELIN) 0.1 % nasal spray 1 spray by Nasal route 2 times daily Use in each nostril as directed 60 mL 1    montelukast (SINGULAIR) 10 MG tablet Take 1 tablet by mouth daily 30 tablet 5    torsemide (DEMADEX) 20 MG tablet Take 0.5 tablets by mouth daily 90 tablet 1    metoprolol succinate (TOPROL XL) 50 MG extended release tablet Take 1 tablet by mouth daily 90 tablet 3    escitalopram (LEXAPRO) 10 MG tablet Take 1 tablet by mouth daily 90 tablet 1    warfarin (COUMADIN) 5 MG tablet Take 1 tablet by mouth daily 90 tablet 1    warfarin (COUMADIN) 1 MG tablet Take 1 tablet by mouth daily 90 tablet 1    Cyanocobalamin (B-12) 1000 MCG TABS Take by mouth daily      MELATONIN ER PO Take by mouth nightly      Propylene Glycol-Glycerin (SOOTHE OP) Apply to eye      Cholecalciferol 50 MCG (2000 UT) CAPS Take by mouth daily       omeprazole (PRILOSEC) 20 MG delayed release capsule Take 20 mg by mouth daily       Biotin 5000 MCG TABS Take by mouth daily       lisinopril (PRINIVIL;ZESTRIL) 30 MG tablet TAKE 1 TABLET BY MOUTH DAILY 90 tablet 1    atorvastatin (LIPITOR) 40 MG tablet Take 1 tablet by mouth daily 90 tablet 1    pravastatin (PRAVACHOL) 20 MG tablet Take 1 tablet by mouth daily 90 tablet 1    nitroGLYCERIN (NITROSTAT) 0.4 MG SL tablet Place 1 tablet under the tongue every 5 minutes as needed for Chest pain up to max of 3 total doses.  If no relief after 1 dose, call 911. (Patient not taking: Reported on 5/24/2022) 25 tablet 0     No current facility-administered medications for this visit. CC:    Patient is seen in follow up for:  1. Nonrheumatic aortic valve insufficiency - moderate    2. PAF (paroxysmal atrial fibrillation) ([address]    3. Essential hypertension    4. Pure hypercholesterolemia    5. CAD in native artery - moderate        HPI:  Notes less shortness of breath on diuretic therapy. No chest pain. Tolerating beta-blocker therapy and anticoagulation well so far. ROS:   General: No unusual weight gain,  change in exercise tolerance  Skin: No rash or itching  EENT: No vision changes or nosebleeds  Cardiovascular: No orthopnea or paroxysmal nocturnal dyspnea  Respiratory: No cough or hemoptysis  Gastrointestinal: No hematemesis or recent changes in bowel habits  Genitourinary: No hematuria, urgency or frequency  Musculoskeletal: No muscular weakness or joint swelling   Neurologic / Psychiatric: No incoordination or convulsions  Allergic / Immunologic/ Lymphatic / Endocrine: No anemia or bleeding tendency    Social History     Socioeconomic History    Marital status:       Spouse name: Not on file    Number of children: Not on file    Years of education: Not on file    Highest education level: Not on file   Occupational History    Not on file   Tobacco Use    Smoking status: Never Smoker    Smokeless tobacco: Never Used   Vaping Use    Vaping Use: Never used   Substance and Sexual Activity    Alcohol use: Yes     Comment: occasionally    Drug use: No    Sexual activity: Not on file   Other Topics Concern    Not on file   Social History Narrative    Not on file     Social Determinants of Health     Financial Resource Strain: Low Risk     Difficulty of Paying Living Expenses: Not hard at all   Food Insecurity: No Food Insecurity    Worried About [address] in the Last Year: Never true    [facility] in the Last Year: Never true   Transportation Needs:     Lack of Transportation (Medical): Not on file    Lack of Transportation (Non-Medical):  Not on file   Physical Activity: Insufficiently Active    Days of Exercise per Week: 3 days    Minutes of Exercise per Session: 30 min   Stress:     Feeling of Stress : Not on file   Social Connections:     Frequency of Communication with Friends and Family: Not on file    Frequency of Social Gatherings with Friends and Family: Not on file    Attends Jew Services: Not on file    Active Member of Clubs or Organizations: Not on file    Attends Club or Organization Meetings: Not on file    Marital Status: Not on file   Intimate Partner Violence:     Fear of Current or Ex-Partner: Not on file    Emotionally Abused: Not on file    Physically Abused: Not on file    Sexually Abused: Not on file   Housing Stability:     Unable to Pay for Housing in the Last Year: Not on file    Number of [place] in the Last Year: Not on file    Unstable Housing in the Last Year: Not on file       Family History   Problem Relation Age of Onset    Heart Disease Father          age 80    Stroke Father    [facility] Diabetes Mother          age 80    Heart Disease Mother         CHF    Other Brother         PVD, obesity    Heart Disease Brother         CHF    Cancer Paternal Grandmother         breast       Past Medical History:   Diagnosis Date    Anemia     Cholelithiasis     Chronic congestion of paranasal sinus     Colon polyps     Depression     Diastolic dysfunction     Diverticulosis     History of blood transfusion     HTN (hypertension)     Hyperlipidemia 2019    Irritable bladder     Lumbar degenerative disc disease     Lumbar herniated disc     Lumbar spinal stenosis     Major depressive disorder with single episode, in remission ([facility] [address] 2019    Osteoarthritis     Overactive bladder     RLS (restless legs syndrome) 2019    Ulcerative colitis ([facility] [address]  [facility] complications [facility] 55/96/5588    Valvular heart disease     Vitamin D deficiency     Wrist fracture     Right       PHYSICAL EXAM:  CONSTITUTIONAL:  Well developed, well nourished    Vitals:    05/24/22 1122   BP: 136/68   Pulse: 67   Resp: 16   Weight: 156 lb (70.8 kg)   Height: 5' 1\" (1.549 m)     HEAD & FACE: Normocephalic. Symmetric. EYES: No xanthelasma. Conjunctivae not injected. EARS, NOSE, MOUTH & THROAT: Good dentition. No oral pallor or cyanosis. NECK: No JVD at 30 degrees. No thyromegaly. RESPIRATORY: Clear to auscultation and percussion in all fields. No use of accessory muscle or intercostal retractions. CARDIOVASCULAR: Regular rate and rhythm. No lifts or thrills on palpitation. Auscultation with normal S1-S2 in intensity and splitting. No carotid bruits. Abdominal aorta not enlarged. Femoral arteries without bruits. Pedal pulses 2+. No edema. ABDOMEN: Soft without hepatic or splenic enlargement. No tenderness. MUSCULOSKELETAL: No kyphosis or scoliosis of the back. Good muscle strength and tone. No muscle atrophy. Normal gait and ability to undergo exercise stress testing. EXTREMITIES: No clubbing or cyanosis. SKIN: No Xanthomas or ulcerations. NEUROLOGIC: Oriented to time, place and person. Normal mood and affect. LYMPHATIC:  No palpable neck or supraclavicular nodes. No splenomegaly. EKG: the EKG tracing was reviewed and found to reveal: Normal sinus rhythm. Anterior T wave inversions.  ms. New change compared to prior tracing. ASSESSMENT:                                                     ORDERS:       Diagnosis Orders   1. Nonrheumatic aortic valve insufficiency - moderate  EKG 12 Lead   2. PAF (paroxysmal atrial fibrillation) ([facility])  EKG 12 Lead   3. Essential hypertension  EKG 12 Lead   4. Pure hypercholesterolemia  EKG 12 Lead   5.  CAD in native artery - moderate  EKG 12 Lead   Shortness of breath improved post institution of diuretic therapy. PLAN:   See above orders. Medication reconciliation completed. Old records were reviewed and found to reveal: Creatinine 1.1 and BUN 24 on 5/17/2022  Discussed issues that would prompt earlier evaluation. Same cardiac medications.     Follow-up office visit in 6 months

## 2022-05-24 NOTE — TELEPHONE ENCOUNTER
Patient would like this to be 90 day Rx      Last Appointment:  5/23/2022  Future Appointments   Date Time Provider Jay Chicasisti   5/24/2022 11:20 AM Michelle Hernandez MD [address] [facility]   6/6/2022  3:50 PM SCHEDULE, KORTNEY Padilla [facility]   8/31/2022  3:45 PM Tatum Birmingham  [address] [facility] [address]

## 2022-06-03 DIAGNOSIS — E78.5 HYPERLIPIDEMIA, UNSPECIFIED HYPERLIPIDEMIA TYPE: ICD-10-CM

## 2022-06-03 DIAGNOSIS — I10 PRIMARY HYPERTENSION: ICD-10-CM

## 2022-06-03 LAB
ANION GAP SERPL CALCULATED.3IONS-SCNC: 14 MMOL/L (ref 7–16)
BUN BLDV-MCNC: 23 MG/DL (ref 6–23)
CALCIUM SERPL-MCNC: 9.5 MG/DL (ref 8.6–10.2)
CHLORIDE BLD-SCNC: 106 MMOL/L (ref 98–107)
CHOLESTEROL, TOTAL: 143 MG/DL (ref 0–199)
CO2: 25 MMOL/L (ref 22–29)
CREAT SERPL-MCNC: 1 MG/DL (ref 0.5–1)
GFR AFRICAN AMERICAN: >60
GFR NON-AFRICAN AMERICAN: 54 ML/MIN/1.73
GLUCOSE BLD-MCNC: 86 MG/DL (ref 74–99)
HDLC SERPL-MCNC: 40 MG/DL
LDL CHOLESTEROL CALCULATED: 70 MG/DL (ref 0–99)
MAGNESIUM: 2 MG/DL (ref 1.6–2.6)
POTASSIUM SERPL-SCNC: 4.1 MMOL/L (ref 3.5–5)
SODIUM BLD-SCNC: 145 MMOL/L (ref 132–146)
TRIGL SERPL-MCNC: 167 MG/DL (ref 0–149)
VLDLC SERPL CALC-MCNC: 33 MG/DL

## 2022-06-04 ENCOUNTER — TELEPHONE (OUTPATIENT)
Dept: FAMILY MEDICINE CLINIC | Age: 77
End: 2022-06-04

## 2022-06-05 NOTE — TELEPHONE ENCOUNTER
Letter typed up for patient letting her know that her labs came back okay. Attached a copy of the results for her records. Will give to her when she comes into the office Monday afternoon.

## 2022-06-06 ENCOUNTER — NURSE ONLY (OUTPATIENT)
Dept: FAMILY MEDICINE CLINIC | Age: 77
End: 2022-06-06
Payer: MEDICARE

## 2022-06-06 DIAGNOSIS — Z79.01 ANTICOAGULATED: Primary | ICD-10-CM

## 2022-06-06 LAB
INTERNATIONAL NORMALIZATION RATIO, POC: 3.5
PROTHROMBIN TIME, POC: 41.9

## 2022-06-06 PROCEDURE — 93793 ANTICOAG MGMT PT WARFARIN: CPT | Performed by: INTERNAL MEDICINE

## 2022-06-06 PROCEDURE — 85610 PROTHROMBIN TIME: CPT | Performed by: INTERNAL MEDICINE

## 2022-06-06 NOTE — PROGRESS NOTES
Patient presents today on the nurse schedule to have her INR checked. It was 3.5. Contacted Dr Steven Barriga via cell phone as directed by him. He stated to have the patient hold her coumadin for 2 days then go to 6mg Monday thru Friday then 4mg Saturday and Sunday. Recheck in 2 weeks. I notified patient while she was still in the office and scheduled her for a 2 week INR check.

## 2022-06-23 ENCOUNTER — OFFICE VISIT (OUTPATIENT)
Dept: FAMILY MEDICINE CLINIC | Age: 77
End: 2022-06-23
Payer: MEDICARE

## 2022-06-23 VITALS
OXYGEN SATURATION: 98 % | TEMPERATURE: 97.8 F | WEIGHT: 152 LBS | BODY MASS INDEX: 28.7 KG/M2 | HEIGHT: 61 IN | SYSTOLIC BLOOD PRESSURE: 114 MMHG | DIASTOLIC BLOOD PRESSURE: 64 MMHG | HEART RATE: 68 BPM

## 2022-06-23 DIAGNOSIS — I10 ESSENTIAL HYPERTENSION: ICD-10-CM

## 2022-06-23 DIAGNOSIS — N18.31 STAGE 3A CHRONIC KIDNEY DISEASE (HCC): ICD-10-CM

## 2022-06-23 DIAGNOSIS — Z91.81 AT HIGH RISK FOR FALLS: ICD-10-CM

## 2022-06-23 DIAGNOSIS — Z79.01 ANTICOAGULATED: Primary | ICD-10-CM

## 2022-06-23 DIAGNOSIS — I48.0 PAF (PAROXYSMAL ATRIAL FIBRILLATION) (HCC): ICD-10-CM

## 2022-06-23 DIAGNOSIS — E78.5 HYPERLIPIDEMIA, UNSPECIFIED HYPERLIPIDEMIA TYPE: ICD-10-CM

## 2022-06-23 DIAGNOSIS — F32.5 MAJOR DEPRESSIVE DISORDER WITH SINGLE EPISODE, IN REMISSION (HCC): ICD-10-CM

## 2022-06-23 PROBLEM — N18.30 CHRONIC RENAL DISEASE, STAGE III (HCC): Status: ACTIVE | Noted: 2022-06-23

## 2022-06-23 LAB
INTERNATIONAL NORMALIZATION RATIO, POC: 2.2
PROTHROMBIN TIME, POC: 26.1

## 2022-06-23 PROCEDURE — 99213 OFFICE O/P EST LOW 20 MIN: CPT | Performed by: INTERNAL MEDICINE

## 2022-06-23 PROCEDURE — 1123F ACP DISCUSS/DSCN MKR DOCD: CPT | Performed by: INTERNAL MEDICINE

## 2022-06-23 PROCEDURE — G8427 DOCREV CUR MEDS BY ELIG CLIN: HCPCS | Performed by: INTERNAL MEDICINE

## 2022-06-23 PROCEDURE — 1090F PRES/ABSN URINE INCON ASSESS: CPT | Performed by: INTERNAL MEDICINE

## 2022-06-23 PROCEDURE — G8400 PT W/DXA NO RESULTS DOC: HCPCS | Performed by: INTERNAL MEDICINE

## 2022-06-23 PROCEDURE — 1036F TOBACCO NON-USER: CPT | Performed by: INTERNAL MEDICINE

## 2022-06-23 PROCEDURE — G8417 CALC BMI ABV UP PARAM F/U: HCPCS | Performed by: INTERNAL MEDICINE

## 2022-06-23 PROCEDURE — 85610 PROTHROMBIN TIME: CPT | Performed by: INTERNAL MEDICINE

## 2022-06-23 RX ORDER — PRAVASTATIN SODIUM 20 MG
20 TABLET ORAL DAILY
Qty: 90 TABLET | Refills: 1 | Status: SHIPPED
Start: 2022-06-23 | End: 2022-07-11 | Stop reason: ALTCHOICE

## 2022-06-23 RX ORDER — ESCITALOPRAM OXALATE 10 MG/1
10 TABLET ORAL DAILY
Qty: 90 TABLET | Refills: 1 | Status: SHIPPED
Start: 2022-06-23 | End: 2022-10-26 | Stop reason: ALTCHOICE

## 2022-06-23 RX ORDER — MONTELUKAST SODIUM 10 MG/1
10 TABLET ORAL DAILY
Qty: 90 TABLET | Refills: 1 | Status: SHIPPED
Start: 2022-06-23 | End: 2022-10-26 | Stop reason: ALTCHOICE

## 2022-06-23 RX ORDER — WARFARIN SODIUM 1 MG/1
1 TABLET ORAL DAILY
Qty: 90 TABLET | Refills: 1 | Status: SHIPPED
Start: 2022-06-23 | End: 2022-08-13 | Stop reason: SDUPTHER

## 2022-06-23 RX ORDER — AZELASTINE 1 MG/ML
1 SPRAY, METERED NASAL 2 TIMES DAILY
Qty: 60 ML | Refills: 1 | Status: SHIPPED | OUTPATIENT
Start: 2022-06-23

## 2022-06-23 RX ORDER — WARFARIN SODIUM 5 MG/1
5 TABLET ORAL DAILY
Qty: 90 TABLET | Refills: 1 | Status: SHIPPED | OUTPATIENT
Start: 2022-06-23

## 2022-06-23 NOTE — PROGRESS NOTES
On the basis of positive falls risk screening, assessment and plan is as follows: home safety tips provided       [address] [place] [address]     22  Tyra Mcardle : 1945 Sex: female  Age: 65 y.o. Chief Complaint   Patient presents with    Office Visit for Anticoagulation Management     2 week INR, check b12       HPI  Patient presents today for anticoagulation management/INR check/follow-up. She has been doing well since last visit. INR today was 2.2 on 6 mg Coumadin Monday through Friday and 4 mg Saturday and . No bleeding or thrombotic episodes. She did see cardiology since I saw her last and I did review their note. Review of Systems   Constitutional: Negative for activity change, appetite change, chills, diaphoresis, , fever and unexpected weight change.    HENT: Negative  Respiratory: Negative for cough,  and wheezing.   Positive for dyspnea on exertion-improved  Cardiovascular: Negative for palpitations and leg swelling.  She did have recent positive stress test and recent heart catheterization-see above.-recent PAF on anticoagulation  gastrointestinal: Negative for abdominal pain, blood in stool, constipation, diarrhea, nausea and vomiting.  Does have history of ulcerative colitis. -Quiescent at this point  Endocrine: Negative.    Genitourinary:  Negative for difficulty urinating, dysuria, frequency and hematuria.    No longer seeing urology  Musculoskeletal: Positive for arthralgias and back pain-see above.  Negative for myalgias.       Skin: Negative.    Allergic/Immunologic: Negative for environmental allergies and immunocompromised state. Neurological: Negative for, weakness, light-headedness, numbness and headaches. Hematological: Negative. psychiatric/Behavioral:        Depression has improved  on her SSRI                REST OF PERTINENT ROS GONE OVER AND WAS NEGATIVE.                Current Outpatient Medications:     montelukast (SINGULAIR) 10 MG tablet, Take 1 tablet by mouth daily, Disp: 90 tablet, Rfl: 1    azelastine (ASTELIN) 0.1 % nasal spray, 1 spray by Nasal route 2 times daily Use in each nostril as directed, Disp: 60 mL, Rfl: 1    escitalopram (LEXAPRO) 10 MG tablet, Take 1 tablet by mouth daily, Disp: 90 tablet, Rfl: 1    pravastatin (PRAVACHOL) 20 MG tablet, Take 1 tablet by mouth daily, Disp: 90 tablet, Rfl: 1    warfarin (COUMADIN) 1 MG tablet, Take 1 tablet by mouth daily, Disp: 90 tablet, Rfl: 1    warfarin (COUMADIN) 5 MG tablet, Take 1 tablet by mouth daily, Disp: 90 tablet, Rfl: 1    lisinopril (PRINIVIL;ZESTRIL) 30 MG tablet, TAKE 1 TABLET BY MOUTH DAILY, Disp: 90 tablet, Rfl: 1    atorvastatin (LIPITOR) 40 MG tablet, Take 1 tablet by mouth daily, Disp: 90 tablet, Rfl: 1    torsemide (DEMADEX) 20 MG tablet, Take 0.5 tablets by mouth daily, Disp: 90 tablet, Rfl: 1    metoprolol succinate (TOPROL XL) 50 MG extended release tablet, Take 1 tablet by mouth daily, Disp: 90 tablet, Rfl: 3    nitroGLYCERIN (NITROSTAT) 0.4 MG SL tablet, Place 1 tablet under the tongue every 5 minutes as needed for Chest pain up to max of 3 total doses.  If no relief after 1 dose, call 911., Disp: 25 tablet, Rfl: 0    Cyanocobalamin (B-12) 1000 MCG TABS, Take by mouth daily, Disp: , Rfl:     MELATONIN ER PO, Take by mouth nightly, Disp: , Rfl:     Propylene Glycol-Glycerin (SOOTHE OP), Apply to eye, Disp: , Rfl:     Cholecalciferol 50 MCG (2000 UT) CAPS, Take by mouth daily , Disp: , Rfl:     omeprazole (PRILOSEC) 20 MG delayed release capsule, Take 20 mg by mouth daily , Disp: , Rfl:     Biotin 5000 MCG TABS, Take by mouth daily , Disp: , Rfl:   Allergies   Allergen Reactions    Erythromycin Hives    Penicillin G     Azithromycin     Penicillins Hives    Propoxyphene        Past Medical History:   Diagnosis Date    Anemia     Cholelithiasis     Chronic congestion of paranasal sinus     Colon polyps     Depression     Diastolic dysfunction     Diverticulosis     History of blood transfusion     HTN (hypertension)     Hyperlipidemia 2019    Irritable bladder     Lumbar degenerative disc disease     Lumbar herniated disc     Lumbar spinal stenosis     Major depressive disorder with single episode, in remission ([facility] 2019    Osteoarthritis     Overactive bladder     RLS (restless legs syndrome) 2019    Ulcerative colitis ([facility]     [facility]     Valvular heart disease     Vitamin D deficiency     Wrist fracture     Right     Past Surgical History:   Procedure Laterality Date    BLADDER SUSPENSION      BREAST LUMPECTOMY Left     x 2, negative    BUNIONECTOMY      CARDIAC CATHETERIZATION  2012    CARPAL TUNNEL RELEASE Right     CATARACT REMOVAL WITH IMPLANT Right     COLONOSCOPY  2014    HYSTERECTOMY, TOTAL ABDOMINAL (CERVIX REMOVED)      non-cancerous    JOINT REPLACEMENT Bilateral     hip    KNEE ARTHROSCOPY Left     RECTOCELE REPAIR       Family History   Problem Relation Age of Onset    Heart Disease Father          age 80    Stroke Father    Janae Jane Diabetes Mother          age 80    Heart Disease Mother         CHF    Other Brother         PVD, obesity    Heart Disease Brother         CHF    Cancer Paternal Grandmother         breast     Social History     Socioeconomic History    Marital status:       Spouse name: Not on file    Number of children: Not on file    Years of education: Not on file    Highest education level: Not on file   Occupational History    Not on file   Tobacco Use    Smoking status: Never Smoker    Smokeless tobacco: Never Used   Vaping Use    Vaping Use: Never used   Substance and Sexual Activity    Alcohol use: Yes     Comment: occasionally    Drug use: No    Sexual activity: Not on file   Other Topics Concern    Not on file   Social History Narrative    Not on file     Social Determinants of Health Financial Resource Strain:     Difficulty of Paying Living Expenses: Not on file   Food Insecurity:     Worried About Running Out of Food in the Last Year: Not on file    [place] of Food in the Last Year: Not on file   Transportation Needs:     Lack of Transportation (Medical): Not on file    Lack of Transportation (Non-Medical): Not on file   Physical Activity: Insufficiently Active    Days of Exercise per Week: 3 days    Minutes of Exercise per Session: 30 min   Stress:     Feeling of Stress : Not on file   Social Connections:     Frequency of Communication with Friends and Family: Not on file    Frequency of Social Gatherings with Friends and Family: Not on file    Attends Druze Services: Not on file    Active Member of Clubs or Organizations: Not on file    Attends Club or Organization Meetings: Not on file    Marital Status: Not on file   Intimate Partner Violence:     Fear of Current or Ex-Partner: Not on file    Emotionally Abused: Not on file    Physically Abused: Not on file    Sexually Abused: Not on file   Housing Stability:     Unable to Pay for Housing in the Last Year: Not on file    Number of [place] in the Last Year: Not on file    Unstable Housing in the Last Year: Not on file       Vitals:    06/23/22 0706   BP: 114/64   Pulse: 68   Temp: 97.8 °F (36.6 °C)   TempSrc: Temporal   SpO2: 98%   Weight: 152 lb (68.9 kg)   Height: 5' 1\" (1.549 m)       Physical Exam    No physical exam performed today          Assessment and Plan:  Moy Shepherd was seen today for office visit for anticoagulation management. Diagnoses and all orders for this visit:    Anticoagulated  -     POCT INR    Major depressive disorder with single episode[facility]  -     escitalopram (LEXAPRO) 10 MG tablet; Take 1 tablet by mouth daily    Hyperlipidemia, unspecified hyperlipidemia type  -     pravastatin (PRAVACHOL) 20 MG tablet;  Take 1 tablet by mouth daily    PAF (paroxysmal atrial fibrillation) ([facility]    Stage 3a chronic kidney disease [facility]    Essential hypertension    Other orders  -     montelukast (SINGULAIR) 10 MG tablet; Take 1 tablet by mouth daily  -     azelastine (ASTELIN) 0.1 % nasal spray; 1 spray by Nasal route 2 times daily Use in each nostril as directed  -     warfarin (COUMADIN) 1 MG tablet; Take 1 tablet by mouth daily  -     warfarin (COUMADIN) 5 MG tablet; Take 1 tablet by mouth daily    Plan: Same dose Coumadin. Recheck INR in 1 month. Regular follow-up visit in 2 months. Prescription management performed today after review of efficacy of medication on her current medical conditions. Fall precautions. Notify us of problems in the interim. Return in about 1 month (around 7/23/2022) for 1 month inr, 2 month reg fu. Seen By:  Betty Garcia MD      *Document was created using voice recognition software. Note was reviewed however may contain grammatical errors. Emilie Mohamud

## 2022-07-02 RX ORDER — WARFARIN SODIUM 1 MG/1
1 TABLET ORAL DAILY
Qty: 90 TABLET | Refills: 1 | Status: CANCELLED | OUTPATIENT
Start: 2022-07-02

## 2022-07-28 ENCOUNTER — OFFICE VISIT (OUTPATIENT)
Dept: FAMILY MEDICINE CLINIC | Age: 77
End: 2022-07-28
Payer: MEDICARE

## 2022-07-28 VITALS
TEMPERATURE: 97.9 F | WEIGHT: 151.4 LBS | HEIGHT: 61 IN | DIASTOLIC BLOOD PRESSURE: 60 MMHG | BODY MASS INDEX: 28.58 KG/M2 | OXYGEN SATURATION: 97 % | HEART RATE: 74 BPM | SYSTOLIC BLOOD PRESSURE: 122 MMHG

## 2022-07-28 DIAGNOSIS — I48.0 PAF (PAROXYSMAL ATRIAL FIBRILLATION) (HCC): ICD-10-CM

## 2022-07-28 DIAGNOSIS — Z79.01 ANTICOAGULATED: Primary | ICD-10-CM

## 2022-07-28 DIAGNOSIS — I10 ESSENTIAL HYPERTENSION: ICD-10-CM

## 2022-07-28 DIAGNOSIS — F32.5 MAJOR DEPRESSIVE DISORDER WITH SINGLE EPISODE, IN REMISSION (HCC): ICD-10-CM

## 2022-07-28 LAB
INTERNATIONAL NORMALIZATION RATIO, POC: 2.4
PROTHROMBIN TIME, POC: 28.8

## 2022-07-28 PROCEDURE — 99213 OFFICE O/P EST LOW 20 MIN: CPT | Performed by: INTERNAL MEDICINE

## 2022-07-28 PROCEDURE — 1036F TOBACCO NON-USER: CPT | Performed by: INTERNAL MEDICINE

## 2022-07-28 PROCEDURE — G8400 PT W/DXA NO RESULTS DOC: HCPCS | Performed by: INTERNAL MEDICINE

## 2022-07-28 PROCEDURE — G8427 DOCREV CUR MEDS BY ELIG CLIN: HCPCS | Performed by: INTERNAL MEDICINE

## 2022-07-28 PROCEDURE — 1123F ACP DISCUSS/DSCN MKR DOCD: CPT | Performed by: INTERNAL MEDICINE

## 2022-07-28 PROCEDURE — 85610 PROTHROMBIN TIME: CPT | Performed by: INTERNAL MEDICINE

## 2022-07-28 PROCEDURE — 1090F PRES/ABSN URINE INCON ASSESS: CPT | Performed by: INTERNAL MEDICINE

## 2022-07-28 PROCEDURE — G8417 CALC BMI ABV UP PARAM F/U: HCPCS | Performed by: INTERNAL MEDICINE

## 2022-07-29 NOTE — PROGRESS NOTES
[facility]     22  [facility] : 1945 Sex: female  Age: 65 y.o. Chief Complaint   Patient presents with    Office Visit for Anticoagulation Management     1 mnth INR       HPI  Patient presents today for INR check/anticoagulation management/follow-up. Patient's INR today was 2.4. No bleeding or thrombotic episodes. Currently on 4 mg  and 6 mg Monday through Friday. Currently on Coumadin because of PAF. This has been quiescent. Tells me blood pressures been in good range on her antihypertensive medication. Depression has been stable and controlled on her Lexapro. Review of Systems    Constitutional: Negative for activity change, appetite change, chills, diaphoresis, , fever and unexpected weight change. HENT: Negative  Respiratory: Negative for cough,  and wheezing. Positive for dyspnea on exertion-improved  Cardiovascular: Negative for palpitations and leg swelling. She did have recent positive stress test and recent heart catheterization-see above.-recent PAF on anticoagulation  gastrointestinal: Negative for abdominal pain, blood in stool, constipation, diarrhea, nausea and vomiting. Does have history of ulcerative colitis. -Quiescent at this point  Endocrine: Negative. Genitourinary:  Negative for difficulty urinating, dysuria, frequency and hematuria. No longer seeing urology  Musculoskeletal: Positive for arthritis  negative for myalgias. Skin: Negative. Allergic/Immunologic: Negative for environmental allergies and immunocompromised state. Neurological: Negative for, weakness, light-headedness, numbness and headaches. Hematological: Negative. psychiatric/Behavioral:        Depression has improved  on her SSRI                 REST OF PERTINENT ROS GONE OVER AND WAS NEGATIVE.        Current Outpatient Medications:     montelukast (SINGULAIR) 10 MG tablet, Take 1 tablet by mouth daily, Disp: 90 tablet, Rfl: 1    azelastine (ASTELIN) 0.1 % nasal spray, 1 spray by Nasal route 2 times daily Use in each nostril as directed, Disp: 60 mL, Rfl: 1    escitalopram (LEXAPRO) 10 MG tablet, Take 1 tablet by mouth daily, Disp: 90 tablet, Rfl: 1    warfarin (COUMADIN) 1 MG tablet, Take 1 tablet by mouth daily, Disp: 90 tablet, Rfl: 1    warfarin (COUMADIN) 5 MG tablet, Take 1 tablet by mouth daily, Disp: 90 tablet, Rfl: 1    lisinopril (PRINIVIL;ZESTRIL) 30 MG tablet, TAKE 1 TABLET BY MOUTH DAILY, Disp: 90 tablet, Rfl: 1    atorvastatin (LIPITOR) 40 MG tablet, Take 1 tablet by mouth daily, Disp: 90 tablet, Rfl: 1    torsemide (DEMADEX) 20 MG tablet, Take 0.5 tablets by mouth daily, Disp: 90 tablet, Rfl: 1    metoprolol succinate (TOPROL XL) 50 MG extended release tablet, Take 1 tablet by mouth daily, Disp: 90 tablet, Rfl: 3    nitroGLYCERIN (NITROSTAT) 0.4 MG SL tablet, Place 1 tablet under the tongue every 5 minutes as needed for Chest pain up to max of 3 total doses.  If no relief after 1 dose, call 911., Disp: 25 tablet, Rfl: 0    Cyanocobalamin (B-12) 1000 MCG TABS, Take by mouth daily, Disp: , Rfl:     MELATONIN ER PO, Take by mouth nightly, Disp: , Rfl:     Propylene Glycol-Glycerin (SOOTHE OP), Apply to eye, Disp: , Rfl:     Cholecalciferol 50 MCG (2000 UT) CAPS, Take by mouth daily , Disp: , Rfl:     omeprazole (PRILOSEC) 20 MG delayed release capsule, Take 20 mg by mouth daily , Disp: , Rfl:     Biotin 5000 MCG TABS, Take by mouth daily , Disp: , Rfl:   Allergies   Allergen Reactions    Erythromycin Hives    Penicillin G     Azithromycin     Penicillins Hives    Propoxyphene        Past Medical History:   Diagnosis Date    Anemia     Cholelithiasis     Chronic congestion of paranasal sinus     Colon polyps     Depression     Diastolic dysfunction     Diverticulosis     History of blood transfusion     HTN (hypertension)     Hyperlipidemia 06/20/2019    Irritable bladder     Lumbar degenerative disc disease     Lumbar herniated disc Lumbar spinal stenosis     Major depressive disorder with single episode, in remission ([facility] 2019    Osteoarthritis     Overactive bladder     RLS (restless legs syndrome) 2019    Ulcerative colitis ([facility]     Ulcerative colitis without complications ([facility]     Valvular heart disease     Vitamin D deficiency     Wrist fracture     Right     Past Surgical History:   Procedure Laterality Date    BLADDER SUSPENSION      BREAST LUMPECTOMY Left     x 2, negative    BUNIONECTOMY      CARDIAC CATHETERIZATION  2012    CARPAL TUNNEL RELEASE Right     CATARACT REMOVAL WITH IMPLANT Right     COLONOSCOPY  2014    HYSTERECTOMY, TOTAL ABDOMINAL (CERVIX REMOVED)      non-cancerous    JOINT REPLACEMENT Bilateral     hip    KNEE ARTHROSCOPY Left     RECTOCELE REPAIR       Family History   Problem Relation Age of Onset    Heart Disease Father          age 80    Stroke Father     Diabetes Mother          age 80    Heart Disease Mother         CHF    Other Brother         PVD, obesity    Heart Disease Brother         CHF    Cancer Paternal Grandmother         breast     Social History     Socioeconomic History    Marital status:       Spouse name: Not on file    Number of children: Not on file    Years of education: Not on file    Highest education level: Not on file   Occupational History    Not on file   Tobacco Use    Smoking status: Never    Smokeless tobacco: Never   Vaping Use    Vaping Use: Never used   Substance and Sexual Activity    Alcohol use: Yes     Comment: occasionally    Drug use: No    Sexual activity: Not on file   Other Topics Concern    Not on file   Social History Narrative    Not on file     Social Determinants of Health     Financial Resource Strain: Not on file   Food Insecurity: Not on file   Transportation Needs: Not on file   Physical Activity: Insufficiently Active    Days of Exercise per Week: 3 days    Minutes of Exercise per Session: 30 min   Stress: Not on file   Social Connections: Not on file   Intimate Partner Violence: Not on file   Housing Stability: Not on file       Vitals:    07/28/22 1558   BP: 122/60   Pulse: 74   Temp: 97.9 °F (36.6 °C)   TempSrc: Temporal   SpO2: 97%   Weight: 151 lb 6.4 oz (68.7 kg)   Height: 5' 1\" (1.549 m)       Physical Exam  Constitutional: She is oriented to person, place, and time. She appears well-developed and well-nourished. No distress. Neck: Normal range of motion. Neck supple. No JVD present. No thyromegaly present. Cardiovascular: Normal rate, regular rhythm, normal heart sounds and intact distal pulses. Exam reveals no gallop and no friction rub. No murmur heard. Pulmonary/Chest: Effort normal and breath sounds normal. No respiratory distress. She has no wheezes. She has no rales. Psychiatric: She has a normal mood and affect. Her behavior is normal.   Nursing note and vitals reviewed             Assessment and Plan:  Yesi Giles was seen today for office visit for anticoagulation management. Diagnoses and all orders for this visit:    Anticoagulated  -     POCT INR    PAF (paroxysmal atrial fibrillation) (HCC)    Major depressive disorder with single episode, in remission ([facility]    Essential hypertension      Plan: Continue same dose Coumadin. See back in 1 month for INR check and her regular follow-up visit. Fall precautions. Look over last regular visit disease (5/23/2022) note when I see her next           Return for keep appt. Seen By:  Shaw Garcia MD      *Document was created using voice recognition software. Note was reviewed however may contain grammatical errors.

## 2022-08-31 ENCOUNTER — OFFICE VISIT (OUTPATIENT)
Dept: FAMILY MEDICINE CLINIC | Age: 77
End: 2022-08-31
Payer: MEDICARE

## 2022-08-31 VITALS
BODY MASS INDEX: 28.43 KG/M2 | HEART RATE: 60 BPM | SYSTOLIC BLOOD PRESSURE: 126 MMHG | DIASTOLIC BLOOD PRESSURE: 62 MMHG | WEIGHT: 150.6 LBS | OXYGEN SATURATION: 98 % | HEIGHT: 61 IN | TEMPERATURE: 97.6 F

## 2022-08-31 DIAGNOSIS — N18.31 STAGE 3A CHRONIC KIDNEY DISEASE (HCC): ICD-10-CM

## 2022-08-31 DIAGNOSIS — L65.9 HAIR LOSS: ICD-10-CM

## 2022-08-31 DIAGNOSIS — Z91.81 AT HIGH RISK FOR FALLS: ICD-10-CM

## 2022-08-31 DIAGNOSIS — I48.0 PAF (PAROXYSMAL ATRIAL FIBRILLATION) (HCC): ICD-10-CM

## 2022-08-31 DIAGNOSIS — Z79.01 ANTICOAGULATED: ICD-10-CM

## 2022-08-31 DIAGNOSIS — I10 ESSENTIAL HYPERTENSION: ICD-10-CM

## 2022-08-31 DIAGNOSIS — F32.5 MAJOR DEPRESSIVE DISORDER WITH SINGLE EPISODE, IN REMISSION (HCC): ICD-10-CM

## 2022-08-31 DIAGNOSIS — L98.9 SKIN LESION: ICD-10-CM

## 2022-08-31 DIAGNOSIS — E78.5 HYPERLIPIDEMIA, UNSPECIFIED HYPERLIPIDEMIA TYPE: ICD-10-CM

## 2022-08-31 LAB
INTERNATIONAL NORMALIZATION RATIO, POC: 2.6
PROTHROMBIN TIME, POC: 31.2

## 2022-08-31 PROCEDURE — G8427 DOCREV CUR MEDS BY ELIG CLIN: HCPCS | Performed by: INTERNAL MEDICINE

## 2022-08-31 PROCEDURE — G8417 CALC BMI ABV UP PARAM F/U: HCPCS | Performed by: INTERNAL MEDICINE

## 2022-08-31 PROCEDURE — G8400 PT W/DXA NO RESULTS DOC: HCPCS | Performed by: INTERNAL MEDICINE

## 2022-08-31 PROCEDURE — 1090F PRES/ABSN URINE INCON ASSESS: CPT | Performed by: INTERNAL MEDICINE

## 2022-08-31 PROCEDURE — 99214 OFFICE O/P EST MOD 30 MIN: CPT | Performed by: INTERNAL MEDICINE

## 2022-08-31 PROCEDURE — 1123F ACP DISCUSS/DSCN MKR DOCD: CPT | Performed by: INTERNAL MEDICINE

## 2022-08-31 PROCEDURE — 85610 PROTHROMBIN TIME: CPT | Performed by: INTERNAL MEDICINE

## 2022-08-31 PROCEDURE — 1036F TOBACCO NON-USER: CPT | Performed by: INTERNAL MEDICINE

## 2022-08-31 RX ORDER — ATORVASTATIN CALCIUM 40 MG/1
40 TABLET, FILM COATED ORAL DAILY
Qty: 90 TABLET | Refills: 1 | Status: SHIPPED | OUTPATIENT
Start: 2022-08-31

## 2022-08-31 RX ORDER — LISINOPRIL 30 MG/1
30 TABLET ORAL DAILY
Qty: 90 TABLET | Refills: 1 | Status: SHIPPED | OUTPATIENT
Start: 2022-08-31

## 2022-08-31 NOTE — PROGRESS NOTES
[address] [place]     22  Nilton Palmer : 1945 Sex: female  Age: 65 y.o. Chief Complaint   Patient presents with    Hypertension     3 months    Office Visit for Anticoagulation Management     INR check       HPI  Patient presents today for 3-month follow-up visit and INR check/anticoagulation management accompanied by her daughter. INR today was 2.6 which is therapeutic. No bleeding or thrombotic episodes. Last few notes. Brings in blood pressure numbers today which look to be okay in general.  Couple running in the 140 range but generally less than that. She is tolerating her antihypertensive medication. Her depression has been stable and controlled on her SSRI. Her lipids have been stable and controlled on statin medication. GERD has been stable and controlled on her PPI. PAF is quiescent and currently in sinus rhythm. She is anticoagulated. She does have a couple complaints today as well including loss of hair over the last several months. She had a couple skin lesions wanted me to look at. She follows with GI, orthopedics and now cardiology         Review of Systems    Constitutional: Negative for activity change, appetite change, chills, diaphoresis, , fever and unexpected weight change. HENT: Negative  Respiratory: Negative for cough,  and wheezing. Positive for dyspnea on exertion-improved  Cardiovascular: Negative for palpitations and leg swelling. She did have recent positive stress test and recent heart catheterization from-recent PAF on anticoagulation  gastrointestinal: Negative for abdominal pain, blood in stool, constipation, diarrhea, nausea and vomiting. Does have history of ulcerative colitis. -Quiescent at this point  Endocrine: Negative. Genitourinary:  Negative for difficulty urinating, dysuria, frequency and hematuria. No longer seeing urology  Musculoskeletal: Positive for arthralgias   Negative for myalgias.        Skin: Hair loss and couple roughened skin lesions allergic/Immunologic: Negative for environmental allergies and immunocompromised state. Neurological: Negative for, weakness, light-headedness, numbness and headaches. Hematological: Negative. psychiatric/Behavioral:        Depression has improved  on her SSRI              REST OF PERTINENT ROS GONE OVER AND WAS NEGATIVE.        PMH:  Health Maintenance:  Mammogram - (7/9/2018)  Mammogram Screening - (7/9/2018)  Influenza Vaccination - (9/20/2018)  Couseled on Home Safety - (2/26/2018)  Colonoscopy - (1/16/2018), 8/21-due 26  Colonoscopy Screening - (1/16/2018)  Bone Density Scan - (9/29/2016)  Pneumonia Vaccination - (9/22/2016)  Tetanus Immunization - (9/22/2016)  pt cannot recall if she had this vaccination, no record from pharmacy  Stress Test - 11/11,9/15, 7/21-negative, 4/22-positive  heart cath - 2/12-ok, 4/22-mild to moderate nonobstructive CAD  Colonoscopy - 2/14,8/14,1/18-due 21  EKG - 3/14, 11/17  has Gyn - Dr. Marly Doll  2D ECHO - 9/15  Hemmocult Cards - 10/16-neg x 3  EGD - 1/18, 10/19,-due 22, 8/21-due 24  Prevnar Vaccine - (11/3/2015) [facility]  Medical Problems:  Hypertension, Restless Leg Syndrome, irritable bladder, Depression  Ulcerative Colitis - follows with dr Trinidad Carson  Anemia - follows with dr Dolores Rollins  Colon Polyps, over active bladder, vit D defic, chronic sinus congestion, Osteoarthritis, Diverticulosis, fx  right wrist, Pfo, Hyperlipidemia, Diastolic Dysfunction, Cholelithiasis, fracture right lateral malleolus,  Valvular Heart Disease, Lumbar DDD, Lumbar Spinal Stenosis, Lumbar herniated disc  Paroxysmal atrial fibrillation-8/21-anticoagulated with warfarin  CAD (mild to moderate nonobstructive) per heart cath-4/22     Surgical Hx:  DEON - Non-cancerous  Left breast mass removal - x2-benign  Right carpal tunnel surgery, Rectocele repair, Left knee arthroscopy, Bilateral total hip arthroplasty,  Bladder suspension, Bunion surgery, Bilateral cataract surgery, Repair of torn meniscus left knee,  Posterior-lateral fusion L2-5    Right total knee arthroplasty-  Left total knee arthroplasty-   old records reviewed  FH:  Father:  Heart Disease -  age 80. Mother:  Non Insulin Dependent Diabetes -  age84  Congestive Heart Failure (CHF). Brother 1:  Obesity, Peripheral Vascular Disease (PVD). Maternal Grandmother:  Breast Cancer. SH:  Marital: Legal Status: . retired   Personal Habits: Cigarette Use: Does Not Smoke. Alcohol: Denies alcohol use                Current Outpatient Medications:     lisinopril (PRINIVIL;ZESTRIL) 30 MG tablet, Take 1 tablet by mouth daily, Disp: 90 tablet, Rfl: 1    atorvastatin (LIPITOR) 40 MG tablet, Take 1 tablet by mouth daily, Disp: 90 tablet, Rfl: 1    warfarin (COUMADIN) 2 MG tablet, Take 4 mg on Sat & , Disp: 60 tablet, Rfl: 3    warfarin (COUMADIN) 1 MG tablet, Take 1 tablet by mouth in the morning., Disp: 90 tablet, Rfl: 1    montelukast (SINGULAIR) 10 MG tablet, Take 1 tablet by mouth daily, Disp: 90 tablet, Rfl: 1    azelastine (ASTELIN) 0.1 % nasal spray, 1 spray by Nasal route 2 times daily Use in each nostril as directed, Disp: 60 mL, Rfl: 1    escitalopram (LEXAPRO) 10 MG tablet, Take 1 tablet by mouth daily, Disp: 90 tablet, Rfl: 1    warfarin (COUMADIN) 5 MG tablet, Take 1 tablet by mouth daily, Disp: 90 tablet, Rfl: 1    torsemide (DEMADEX) 20 MG tablet, Take 0.5 tablets by mouth daily, Disp: 90 tablet, Rfl: 1    metoprolol succinate (TOPROL XL) 50 MG extended release tablet, Take 1 tablet by mouth daily, Disp: 90 tablet, Rfl: 3    nitroGLYCERIN (NITROSTAT) 0.4 MG SL tablet, Place 1 tablet under the tongue every 5 minutes as needed for Chest pain up to max of 3 total doses.  If no relief after 1 dose, call 911., Disp: 25 tablet, Rfl: 0    Cyanocobalamin (B-12) 1000 MCG TABS, Take by mouth daily, Disp: , Rfl:     Cholecalciferol 50 MCG (2000 UT) CAPS, Take by mouth daily , Disp: , Rfl:     omeprazole (PRILOSEC) 20 MG delayed release capsule, Take 20 mg by mouth daily , Disp: , Rfl:     Biotin 5000 MCG TABS, Take by mouth daily , Disp: , Rfl:   Allergies   Allergen Reactions    Erythromycin Hives    Penicillin G     Azithromycin     Penicillins Hives    Propoxyphene        Past Medical History:   Diagnosis Date    Anemia     Cholelithiasis     Chronic congestion of paranasal sinus     Colon polyps     Depression     Diastolic dysfunction     Diverticulosis     History of blood transfusion     HTN (hypertension)     Hyperlipidemia 2019    Irritable bladder     Lumbar degenerative disc disease     Lumbar herniated disc     Lumbar spinal stenosis     Major depressive disorder with single episode, in remission ([address] 2019    Osteoarthritis     Overactive bladder     RLS (restless legs syndrome) 2019    Ulcerative colitis [address] [address]     Ulcerative colitis without complications ([facility] [address]     Valvular heart disease     Vitamin D deficiency     Wrist fracture     Right     Past Surgical History:   Procedure Laterality Date    BLADDER SUSPENSION      BREAST LUMPECTOMY Left     x 2, negative    BUNIONECTOMY      CARDIAC CATHETERIZATION  2012    CARPAL TUNNEL RELEASE Right     CATARACT REMOVAL WITH IMPLANT Right     COLONOSCOPY  2014    HYSTERECTOMY, TOTAL ABDOMINAL (CERVIX REMOVED)      non-cancerous    JOINT REPLACEMENT Bilateral     hip    KNEE ARTHROSCOPY Left     RECTOCELE REPAIR       Family History   Problem Relation Age of Onset    Heart Disease Father          age 80    Stroke Father     Diabetes Mother          age 80    Heart Disease Mother         CHF    Other Brother         PVD, obesity    Heart Disease Brother         CHF    Cancer Paternal Grandmother         breast     Social History     Socioeconomic History    Marital status:       Spouse name: Not on file    Number of children: Not on file    Years of education: Not on file    Highest education level: Not on file   Occupational History    Not on file   Tobacco Use    Smoking status: Never    Smokeless tobacco: Never   Vaping Use    Vaping Use: Never used   Substance and Sexual Activity    Alcohol use: Yes     Comment: occasionally    Drug use: No    Sexual activity: Not on file   Other Topics Concern    Not on file   Social History Narrative    Not on file     Social Determinants of Health     Financial Resource Strain: Not on file   Food Insecurity: Not on file   Transportation Needs: Not on file   Physical Activity: Insufficiently Active    Days of Exercise per Week: 3 days    Minutes of Exercise per Session: 30 min   Stress: Not on file   Social Connections: Not on file   Intimate Partner Violence: Not on file   Housing Stability: Not on file       Vitals:    08/31/22 1552   BP: 126/62   Pulse: 60   Temp: 97.6 °F (36.4 °C)   TempSrc: Temporal   SpO2: 98%   Weight: 150 lb 9.6 oz (68.3 kg)   Height: 5' 1\" (1.549 m)       Physical Exam    Constitutional: She is oriented to person, place, and time. She appears well-developed and well-nourished. No distress. Neck: Normal range of motion. Neck supple. No JVD present. No thyromegaly present. Cardiovascular: Normal rate, regular rhythm, normal heart sounds and intact distal pulses. Exam reveals no gallop and no friction rub. No murmur heard. Pulmonary/Chest: Effort normal and breath sounds normal. No respiratory distress. She has no wheezes. She has no rales. Abdominal: Soft. Bowel sounds are normal. She exhibits no distension and no mass. There is no tenderness. Musculoskeletal: Normal range of motion. She exhibits no edema. Neurological: She is alert and oriented to person, place, and time. She displays normal reflexes. No sensory deficit. She exhibits normal muscle tone. Coordination normal.   Skin: Skin is warm and dry. No rash noted. No erythema. She did have very minimal noted hair thinning I could not really pull anything out.   She also had a couple skin lesions that were roughened. 1 of these were to the left scalp and another to the right upper back. She had a small lipomatous lesion to the upper back as well as a skin tag just below the bra line. Psychiatric: She has a normal mood and affect. Her behavior is normal.   Nursing note and vitals reviewed             Assessment and Plan:  Marcia Pritchard was seen today for hypertension and office visit for anticoagulation management. Diagnoses and all orders for this visit:    Essential hypertension  -     Comprehensive Metabolic Panel; Future  -     CBC with Auto Differential; Future  Stable and controlled on current antihypertensive medication    PAF (paroxysmal atrial fibrillation) (HCC)  Quiescent and in sinus rhythm. Anticoagulated. Anticoagulated  -     POCT INR    Major depressive disorder with single episode, in remission ([facility] [address]  Stable and controlled on SSRI    Hyperlipidemia, unspecified hyperlipidemia type  Stable and controlled on statin medication    Stage 3a chronic kidney disease ([facility] [address]  Stable    At high risk for falls    Skin lesion  -     Sanjana Reis MD,  [facility] ([facility])    Hair loss  -     TSH; Future  -     Sanj[facility] Reis MD,  [facility] ([facility])    Other orders  -     lisinopril (PRINIVIL;ZESTRIL) 30 MG tablet; Take 1 tablet by mouth daily  -     atorvastatin (LIPITOR) 40 MG tablet; Take 1 tablet by mouth daily  Plan: I will see her back in 1 month for INR check. Same dose Coumadin. Watch for any bleeding. 3-month regular follow-up visit. Report dermatology for the hair loss and skin lesions. Continue to monitor blood pressure closely. I had her change her lisinopril and metoprolol dosing to lisinopril a.m. metoprolol p.m hopefully for little better blood pressure control. Blood work in 1 to 2 weeks at [facility] office to monitor disease progression and medication use.   Prescription management performed after review of efficacy of medication on her

## 2022-09-13 ENCOUNTER — CARE COORDINATION (OUTPATIENT)
Dept: CARE COORDINATION | Age: 77
End: 2022-09-13

## 2022-09-13 NOTE — CARE COORDINATION
Contacted pt's daughter Bill Navarro, who is listed on pt's current HIPPA form and whose mobile number is listed for pt to discuss and offer care coordination enrollment. Introduced myself, explained my role, and care coordination. Tammie Eastman declined enrollment, reports that pt resides with her and she assists with pt's care. Encouraged Tammie Eastman to contact PCP if any needs arise in the future and ACM would be happy to assist, verbalized understanding.

## 2022-09-23 DIAGNOSIS — I10 ESSENTIAL HYPERTENSION: ICD-10-CM

## 2022-09-23 DIAGNOSIS — L65.9 HAIR LOSS: ICD-10-CM

## 2022-09-23 LAB
ALBUMIN SERPL-MCNC: 4.3 G/DL (ref 3.5–5.2)
ALP BLD-CCNC: 168 U/L (ref 35–104)
ALT SERPL-CCNC: 67 U/L (ref 0–32)
ANION GAP SERPL CALCULATED.3IONS-SCNC: 12 MMOL/L (ref 7–16)
AST SERPL-CCNC: 65 U/L (ref 0–31)
BASOPHILS ABSOLUTE: 0.02 E9/L (ref 0–0.2)
BASOPHILS RELATIVE PERCENT: 0.5 % (ref 0–2)
BILIRUB SERPL-MCNC: 0.4 MG/DL (ref 0–1.2)
BUN BLDV-MCNC: 20 MG/DL (ref 6–23)
CALCIUM SERPL-MCNC: 9.6 MG/DL (ref 8.6–10.2)
CHLORIDE BLD-SCNC: 105 MMOL/L (ref 98–107)
CO2: 27 MMOL/L (ref 22–29)
CREAT SERPL-MCNC: 1 MG/DL (ref 0.5–1)
EOSINOPHILS ABSOLUTE: 0.08 E9/L (ref 0.05–0.5)
EOSINOPHILS RELATIVE PERCENT: 2.1 % (ref 0–6)
GFR AFRICAN AMERICAN: >60
GFR NON-AFRICAN AMERICAN: 54 ML/MIN/1.73
GLUCOSE BLD-MCNC: 87 MG/DL (ref 74–99)
HCT VFR BLD CALC: 34.4 % (ref 34–48)
HEMOGLOBIN: 11 G/DL (ref 11.5–15.5)
IMMATURE GRANULOCYTES #: 0.02 E9/L
IMMATURE GRANULOCYTES %: 0.5 % (ref 0–5)
LYMPHOCYTES ABSOLUTE: 1.18 E9/L (ref 1.5–4)
LYMPHOCYTES RELATIVE PERCENT: 31.2 % (ref 20–42)
MCH RBC QN AUTO: 29.6 PG (ref 26–35)
MCHC RBC AUTO-ENTMCNC: 32 % (ref 32–34.5)
MCV RBC AUTO: 92.5 FL (ref 80–99.9)
MONOCYTES ABSOLUTE: 0.35 E9/L (ref 0.1–0.95)
MONOCYTES RELATIVE PERCENT: 9.3 % (ref 2–12)
NEUTROPHILS ABSOLUTE: 2.13 E9/L (ref 1.8–7.3)
NEUTROPHILS RELATIVE PERCENT: 56.4 % (ref 43–80)
PDW BLD-RTO: 14.3 FL (ref 11.5–15)
PLATELET # BLD: 154 E9/L (ref 130–450)
PMV BLD AUTO: 11.4 FL (ref 7–12)
POTASSIUM SERPL-SCNC: 4.4 MMOL/L (ref 3.5–5)
RBC # BLD: 3.72 E12/L (ref 3.5–5.5)
SODIUM BLD-SCNC: 144 MMOL/L (ref 132–146)
TOTAL PROTEIN: 7.3 G/DL (ref 6.4–8.3)
TSH SERPL DL<=0.05 MIU/L-ACNC: 2.68 UIU/ML (ref 0.27–4.2)
WBC # BLD: 3.8 E9/L (ref 4.5–11.5)

## 2022-09-24 ENCOUNTER — TELEPHONE (OUTPATIENT)
Dept: FAMILY MEDICINE CLINIC | Age: 77
End: 2022-09-24

## 2022-09-24 DIAGNOSIS — R79.89 ELEVATED LFTS: Primary | ICD-10-CM

## 2022-09-24 DIAGNOSIS — D64.9 ANEMIA, UNSPECIFIED TYPE: ICD-10-CM

## 2022-09-24 NOTE — TELEPHONE ENCOUNTER
Liver enzymes have gone up for some reason.   Have her stop any over-the-counter supplements and repeat numbers in 2 weeks please

## 2022-09-28 ENCOUNTER — OFFICE VISIT (OUTPATIENT)
Dept: FAMILY MEDICINE CLINIC | Age: 77
End: 2022-09-28
Payer: MEDICARE

## 2022-09-28 VITALS
DIASTOLIC BLOOD PRESSURE: 64 MMHG | OXYGEN SATURATION: 98 % | TEMPERATURE: 97.8 F | HEART RATE: 67 BPM | WEIGHT: 148.4 LBS | HEIGHT: 61 IN | BODY MASS INDEX: 28.02 KG/M2 | SYSTOLIC BLOOD PRESSURE: 122 MMHG

## 2022-09-28 DIAGNOSIS — I48.0 PAF (PAROXYSMAL ATRIAL FIBRILLATION) (HCC): ICD-10-CM

## 2022-09-28 DIAGNOSIS — I10 ESSENTIAL HYPERTENSION: ICD-10-CM

## 2022-09-28 DIAGNOSIS — R79.89 ELEVATED LFTS: ICD-10-CM

## 2022-09-28 DIAGNOSIS — Z79.01 ANTICOAGULATED: Primary | ICD-10-CM

## 2022-09-28 DIAGNOSIS — R53.83 FATIGUE, UNSPECIFIED TYPE: ICD-10-CM

## 2022-09-28 LAB
INTERNATIONAL NORMALIZATION RATIO, POC: 2.7
PROTHROMBIN TIME, POC: 32.2

## 2022-09-28 PROCEDURE — 1036F TOBACCO NON-USER: CPT | Performed by: INTERNAL MEDICINE

## 2022-09-28 PROCEDURE — G8427 DOCREV CUR MEDS BY ELIG CLIN: HCPCS | Performed by: INTERNAL MEDICINE

## 2022-09-28 PROCEDURE — G8400 PT W/DXA NO RESULTS DOC: HCPCS | Performed by: INTERNAL MEDICINE

## 2022-09-28 PROCEDURE — 1123F ACP DISCUSS/DSCN MKR DOCD: CPT | Performed by: INTERNAL MEDICINE

## 2022-09-28 PROCEDURE — 1090F PRES/ABSN URINE INCON ASSESS: CPT | Performed by: INTERNAL MEDICINE

## 2022-09-28 PROCEDURE — 99213 OFFICE O/P EST LOW 20 MIN: CPT | Performed by: INTERNAL MEDICINE

## 2022-09-28 PROCEDURE — 85610 PROTHROMBIN TIME: CPT | Performed by: INTERNAL MEDICINE

## 2022-09-28 PROCEDURE — G8417 CALC BMI ABV UP PARAM F/U: HCPCS | Performed by: INTERNAL MEDICINE

## 2022-09-28 NOTE — PROGRESS NOTES
[facility]     22  Gaye Cid : 1945 Sex: female  Age: 65 y.o. Chief Complaint   Patient presents with    Office Visit for Anticoagulation Management     1 month INR       HPI    Patient presents today accompanied by her daughter for 1 month follow-up for INR check/anticoagulation management and to review recent labs. INR today was 2.7. No bleeding or thrombotic episodes. Continue same dose Coumadin and recheck INR 1 month. Recent labs looked okay except for liver enzymes were up for the first time for some reason. She states she did take a fall 3 days prior where she missed a step on the porch and went down skinning up her knees. Did not hit her head. No other injury outside of the head and some pain to the right buttock area. She did go to ER and had x-rays of the hip and right knee which were negative for fracture. Skinned up her right knee a little bit and bruised the left side. No bruising to the buttock. States she took Tylenol 3 times a day for couple days 500 mg and wondered if this caused liver enzyme elevation. I told him I doubt that is the case. She does have an order in to repeat labs here upcoming again in follow-up. Also continues to complain of fatigue. Myself and the daughter went over her medications again and I did asked the patient to start taking her Singulair in the evening which is what she was supposed to be doing instead of morning continue taking metoprolol in the evening. And also were going to decrease her Lexapro from 10 mg to 5 mg daily. I asked her to cut the tens in half. I told her we will see if this makes any kind of a difference with the fatigue. We have checked iron levels, vitamin B12 and folate, TSH. She does remain slightly anemic. She has been scoped up and down in the past year.         Review of Systems  Constitutional: Negative for activity change, appetite change, chills, diaphoresis, , fever and unexpected weight change. HENT: Negative  Respiratory: Negative for cough,  and wheezing. Positive for dyspnea on exertion-improved  Cardiovascular: Negative for palpitations and leg swelling. She did have recent positive stress test and recent heart catheterization from-recent PAF on anticoagulation  gastrointestinal: Negative for abdominal pain, blood in stool, constipation, diarrhea, nausea and vomiting. Does have history of ulcerative colitis. -Quiescent at this point  Endocrine: Negative. Genitourinary:  Negative for difficulty urinating, dysuria, frequency and hematuria. No longer seeing urology  Musculoskeletal: Positive for arthralgias   Negative for myalgias. Skin: Hair loss and couple roughened skin lesions allergic/Immunologic: Negative for environmental allergies and immunocompromised state. Neurological: Negative for, weakness, light-headedness, numbness and headaches. Hematological: Negative. psychiatric/Behavioral:        Depression has improved  on her SSRI               REST OF PERTINENT ROS GONE OVER AND WAS NEGATIVE. Constitutional: Negative for activity change, appetite change, chills, diaphoresis, , fever and unexpected weight change. Positive for fatigue    Respiratory: Negative for cough,  and wheezing. Positive for dyspnea on exertion-improved  Cardiovascular: Negative for palpitations and leg swelling. She did have recent positive stress test and recent heart catheterization with mild to moderate nonobstructive CAD. Recent PAF on anticoagulation  gastrointestinal: Negative for abdominal pain, blood in stool, constipation, diarrhea, nausea and vomiting. Does have history of ulcerative colitis. -Quiescent at this point  Endocrine: Negative. Genitourinary:  Negative for difficulty urinating, dysuria, frequency and hematuria. No longer seeing urology  Musculoskeletal: Positive for arthralgias   Negative for myalgias.        Skin: Hair loss and couple roughened skin lesions allergic/Immunologic: Negative for environmental allergies and immunocompromised state. Neurological: Negative for, weakness, light-headedness, numbness and headaches. Hematological: Negative. psychiatric/Behavioral:        Depression has improved  on her SSRI               Current Outpatient Medications:     lisinopril (PRINIVIL;ZESTRIL) 30 MG tablet, Take 1 tablet by mouth daily, Disp: 90 tablet, Rfl: 1    atorvastatin (LIPITOR) 40 MG tablet, Take 1 tablet by mouth daily, Disp: 90 tablet, Rfl: 1    warfarin (COUMADIN) 2 MG tablet, Take 4 mg on Sat & Sunday, Disp: 60 tablet, Rfl: 3    warfarin (COUMADIN) 1 MG tablet, Take 1 tablet by mouth in the morning., Disp: 90 tablet, Rfl: 1    montelukast (SINGULAIR) 10 MG tablet, Take 1 tablet by mouth daily, Disp: 90 tablet, Rfl: 1    azelastine (ASTELIN) 0.1 % nasal spray, 1 spray by Nasal route 2 times daily Use in each nostril as directed, Disp: 60 mL, Rfl: 1    escitalopram (LEXAPRO) 10 MG tablet, Take 1 tablet by mouth daily, Disp: 90 tablet, Rfl: 1    warfarin (COUMADIN) 5 MG tablet, Take 1 tablet by mouth daily, Disp: 90 tablet, Rfl: 1    torsemide (DEMADEX) 20 MG tablet, Take 0.5 tablets by mouth daily, Disp: 90 tablet, Rfl: 1    metoprolol succinate (TOPROL XL) 50 MG extended release tablet, Take 1 tablet by mouth daily, Disp: 90 tablet, Rfl: 3    nitroGLYCERIN (NITROSTAT) 0.4 MG SL tablet, Place 1 tablet under the tongue every 5 minutes as needed for Chest pain up to max of 3 total doses.  If no relief after 1 dose, call 911., Disp: 25 tablet, Rfl: 0    Cyanocobalamin (B-12) 1000 MCG TABS, Take by mouth daily, Disp: , Rfl:     Cholecalciferol 50 MCG (2000 UT) CAPS, Take by mouth daily , Disp: , Rfl:     omeprazole (PRILOSEC) 20 MG delayed release capsule, Take 20 mg by mouth daily , Disp: , Rfl:     Biotin 5000 MCG TABS, Take by mouth daily , Disp: , Rfl:   Allergies   Allergen Reactions    Erythromycin Hives    Penicillin G     Azithromycin     Penicillins Hives    Propoxyphene        Past Medical History:   Diagnosis Date    Anemia     Cholelithiasis     Chronic congestion of paranasal sinus     Colon polyps     Depression     Diastolic dysfunction     Diverticulosis     History of blood transfusion     HTN (hypertension)     Hyperlipidemia 2019    Irritable bladder     Lumbar degenerative disc disease     Lumbar herniated disc     Lumbar spinal stenosis     Major depressive disorder with single episode, in remission ([facility] [address] 2019    Osteoarthritis     Overactive bladder     RLS (restless legs syndrome) 2019    Ulcerative colitis ([facility] [address]     Ulcerative colitis without complications ([facility] [address]     Valvular heart disease     Vitamin D deficiency     Wrist fracture     Right     Past Surgical History:   Procedure Laterality Date    BLADDER SUSPENSION      BREAST LUMPECTOMY Left     x 2, negative    BUNIONECTOMY      CARDIAC CATHETERIZATION  2012    CARPAL TUNNEL RELEASE Right     CATARACT REMOVAL WITH IMPLANT Right     COLONOSCOPY  2014    HYSTERECTOMY, TOTAL ABDOMINAL (CERVIX REMOVED)      non-cancerous    JOINT REPLACEMENT Bilateral     hip    KNEE ARTHROSCOPY Left     RECTOCELE REPAIR       Family History   Problem Relation Age of Onset    Heart Disease Father          age 80    Stroke Father     Diabetes Mother          age 80    Heart Disease Mother         CHF    Other Brother         PVD, obesity    Heart Disease Brother         CHF    Cancer Paternal Grandmother         breast     Social History     Socioeconomic History    Marital status:      Spouse name: Not on file    Number of children: Not on file    Years of education: Not on file    Highest education level: Not on file   Occupational History    Not on file   Tobacco Use    Smoking status: Never    Smokeless tobacco: Never   Vaping Use    Vaping Use: Never used   Substance and Sexual Activity    Alcohol use: Yes     Comment: occasionally    Drug use:  No Sexual activity: Not on file   Other Topics Concern    Not on file   Social History Narrative    Not on file     Social Determinants of Health     Financial Resource Strain: Not on file   Food Insecurity: Not on file   Transportation Needs: Not on file   Physical Activity: Insufficiently Active    Days of Exercise per Week: 3 days    Minutes of Exercise per Session: 30 min   Stress: Not on file   Social Connections: Not on file   Intimate Partner Violence: Not on file   Housing Stability: Not on file       Vitals:    09/28/22 1551   BP: 122/64   Pulse: 67   Temp: 97.8 °F (36.6 °C)   TempSrc: Temporal   SpO2: 98%   Weight: 148 lb 6.4 oz (67.3 kg)   Height: 5' 1\" (1.549 m)       Physical Exam  Vitals and nursing note reviewed. Constitutional:       General: She is not in acute distress. Musculoskeletal:         General: Normal range of motion. Comments: Did examine both knees and right buttock/hip area. Good range of motion. She did scrape up her right knee a little bit but not bad. No evidence of infection. Mild bruising to the left knee. No point tenderness. Neurological:      Mental Status: She is alert. Assessment and Plan:  Michael Conteh was seen today for office visit for anticoagulation management. Diagnoses and all orders for this visit:    Anticoagulated  -     POCT INR    PAF (paroxysmal atrial fibrillation) (HCC)    Elevated LFTs    Essential hypertension    Fatigue, unspecified type    Plan: Get the liver enzyme blood work upcoming. 1 month INR/anticoagulation management. 2-month regular follow-up visit. Same dose Coumadin at 6 mg Monday through Friday and 4 mg Saturday and Sunday. See above body report regarding medication adjustments regarding fatigue. Fall precautions. Notify us of problems in the interim. No follow-ups on file. Seen By:  Caden Montelongo MD      *Document was created using voice recognition software.   Note was reviewed however may contain grammatical errors.

## 2022-10-12 DIAGNOSIS — D64.9 ANEMIA, UNSPECIFIED TYPE: ICD-10-CM

## 2022-10-12 DIAGNOSIS — R79.89 ELEVATED LFTS: ICD-10-CM

## 2022-10-12 LAB
ALBUMIN SERPL-MCNC: 4.4 G/DL (ref 3.5–5.2)
ALP BLD-CCNC: 210 U/L (ref 35–104)
ALT SERPL-CCNC: 55 U/L (ref 0–32)
ANION GAP SERPL CALCULATED.3IONS-SCNC: 12 MMOL/L (ref 7–16)
AST SERPL-CCNC: 51 U/L (ref 0–31)
BASOPHILS ABSOLUTE: 0.02 E9/L (ref 0–0.2)
BASOPHILS RELATIVE PERCENT: 0.5 % (ref 0–2)
BILIRUB SERPL-MCNC: 0.3 MG/DL (ref 0–1.2)
BUN BLDV-MCNC: 23 MG/DL (ref 6–23)
CALCIUM SERPL-MCNC: 9.9 MG/DL (ref 8.6–10.2)
CHLORIDE BLD-SCNC: 105 MMOL/L (ref 98–107)
CO2: 26 MMOL/L (ref 22–29)
CREAT SERPL-MCNC: 1.1 MG/DL (ref 0.5–1)
EOSINOPHILS ABSOLUTE: 0.04 E9/L (ref 0.05–0.5)
EOSINOPHILS RELATIVE PERCENT: 1 % (ref 0–6)
GFR AFRICAN AMERICAN: 58
GFR NON-AFRICAN AMERICAN: 48 ML/MIN/1.73
GLUCOSE BLD-MCNC: 88 MG/DL (ref 74–99)
HCT VFR BLD CALC: 36.1 % (ref 34–48)
HEMOGLOBIN: 11.5 G/DL (ref 11.5–15.5)
IMMATURE GRANULOCYTES #: 0.01 E9/L
IMMATURE GRANULOCYTES %: 0.3 % (ref 0–5)
LYMPHOCYTES ABSOLUTE: 1.26 E9/L (ref 1.5–4)
LYMPHOCYTES RELATIVE PERCENT: 31.8 % (ref 20–42)
MCH RBC QN AUTO: 30.4 PG (ref 26–35)
MCHC RBC AUTO-ENTMCNC: 31.9 % (ref 32–34.5)
MCV RBC AUTO: 95.5 FL (ref 80–99.9)
MONOCYTES ABSOLUTE: 0.33 E9/L (ref 0.1–0.95)
MONOCYTES RELATIVE PERCENT: 8.3 % (ref 2–12)
NEUTROPHILS ABSOLUTE: 2.3 E9/L (ref 1.8–7.3)
NEUTROPHILS RELATIVE PERCENT: 58.1 % (ref 43–80)
PDW BLD-RTO: 13.5 FL (ref 11.5–15)
PLATELET # BLD: 157 E9/L (ref 130–450)
PMV BLD AUTO: 11.3 FL (ref 7–12)
POTASSIUM SERPL-SCNC: 4.1 MMOL/L (ref 3.5–5)
RBC # BLD: 3.78 E12/L (ref 3.5–5.5)
SODIUM BLD-SCNC: 143 MMOL/L (ref 132–146)
TOTAL PROTEIN: 7 G/DL (ref 6.4–8.3)
WBC # BLD: 4 E9/L (ref 4.5–11.5)

## 2022-10-13 ENCOUNTER — TELEPHONE (OUTPATIENT)
Dept: FAMILY MEDICINE CLINIC | Age: 77
End: 2022-10-13

## 2022-10-13 DIAGNOSIS — R79.89 ELEVATED LFTS: Primary | ICD-10-CM

## 2022-10-13 NOTE — TELEPHONE ENCOUNTER
Transaminases are trending downward but alkaline phosphatase has gone up. This may still be related to her fall. I want repeat numbers again in a week. If not continue to trend downward we will do liver ultrasound.   Order is in

## 2022-10-21 DIAGNOSIS — R79.89 ELEVATED LFTS: ICD-10-CM

## 2022-10-21 LAB
ALP BLD-CCNC: 218 U/L (ref 35–104)
ALT SERPL-CCNC: 62 U/L (ref 0–32)
AST SERPL-CCNC: 54 U/L (ref 0–31)

## 2022-10-22 ENCOUNTER — TELEPHONE (OUTPATIENT)
Dept: FAMILY MEDICINE CLINIC | Age: 77
End: 2022-10-22

## 2022-10-22 NOTE — TELEPHONE ENCOUNTER
Liver enzymes continue to be elevated. I see her this next week. Please put in note field to get liver ultrasound and check hepatitis panel.

## 2022-10-24 ENCOUNTER — OFFICE VISIT (OUTPATIENT)
Dept: CARDIOLOGY CLINIC | Age: 77
End: 2022-10-24
Payer: MEDICARE

## 2022-10-24 VITALS
HEIGHT: 60 IN | HEART RATE: 74 BPM | BODY MASS INDEX: 29.39 KG/M2 | DIASTOLIC BLOOD PRESSURE: 60 MMHG | SYSTOLIC BLOOD PRESSURE: 110 MMHG | RESPIRATION RATE: 16 BRPM | WEIGHT: 149.7 LBS

## 2022-10-24 DIAGNOSIS — I10 ESSENTIAL HYPERTENSION: ICD-10-CM

## 2022-10-24 DIAGNOSIS — I48.0 PAF (PAROXYSMAL ATRIAL FIBRILLATION) (HCC): Primary | ICD-10-CM

## 2022-10-24 DIAGNOSIS — I25.10 CAD IN NATIVE ARTERY: ICD-10-CM

## 2022-10-24 DIAGNOSIS — I35.1 NONRHEUMATIC AORTIC VALVE INSUFFICIENCY: ICD-10-CM

## 2022-10-24 DIAGNOSIS — E78.00 PURE HYPERCHOLESTEROLEMIA: ICD-10-CM

## 2022-10-24 PROCEDURE — 1090F PRES/ABSN URINE INCON ASSESS: CPT | Performed by: INTERNAL MEDICINE

## 2022-10-24 PROCEDURE — G8417 CALC BMI ABV UP PARAM F/U: HCPCS | Performed by: INTERNAL MEDICINE

## 2022-10-24 PROCEDURE — 1036F TOBACCO NON-USER: CPT | Performed by: INTERNAL MEDICINE

## 2022-10-24 PROCEDURE — G8400 PT W/DXA NO RESULTS DOC: HCPCS | Performed by: INTERNAL MEDICINE

## 2022-10-24 PROCEDURE — 93000 ELECTROCARDIOGRAM COMPLETE: CPT | Performed by: INTERNAL MEDICINE

## 2022-10-24 PROCEDURE — 1123F ACP DISCUSS/DSCN MKR DOCD: CPT | Performed by: INTERNAL MEDICINE

## 2022-10-24 PROCEDURE — G8484 FLU IMMUNIZE NO ADMIN: HCPCS | Performed by: INTERNAL MEDICINE

## 2022-10-24 PROCEDURE — G8427 DOCREV CUR MEDS BY ELIG CLIN: HCPCS | Performed by: INTERNAL MEDICINE

## 2022-10-24 PROCEDURE — 99214 OFFICE O/P EST MOD 30 MIN: CPT | Performed by: INTERNAL MEDICINE

## 2022-10-24 NOTE — PROGRESS NOTES
Patient Active Problem List   Diagnosis    HTN (hypertension)    Chest pain    Ulcerative colitis without complications ([facility] [address]    Hyperlipidemia    Major depressive disorder with single episode, in remission ([facility])    RLS (restless legs syndrome)    Anemia    VHD (valvular heart disease)    Lightheadedness    Abnormal nuclear stress test    Chronic renal disease, stage III ([facility]) [[id number]]       Current Outpatient Medications   Medication Sig Dispense Refill    lisinopril (PRINIVIL;ZESTRIL) 30 MG tablet Take 1 tablet by mouth daily 90 tablet 1    atorvastatin (LIPITOR) 40 MG tablet Take 1 tablet by mouth daily 90 tablet 1    warfarin (COUMADIN) 2 MG tablet Take 4 mg on Sat & Sunday (Patient taking differently: Take 4 mg by mouth daily Take 4 mg on Sat & Sunday) 60 tablet 3    montelukast (SINGULAIR) 10 MG tablet Take 1 tablet by mouth daily 90 tablet 1    azelastine (ASTELIN) 0.1 % nasal spray 1 spray by Nasal route 2 times daily Use in each nostril as directed 60 mL 1    escitalopram (LEXAPRO) 10 MG tablet Take 1 tablet by mouth daily 90 tablet 1    torsemide (DEMADEX) 20 MG tablet Take 0.5 tablets by mouth daily 90 tablet 1    metoprolol succinate (TOPROL XL) 50 MG extended release tablet Take 1 tablet by mouth daily 90 tablet 3    nitroGLYCERIN (NITROSTAT) 0.4 MG SL tablet Place 1 tablet under the tongue every 5 minutes as needed for Chest pain up to max of 3 total doses. If no relief after 1 dose, call 911. 25 tablet 0    omeprazole (PRILOSEC) 20 MG delayed release capsule Take 20 mg by mouth daily       warfarin (COUMADIN) 5 MG tablet Take 1 tablet by mouth daily (Patient taking differently: Take 6 mg by mouth Five times weekly Monday thru Fiday) 90 tablet 1     No current facility-administered medications for this visit. CC:    Patient is seen in follow up for:  1. PAF (paroxysmal atrial fibrillation) ([facility] [address]    2. Nonrheumatic aortic valve insufficiency - moderate    3. Essential hypertension    4.  Pure hypercholesterolemia    5. CAD in native artery - moderate        HPI:  Notes less shortness of breath on diuretic therapy. No chest pain. Tolerating beta-blocker therapy and anticoagulation well so far. Does have some difficulties with falling about once a month. ROS:   General: No unusual weight gain,  change in exercise tolerance  Skin: No rash or itching  EENT: No vision changes or nosebleeds  Cardiovascular: No orthopnea or paroxysmal nocturnal dyspnea  Respiratory: No cough or hemoptysis  Gastrointestinal: No hematemesis or recent changes in bowel habits  Genitourinary: No hematuria, urgency or frequency  Musculoskeletal: No muscular weakness or joint swelling   Neurologic / Psychiatric: No incoordination or convulsions  Allergic / Immunologic/ Lymphatic / Endocrine: No anemia or bleeding tendency    Social History     Socioeconomic History    Marital status:       Spouse name: Not on file    Number of children: Not on file    Years of education: Not on file    Highest education level: Not on file   Occupational History    Not on file   Tobacco Use    Smoking status: Never    Smokeless tobacco: Never   Vaping Use    Vaping Use: Never used   Substance and Sexual Activity    Alcohol use: Yes     Comment: very occasionally    Drug use: No    Sexual activity: Not on file   Other Topics Concern    Not on file   Social History Narrative    Not on file     Social Determinants of Health     Financial Resource Strain: Not on file   Food Insecurity: Not on file   Transportation Needs: Not on file   Physical Activity: Insufficiently Active    Days of Exercise per Week: 3 days    Minutes of Exercise per Session: 30 min   Stress: Not on file   Social Connections: Not on file   Intimate Partner Violence: Not on file   Housing Stability: Not on file       Family History   Problem Relation Age of Onset    Heart Disease Father          age 80    Stroke Father     Diabetes Mother          age 80    Heart Disease Mother         CHF    Other Brother         PVD, obesity    Heart Disease Brother         CHF    Cancer Paternal Grandmother         breast       Past Medical History:   Diagnosis Date    Anemia     Cholelithiasis     Chronic congestion of paranasal sinus     Colon polyps     Depression     Diastolic dysfunction     Diverticulosis     History of blood transfusion     HTN (hypertension)     Hyperlipidemia 06/20/2019    Irritable bladder     Lumbar degenerative disc disease     Lumbar herniated disc     Lumbar spinal stenosis     Major depressive disorder with single episode, in remission ([facility] [address] 06/20/2019    Osteoarthritis     Overactive bladder     RLS (restless legs syndrome) 06/20/2019    Ulcerative colitis ([facility] [address]     Ulcerative colitis without complications ([facility] 29/14/4897    Valvular heart disease     Vitamin D deficiency     Wrist fracture     Right       PHYSICAL EXAM:  CONSTITUTIONAL:  Well developed, well nourished    Vitals:    10/24/22 1409   BP: 110/60   Pulse: 74   Resp: 16   Weight: 149 lb 11.2 oz (67.9 kg)   Height: 5' (1.524 m)     HEAD & FACE: Normocephalic. Symmetric. EYES: No xanthelasma. Conjunctivae not injected. EARS, NOSE, MOUTH & THROAT: Good dentition. No oral pallor or cyanosis. NECK: No JVD at 30 degrees. No thyromegaly. RESPIRATORY: Clear to auscultation and percussion in all fields. No use of accessory muscle or intercostal retractions. CARDIOVASCULAR: Regular rate and rhythm. No lifts or thrills on palpitation. 1/6 early diastolic murmur heard best right upper sternal border without radiation. Auscultation with normal S1-S2 in intensity and splitting. No carotid bruits. Abdominal aorta not enlarged. Femoral arteries without bruits. Pedal pulses 2+. No edema. ABDOMEN: Soft without hepatic or splenic enlargement. No tenderness. MUSCULOSKELETAL: No kyphosis or scoliosis of the back. Good muscle strength and tone. No muscle atrophy.   Normal gait and ability to undergo exercise stress testing. EXTREMITIES: No clubbing or cyanosis. SKIN: No Xanthomas or ulcerations. NEUROLOGIC: Oriented to time, place and person. Normal mood and affect. LYMPHATIC:  No palpable neck or supraclavicular nodes. No splenomegaly. EKG: the EKG tracing was reviewed and found to reveal: Normal sinus rhythm. Anterior T wave inversions.  ms. New change compared to prior tracing. ASSESSMENT:                                                     ORDERS:       Diagnosis Orders   1. PAF (paroxysmal atrial fibrillation) ([facility])  EKG 12 Lead      2. Nonrheumatic aortic valve insufficiency - moderate        3. Essential hypertension        4. Pure hypercholesterolemia        5. CAD in native artery - moderate        Shortness of breath improved post institution of diuretic therapy. PLAN:   See above orders. Medication reconciliation completed. Old records were reviewed and found to reveal: Creatinine 1.1 and BUN 23 on 10/12/2022  Discussed issues that would prompt earlier evaluation. Same cardiac medications. Given information and discussed Watchmen procedure in view of her gait instability.     Follow-up office visit in 12 months

## 2022-10-26 ENCOUNTER — OFFICE VISIT (OUTPATIENT)
Dept: FAMILY MEDICINE CLINIC | Age: 77
End: 2022-10-26
Payer: MEDICARE

## 2022-10-26 ENCOUNTER — TELEPHONE (OUTPATIENT)
Dept: FAMILY MEDICINE CLINIC | Age: 77
End: 2022-10-26

## 2022-10-26 VITALS
HEART RATE: 61 BPM | HEIGHT: 60 IN | TEMPERATURE: 97.3 F | WEIGHT: 149.8 LBS | OXYGEN SATURATION: 100 % | SYSTOLIC BLOOD PRESSURE: 126 MMHG | DIASTOLIC BLOOD PRESSURE: 70 MMHG | BODY MASS INDEX: 29.41 KG/M2

## 2022-10-26 DIAGNOSIS — R41.0 CONFUSION: ICD-10-CM

## 2022-10-26 DIAGNOSIS — R79.89 LFT ELEVATION: ICD-10-CM

## 2022-10-26 DIAGNOSIS — I10 PRIMARY HYPERTENSION: ICD-10-CM

## 2022-10-26 DIAGNOSIS — R25.1 TREMOR: ICD-10-CM

## 2022-10-26 DIAGNOSIS — D64.9 ANEMIA, UNSPECIFIED TYPE: ICD-10-CM

## 2022-10-26 DIAGNOSIS — E53.8 VITAMIN B 12 DEFICIENCY: ICD-10-CM

## 2022-10-26 DIAGNOSIS — Z79.01 ANTICOAGULATED: Primary | ICD-10-CM

## 2022-10-26 DIAGNOSIS — I48.0 PAF (PAROXYSMAL ATRIAL FIBRILLATION) (HCC): ICD-10-CM

## 2022-10-26 DIAGNOSIS — R10.9 ABDOMINAL PAIN, UNSPECIFIED ABDOMINAL LOCATION: ICD-10-CM

## 2022-10-26 LAB
INTERNATIONAL NORMALIZATION RATIO, POC: 2.7
PROTHROMBIN TIME, POC: 32.3

## 2022-10-26 PROCEDURE — 1123F ACP DISCUSS/DSCN MKR DOCD: CPT | Performed by: INTERNAL MEDICINE

## 2022-10-26 PROCEDURE — 3078F DIAST BP <80 MM HG: CPT | Performed by: INTERNAL MEDICINE

## 2022-10-26 PROCEDURE — 1036F TOBACCO NON-USER: CPT | Performed by: INTERNAL MEDICINE

## 2022-10-26 PROCEDURE — G8417 CALC BMI ABV UP PARAM F/U: HCPCS | Performed by: INTERNAL MEDICINE

## 2022-10-26 PROCEDURE — 99215 OFFICE O/P EST HI 40 MIN: CPT | Performed by: INTERNAL MEDICINE

## 2022-10-26 PROCEDURE — 1090F PRES/ABSN URINE INCON ASSESS: CPT | Performed by: INTERNAL MEDICINE

## 2022-10-26 PROCEDURE — G8427 DOCREV CUR MEDS BY ELIG CLIN: HCPCS | Performed by: INTERNAL MEDICINE

## 2022-10-26 PROCEDURE — G8484 FLU IMMUNIZE NO ADMIN: HCPCS | Performed by: INTERNAL MEDICINE

## 2022-10-26 PROCEDURE — 85610 PROTHROMBIN TIME: CPT | Performed by: INTERNAL MEDICINE

## 2022-10-26 PROCEDURE — G8400 PT W/DXA NO RESULTS DOC: HCPCS | Performed by: INTERNAL MEDICINE

## 2022-10-26 PROCEDURE — 3074F SYST BP LT 130 MM HG: CPT | Performed by: INTERNAL MEDICINE

## 2022-10-26 NOTE — TELEPHONE ENCOUNTER
Regarding: Mom appt  ----- Message from Josselyn Clifford MA sent at 10/26/2022 12:39 PM EDT -----       ----- Message from Bharat Ramirez to Cristiana Chambers MD sent at 10/26/2022 11:35 AM -----   I just had surgery so I will not be coming today. My sister Leanna Even will be bringing her. Couple things I wanted to make doc aware of is her tremors in right leg. Shakes a lot. Also her confusion is getting worse. She doesnt want to talk about it but Im concerned. She can not finish sentences and sometimes unable to comprehend what you tell her. She has had no falls this month but did miss one weekday dose of warafin.    Larissa

## 2022-10-27 DIAGNOSIS — R79.89 LFT ELEVATION: ICD-10-CM

## 2022-10-27 DIAGNOSIS — R41.0 CONFUSION: ICD-10-CM

## 2022-10-27 DIAGNOSIS — E53.8 VITAMIN B 12 DEFICIENCY: ICD-10-CM

## 2022-10-27 DIAGNOSIS — D64.9 ANEMIA, UNSPECIFIED TYPE: ICD-10-CM

## 2022-10-27 LAB
ALBUMIN SERPL-MCNC: 4.4 G/DL (ref 3.5–5.2)
ALP BLD-CCNC: 220 U/L (ref 35–104)
ALT SERPL-CCNC: 52 U/L (ref 0–32)
AST SERPL-CCNC: 44 U/L (ref 0–31)
BILIRUB SERPL-MCNC: 0.4 MG/DL (ref 0–1.2)
BILIRUBIN DIRECT: <0.2 MG/DL (ref 0–0.3)
BILIRUBIN, INDIRECT: ABNORMAL MG/DL (ref 0–1)
FERRITIN: 158 NG/ML
FOLATE: 7.8 NG/ML (ref 4.8–24.2)
IRON SATURATION: 25 % (ref 15–50)
IRON: 67 MCG/DL (ref 37–145)
TOTAL IRON BINDING CAPACITY: 271 MCG/DL (ref 250–450)
TOTAL PROTEIN: 7 G/DL (ref 6.4–8.3)
TSH SERPL DL<=0.05 MIU/L-ACNC: 2.14 UIU/ML (ref 0.27–4.2)
VITAMIN B-12: 720 PG/ML (ref 211–946)

## 2022-10-27 NOTE — TELEPHONE ENCOUNTER
----- Message from Yates Lesch, MD sent at 7/13/2021  4:29 PM EDT -----  Please let patient know that stress test appeared normal.  However, would like to see her back in follow-up in 1 month. Patient called back today regarding needing referral to [facility] [phone] to establish care after his move to that area. Called and LVM with [facility] for fax number to send referral. Told patient and wife that I would send ASAP so they can get scheduled.

## 2022-10-27 NOTE — PROGRESS NOTES
[facility]     10/26/22  Alirio Layne : 1945 Sex: female  Age: 65 y.o. Chief Complaint   Patient presents with    Office Visit for Anticoagulation Management     1 month INR    Hypertension    Tremors    Altered Mental Status       HPI  Patient presents for INR check today/anticoagulation management which turned into a fullvisit which was rather involved. She was brought in by her other daughter today. I did receive an inbox note from first daughter stating she had concerns about patient's tremor which is primarily right leg and also some confusion and inability to comprehend some time. According to the daughter who brought her in the been noticing some this confusion for some time now with the tremor just starting in the Past couple months. No history of same. No family history of tremors. Denies other neurological complaints. I did make some changes to her medication last visit including changing her Singulair to nighttime and decreasing her Lexapro from 10-5. Shelby Jeffries Patient still complains of very significant fatigue and sleepiness throughout the day. Is also had relatively recent increase in liver function tests including alk phos AST and ALT. States she has had some mild right lower quadrant discomfort on occasion. Does not describe upper quadrant pain. still has gallbladder but unsure of appendix. She did have DEON in the past and think it may have been removed then. Feels that nothing makes it better or worse when it comes on. Patient does admit to her confusion  Patient brings in blood pressure numbers from home looks to be in a good range on her current antihypertensive medication. Depression has been stable and controlled on her SSRI for as stated I did decrease the dose last time around 5 mg. They have not noticed a difference. Lipids have been stable and controlled on her statin medication. GERD has been stable and controlled on her PPI.   Her PAF is quiescent and currently in sinus rhythm. She is anticoagulated with an INR today of 2.7 and has been stable for the last few months. I told them to keep same dose and we will recheck it again next month. She has had no bleeding with this. Did see cardiology in the interim and I reviewed their note. Looks to be stable from their standpoint. She follows with GI, orthopedics and now cardiology     Review of Systems      Constitutional: Negative for activity change, appetite change, chills, diaphoresis, , fever and unexpected weight change. HENT: Negative  Respiratory: Negative for cough,  and wheezing. Positive for dyspnea on exertion-improved  Cardiovascular: Negative for palpitations and leg swelling. She did have recent positive stress test and recent heart catheterization ---PAF on anticoagulation  gastrointestinal: Negative for blood in stool, constipation, diarrhea, nausea and vomiting. Does have history of ulcerative colitis. -Quiescent at this point. Does have the intermittent abdominal discomfort as discussed above. Endocrine: Negative. Genitourinary:  Negative for difficulty urinating, dysuria, frequency and hematuria. No longer seeing urology  Musculoskeletal: Positive for arthralgias   Negative for myalgias. Skin: Hair loss and couple roughened skin lesions allergic/Immunologic: Negative for environmental allergies and immunocompromised state. Neurological: Negative for, weakness, light-headedness, numbness and headaches. Hematological: Negative. psychiatric/Behavioral:        Depression has improved  on her SSRI               REST OF PERTINENT ROS GONE OVER AND WAS NEGATIVE.         PMH:  Health Maintenance:  Mammogram - (7/9/2018)  Mammogram Screening - (7/9/2018)  Influenza Vaccination - (9/20/2018)  Couseled on Home Safety - (2/26/2018)  Colonoscopy - (1/16/2018), 8/21-due 26  Colonoscopy Screening - (1/16/2018)  Bone Density Scan - (9/29/2016)  Pneumonia Vaccination - (2016)  Tetanus Immunization - (2016)  pt cannot recall if she had this vaccination, no record from pharmacy  Stress Test - ,9/15, -negative, -positive  heart cath - -ok, -mild to moderate nonobstructive CAD  Colonoscopy - ,,-  EKG - 3/14,   has Gyn - Dr. Radha Kilgore  2D ECHO - 9/15  Hemmocult Cards - 10/16-neg x 3  EGD - , 10/19,-, -  Prevnar Vaccine - (11/3/2015) [facility]  MMSE 10/22-  Medical Problems:  Hypertension, Restless Leg Syndrome, irritable bladder, Depression  Ulcerative Colitis - follows with dr Madiha Billy  Anemia - follows with dr Meghna Conteh Polyps, over active bladder, vit D defic, chronic sinus congestion, Osteoarthritis, Diverticulosis, fx  right wrist, Pfo, Hyperlipidemia, Diastolic Dysfunction, Cholelithiasis, fracture right lateral malleolus,  Valvular Heart Disease, Lumbar DDD, Lumbar Spinal Stenosis, Lumbar herniated disc  Paroxysmal atrial fibrillation--anticoagulated with warfarin  CAD (mild to moderate nonobstructive) per heart cath-     Surgical Hx:  DEON - Non-cancerous  Left breast mass removal - x2-benign  Right carpal tunnel surgery, Rectocele repair, Left knee arthroscopy, Bilateral total hip arthroplasty,  Bladder suspension, Bunion surgery, Bilateral cataract surgery, Repair of torn meniscus left knee,  Posterior-lateral fusion L2-5    Right total knee arthroplasty-  Left total knee arthroplasty-   old records reviewed  FH:  Father:  Heart Disease -  age 80. Mother:  Non Insulin Dependent Diabetes -  age84  Congestive Heart Failure (CHF). Brother 1:  Obesity, Peripheral Vascular Disease (PVD). Maternal Grandmother:  Breast Cancer. SH:  Marital: Legal Status: . retired   Personal Habits: Cigarette Use: Does Not Smoke. Alcohol: Denies alcohol use                Current Outpatient Medications:     lisinopril (PRINIVIL;ZESTRIL) 30 MG tablet, Take 1 tablet by mouth daily, Disp: 90 tablet, Rfl: 1    atorvastatin (LIPITOR) 40 MG tablet, Take 1 tablet by mouth daily, Disp: 90 tablet, Rfl: 1    warfarin (COUMADIN) 2 MG tablet, Take 4 mg on Sat & Sunday (Patient taking differently: Take 4 mg by mouth daily Take 4 mg on Sat & Sunday), Disp: 60 tablet, Rfl: 3    azelastine (ASTELIN) 0.1 % nasal spray, 1 spray by Nasal route 2 times daily Use in each nostril as directed, Disp: 60 mL, Rfl: 1    warfarin (COUMADIN) 5 MG tablet, Take 1 tablet by mouth daily (Patient taking differently: Take 6 mg by mouth Five times weekly Monday thru Fiday), Disp: 90 tablet, Rfl: 1    torsemide (DEMADEX) 20 MG tablet, Take 0.5 tablets by mouth daily, Disp: 90 tablet, Rfl: 1    metoprolol succinate (TOPROL XL) 50 MG extended release tablet, Take 1 tablet by mouth daily, Disp: 90 tablet, Rfl: 3    nitroGLYCERIN (NITROSTAT) 0.4 MG SL tablet, Place 1 tablet under the tongue every 5 minutes as needed for Chest pain up to max of 3 total doses.  If no relief after 1 dose, call 911., Disp: 25 tablet, Rfl: 0    omeprazole (PRILOSEC) 20 MG delayed release capsule, Take 20 mg by mouth daily , Disp: , Rfl:   Allergies   Allergen Reactions    Erythromycin Hives    Penicillin G     Azithromycin     Penicillins Hives    Propoxyphene        Past Medical History:   Diagnosis Date    Anemia     Cholelithiasis     Chronic congestion of paranasal sinus     Colon polyps     Depression     Diastolic dysfunction     Diverticulosis     History of blood transfusion     HTN (hypertension)     Hyperlipidemia 06/20/2019    Irritable bladder     Lumbar degenerative disc disease     Lumbar herniated disc     Lumbar spinal stenosis     Major depressive disorder with single episode, in remission ([address] 06/20/2019    Osteoarthritis     Overactive bladder     RLS (restless legs syndrome) 06/20/2019    Ulcerative colitis ([facility] [address]     Ulcerative colitis without complications ([facility] [address] 63/85/1027    Valvular heart disease     Vitamin D deficiency     Wrist fracture     Right     Past Surgical History:   Procedure Laterality Date    BLADDER SUSPENSION      BREAST LUMPECTOMY Left     x 2, negative    BUNIONECTOMY      CARDIAC CATHETERIZATION  2012    CARPAL TUNNEL RELEASE Right     CATARACT REMOVAL WITH IMPLANT Right     COLONOSCOPY  2014    HYSTERECTOMY, TOTAL ABDOMINAL (CERVIX REMOVED)      non-cancerous    JOINT REPLACEMENT Bilateral     hip    KNEE ARTHROSCOPY Left     RECTOCELE REPAIR       Family History   Problem Relation Age of Onset    Heart Disease Father          age 80    Stroke Father     Diabetes Mother          age 80    Heart Disease Mother         CHF    Other Brother         PVD, obesity    Heart Disease Brother         CHF    Cancer Paternal Grandmother         breast     Social History     Socioeconomic History    Marital status:       Spouse name: Not on file    Number of children: Not on file    Years of education: Not on file    Highest education level: Not on file   Occupational History    Not on file   Tobacco Use    Smoking status: Never    Smokeless tobacco: Never   Vaping Use    Vaping Use: Never used   Substance and Sexual Activity    Alcohol use: Yes     Comment: very occasionally    Drug use: No    Sexual activity: Not on file   Other Topics Concern    Not on file   Social History Narrative    Not on file     Social Determinants of Health     Financial Resource Strain: Not on file   Food Insecurity: Not on file   Transportation Needs: Not on file   Physical Activity: Insufficiently Active    Days of Exercise per Week: 3 days    Minutes of Exercise per Session: 30 min   Stress: Not on file   Social Connections: Not on file   Intimate Partner Violence: Not on file   Housing Stability: Not on file       Vitals:    10/26/22 1524   BP: 126/70   Pulse: 61   Temp: 97.3 °F (36.3 °C)   TempSrc: Temporal   SpO2: 100%   Weight: 149 lb 12.8 oz (67.9 kg)   Height: 5' (1.524 m)       Physical Exam    Constitutional: She is oriented to person, place, and time. She appears well-developed and well-nourished. No distress. Neck: Normal range of motion. Neck supple. No JVD present. No thyromegaly present. Cardiovascular: Normal rate, regular rhythm, normal heart sounds and intact distal pulses. Exam reveals no gallop and no friction rub. No murmur heard. Pulmonary/Chest: Effort normal and breath sounds normal. No respiratory distress. She has no wheezes. She has no rales. Abdominal: Soft. Bowel sounds are normal. She exhibits no distension and no mass. There is very mild tenderness to palpation on the right side. No rebound or guarding. Musculoskeletal: Normal range of motion. She exhibits no edema. Neurological: She is drowsy appearing and oriented to person, place, and time. She displays normal reflexes. No sensory deficit. She exhibits normal muscle tone. Coordination normal.  Complete neurological examination was performed and unremarkable outside of her slowness to respond some confusion with directions. I did not notice a tremor today. Daughter states is typically the leg and the arm on the right side. I did not find any cogwheeling or shuffling gait as she walked down the [place]. Skin: Skin is warm and dry. No rash noted. No erythema. She did have very minimal noted hair thinning I could not really pull anything out. She also had a couple skin lesions that were roughened. 1 of these were to the left scalp and another to the right upper back. She had a small lipomatous lesion to the upper back as well as a skin tag just below the bra line.-She was sent to dermatology  Psychiatric: She has a flat affect. Somewhat slow to respond to questions. Nursing note and vitals reviewed              Assessment and Plan:  Chetna Zaidi was seen today for office visit for anticoagulation management, hypertension, tremors and altered mental status.     Diagnoses and all orders for this visit:    Anticoagulated  -     POCT INR    Tremor  -     CT HEAD WO CONTRAST; Future  -     AFL - Shanae Maldonado MD, Neurology, [place]    Confusion  -     [address] [place] [address]; Future  -     AFL - Shanae Maldonado MD, Neurology, [place]  -     TSH; Future    Abdominal pain, unspecified abdominal location  -     US ABDOMEN COMPLETE; Future    PAF (paroxysmal atrial fibrillation) (HCC)    Vitamin B 12 deficiency  -     Vitamin B12 & Folate; Future    Primary hypertension    Anemia, unspecified type  -     Iron and TIBC; Future  -     Ferritin; Future    LFT elevation  -     Hepatitis Panel, Acute; Future  -     Hepatic Function Panel; Future    : Several things to be done today. We did do Mini-Mental status exam which demonstrated 24 out of 30. I will set her up with CT of the head and complete abdominal ultrasound. We will get hepatitis panel and hepatic panel as well as iron B12 folate and TSH. We will stop her Singulair and wean off her Lexapro. Continue to monitor blood pressure. We will see her back in the next month. Same dose Coumadin. Fall precautions. Neurology referral.  Notify us of problems in the interim. We will see her back in 1 month for regular follow-up visit. Encounter today with face-to-face time, answering questions for patient and daughter, review of previous notes and consultation, review of labs, prescription management, coordination of care, ordering test, post visit chart review and EHR documentation was 45 minutes. Return for keep appt. Seen By:  Pilar Jiménez MD      *Document was created using voice recognition software. Note was reviewed however may contain grammatical errors.

## 2022-10-28 ENCOUNTER — TELEPHONE (OUTPATIENT)
Dept: FAMILY MEDICINE CLINIC | Age: 77
End: 2022-10-28

## 2022-10-28 LAB
HAV IGM SER IA-ACNC: NORMAL
HEPATITIS B CORE IGM ANTIBODY: NORMAL
HEPATITIS B SURFACE ANTIGEN INTERPRETATION: NORMAL
HEPATITIS C ANTIBODY INTERPRETATION: NORMAL

## 2022-10-29 NOTE — TELEPHONE ENCOUNTER
Liver studies have come down very slightly. Hepatitis panel is negative. Rest of labs are stable. Awaiting CT of head and abdominal ultrasound.

## 2022-11-15 ENCOUNTER — TELEPHONE (OUTPATIENT)
Dept: FAMILY MEDICINE CLINIC | Age: 77
End: 2022-11-15

## 2022-11-15 LAB
ANION GAP SERPL CALCULATED.3IONS-SCNC: 9 MEQ/L (ref 3–11)
BUN BLDV-MCNC: 27 MG/DL (ref 8–21)
CALCIUM SERPL-MCNC: 9.4 MG/DL (ref 8.5–10.5)
CHLORIDE BLD-SCNC: 104 MEQ/L (ref 98–107)
CHOLESTEROL: 157 MG/DL (ref 140–200)
CO2: 28 MEQ/L (ref 21–31)
CREAT SERPL-MCNC: 1.1 MG/DL (ref 0.4–1)
CREATININE + EGFR PANEL: 58 ML/MIN
GFR NON-AFRICAN AMERICAN: 48 ML/MIN
GLUCOSE BLD-MCNC: 92 MG/DL (ref 70–99)
HDLC SERPL-MCNC: 50 MG/DL (ref 35–85)
LDL CHOLESTEROL: 73 MG/DL
LDL/HDL RATIO: 1.5 RATIO
POTASSIUM SERPL-SCNC: 4.1 MEQ/L (ref 3.6–5)
SODIUM BLD-SCNC: 141 MEQ/L (ref 135–145)
TRIGL SERPL-MCNC: 119 MG/DL (ref 41–189)

## 2022-11-15 NOTE — TELEPHONE ENCOUNTER
CT scan of head without acute findings. Ultrasound of abdomen was negative. Keep appointment.   Speak to daughter's

## 2022-11-16 ENCOUNTER — TELEPHONE (OUTPATIENT)
Dept: FAMILY MEDICINE CLINIC | Age: 77
End: 2022-11-16

## 2022-11-16 NOTE — TELEPHONE ENCOUNTER
An addendum was sent over by the radiologist on the abdominal ultrasound stating the multiple stones were seen in the gallbladder without any wall thickening or surrounding inflammation. Just keep appointment and will discuss. Speak to daughter's.

## 2022-11-30 ENCOUNTER — OFFICE VISIT (OUTPATIENT)
Dept: FAMILY MEDICINE CLINIC | Age: 77
End: 2022-11-30
Payer: MEDICARE

## 2022-11-30 VITALS
HEART RATE: 80 BPM | WEIGHT: 151.2 LBS | TEMPERATURE: 98.2 F | OXYGEN SATURATION: 97 % | HEIGHT: 60 IN | BODY MASS INDEX: 29.68 KG/M2

## 2022-11-30 DIAGNOSIS — R05.1 ACUTE COUGH: ICD-10-CM

## 2022-11-30 DIAGNOSIS — I48.0 PAF (PAROXYSMAL ATRIAL FIBRILLATION) (HCC): ICD-10-CM

## 2022-11-30 DIAGNOSIS — R41.0 CONFUSION: ICD-10-CM

## 2022-11-30 DIAGNOSIS — R79.89 LFT ELEVATION: ICD-10-CM

## 2022-11-30 DIAGNOSIS — R09.89 CHEST CONGESTION: ICD-10-CM

## 2022-11-30 DIAGNOSIS — E78.5 HYPERLIPIDEMIA, UNSPECIFIED HYPERLIPIDEMIA TYPE: ICD-10-CM

## 2022-11-30 DIAGNOSIS — F32.5 MAJOR DEPRESSIVE DISORDER WITH SINGLE EPISODE, IN REMISSION (HCC): ICD-10-CM

## 2022-11-30 DIAGNOSIS — Z79.01 ANTICOAGULATED: Primary | ICD-10-CM

## 2022-11-30 DIAGNOSIS — I10 ESSENTIAL HYPERTENSION: ICD-10-CM

## 2022-11-30 LAB
ALBUMIN SERPL-MCNC: 4.5 G/DL (ref 3.5–5.2)
ALP BLD-CCNC: 220 U/L (ref 35–104)
ALT SERPL-CCNC: 49 U/L (ref 0–32)
ANION GAP SERPL CALCULATED.3IONS-SCNC: 15 MMOL/L (ref 7–16)
AST SERPL-CCNC: 47 U/L (ref 0–31)
BILIRUB SERPL-MCNC: 0.4 MG/DL (ref 0–1.2)
BUN BLDV-MCNC: 20 MG/DL (ref 6–23)
CALCIUM SERPL-MCNC: 9.8 MG/DL (ref 8.6–10.2)
CHLORIDE BLD-SCNC: 98 MMOL/L (ref 98–107)
CO2: 27 MMOL/L (ref 22–29)
CREAT SERPL-MCNC: 1.1 MG/DL (ref 0.5–1)
GFR SERPL CREATININE-BSD FRML MDRD: 51 ML/MIN/1.73
GLUCOSE BLD-MCNC: 105 MG/DL (ref 74–99)
INFLUENZA A ANTIBODY: NORMAL
INFLUENZA B ANTIBODY: NORMAL
INTERNATIONAL NORMALIZATION RATIO, POC: 2.4
Lab: NORMAL
PERFORMING INSTRUMENT: NORMAL
POTASSIUM SERPL-SCNC: 3.8 MMOL/L (ref 3.5–5)
PROTHROMBIN TIME, POC: 29.1
QC PASS/FAIL: NORMAL
SARS-COV-2, POC: NORMAL
SODIUM BLD-SCNC: 140 MMOL/L (ref 132–146)
TOTAL PROTEIN: 7.3 G/DL (ref 6.4–8.3)

## 2022-11-30 PROCEDURE — 87804 INFLUENZA ASSAY W/OPTIC: CPT | Performed by: INTERNAL MEDICINE

## 2022-11-30 PROCEDURE — 1090F PRES/ABSN URINE INCON ASSESS: CPT | Performed by: INTERNAL MEDICINE

## 2022-11-30 PROCEDURE — 1123F ACP DISCUSS/DSCN MKR DOCD: CPT | Performed by: INTERNAL MEDICINE

## 2022-11-30 PROCEDURE — G8417 CALC BMI ABV UP PARAM F/U: HCPCS | Performed by: INTERNAL MEDICINE

## 2022-11-30 PROCEDURE — G8428 CUR MEDS NOT DOCUMENT: HCPCS | Performed by: INTERNAL MEDICINE

## 2022-11-30 PROCEDURE — 99214 OFFICE O/P EST MOD 30 MIN: CPT | Performed by: INTERNAL MEDICINE

## 2022-11-30 PROCEDURE — 1036F TOBACCO NON-USER: CPT | Performed by: INTERNAL MEDICINE

## 2022-11-30 PROCEDURE — 87426 SARSCOV CORONAVIRUS AG IA: CPT | Performed by: INTERNAL MEDICINE

## 2022-11-30 PROCEDURE — 85610 PROTHROMBIN TIME: CPT | Performed by: INTERNAL MEDICINE

## 2022-11-30 PROCEDURE — G8484 FLU IMMUNIZE NO ADMIN: HCPCS | Performed by: INTERNAL MEDICINE

## 2022-11-30 PROCEDURE — G8400 PT W/DXA NO RESULTS DOC: HCPCS | Performed by: INTERNAL MEDICINE

## 2022-11-30 RX ORDER — DOXYCYCLINE HYCLATE 100 MG
100 TABLET ORAL 2 TIMES DAILY
Qty: 10 TABLET | Refills: 0 | Status: SHIPPED | OUTPATIENT
Start: 2022-11-30 | End: 2022-12-05

## 2022-11-30 RX ORDER — TORSEMIDE 20 MG/1
10 TABLET ORAL DAILY
Qty: 90 TABLET | Refills: 1 | Status: SHIPPED | OUTPATIENT
Start: 2022-11-30

## 2022-11-30 NOTE — PROGRESS NOTES
[address] [place]     22  Cassia Pablo : 1945 Sex: female  Age: 65 y.o. Chief Complaint   Patient presents with    Office Visit for Anticoagulation Management     Monthly INR check    Hypertension     3 months       HPI  Presents today accompanied by daughter for follow-up visit on her medical problems as well as INR check. .  Reviewed my last note which was rather complicated. Since I have seen her we have referred her to neurology for memory issues and difficulty getting words out. She has not seen them yet but has an appointment. CT of the head demonstrating nothing acute. We also did abdominal ultrasound because of elevated LFTs and this did show gallbladder with stones   With no wall thickening or surrounding inflammation. Everything else was unremarkable. She is having no further abdominal discomfort. I told her I was going to repeat liver enzymes today and if not continuing to trend downward was going to refer her to GI. Did do Mini-Mental status on her last time she received 24-1/2 points out of 30. I told daughter this was very marginal.  Patient brings in blood pressure numbers from home looks to be in a good range on her current antihypertensive medication. Daughter feels depression may have become a little bit active since we stopped her Lexapro. I told her I want to leave her off of this for the time being until I saw what her liver enzymes are doing. She also remains off of her Singulair. Lipids have been stable and controlled on her statin medication. GERD has been stable and controlled on her PPI. Her PAF is quiescent and currently in sinus rhythm. She is anticoagulated with an INR today of 2.4 and has been stable for the last few months. I told them to keep same dose and we will recheck it again next month. She has had no bleeding with this  We do mention that she has been coughing over the last couple days bringing up a little bit of yellow mucus.   No fever or chills. She has tried Mucinex with some relief but did not try the antihistamine recommended which I asked her to do. Denies shortness of breath with this. No recent exposures. Denies fever or chills. Has been vaccinated to [employer] Cristinestephan Man She follows with GI, orthopedics and now cardiology soon neurology     Review of Systems    Constitutional: Negative for activity change, appetite change, chills, diaphoresis, , fever and unexpected weight change. HENT: Negative  Respiratory: Negative for cough,  and wheezing. Positive for dyspnea on exertion-improved--see above respiratory complaints. Cardiovascular: Negative for palpitations and leg swelling. She did have recent positive stress test and recent heart catheterization ---PAF on anticoagulation  gastrointestinal: Negative for blood in stool, constipation, diarrhea, nausea and vomiting. Does have history of ulcerative colitis. -Quiescent at this point. Does have the intermittent abdominal discomfort as discussed above.-Abdominal discomfort resolved  Endocrine: Negative. Genitourinary:  Negative for difficulty urinating, dysuria, frequency and hematuria. No longer seeing urology  Musculoskeletal: Positive for arthralgias   Negative for myalgias. Skin: Hair loss and couple roughened skin lesions allergic/Immunologic: Negative for environmental allergies and immunocompromised state. Neurological: Negative for, weakness, light-headedness, numbness and headaches. Family has complained of some confusion and difficulty getting words out. She has been set up with neurology. Hematological: Negative. psychiatric/Behavioral:        Depression has had some mild recurrence off of her Lexapro              REST OF PERTINENT ROS GONE OVER AND WAS NEGATIVE.      PMH:  Health Maintenance:  Mammogram - (7/9/2018)  Mammogram Screening - (7/9/2018)  Influenza Vaccination - (9/20/2018)  Couseled on Home Safety - (2/26/2018)  Colonoscopy - (1/16/2018), -due   Colonoscopy Screening - (2018)  Bone Density Scan - (2016)  Pneumonia Vaccination - (2016)  Tetanus Immunization - (2016)  pt cannot recall if she had this vaccination, no record from pharmacy  Stress Test - ,9/15, -negative, -positive  heart cath - -ok, -mild to moderate nonobstructive CAD  Colonoscopy - ,,-  EKG - 3/14,   has Gyn - Dr. Gerardo Martins  2D ECHO - 9/15  Hemmocult Cards - 10/16-neg x 3  EGD - , 10/19,-, -  Prevnar Vaccine - (11/3/2015) [facility]  MMSE 10/22-  Medical Problems:  Hypertension, Restless Leg Syndrome, irritable bladder, Depression  Ulcerative Colitis - follows with dr Galilea Yang  Anemia - follows with dr Leilani Tam Polyps, over active bladder, vit D defic, chronic sinus congestion, Osteoarthritis, Diverticulosis, fx  right wrist, Pfo, Hyperlipidemia, Diastolic Dysfunction, Cholelithiasis, fracture right lateral malleolus,  Valvular Heart Disease, Lumbar DDD, Lumbar Spinal Stenosis, Lumbar herniated disc  Paroxysmal atrial fibrillation--anticoagulated with warfarin  CAD (mild to moderate nonobstructive) per heart cath-     Surgical Hx:  DEON - Non-cancerous  Left breast mass removal - x2-benign  Right carpal tunnel surgery, Rectocele repair, Left knee arthroscopy, Bilateral total hip arthroplasty,  Bladder suspension, Bunion surgery, Bilateral cataract surgery, Repair of torn meniscus left knee,  Posterior-lateral fusion L2-5    Right total knee arthroplasty-  Left total knee arthroplasty-   old records reviewed  FH:  Father:  Heart Disease -  age 80. Mother:  Non Insulin Dependent Diabetes -  age84  Congestive Heart Failure (CHF). Brother 1:  Obesity, Peripheral Vascular Disease (PVD). Maternal Grandmother:  Breast Cancer. SH:  Marital: Legal Status: . retired   Personal Habits: Cigarette Use: Does Not Smoke. Alcohol: Denies alcohol use Current Outpatient Medications:     torsemide (DEMADEX) 20 MG tablet, Take 0.5 tablets by mouth daily, Disp: 90 tablet, Rfl: 1    doxycycline hyclate (VIBRA-TABS) 100 MG tablet, Take 1 tablet by mouth 2 times daily for 5 days, Disp: 10 tablet, Rfl: 0    warfarin (COUMADIN) 2 MG tablet, 4mg daily or as directed, Disp: 180 tablet, Rfl: 0    lisinopril (PRINIVIL;ZESTRIL) 30 MG tablet, Take 1 tablet by mouth daily, Disp: 90 tablet, Rfl: 1    atorvastatin (LIPITOR) 40 MG tablet, Take 1 tablet by mouth daily, Disp: 90 tablet, Rfl: 1    azelastine (ASTELIN) 0.1 % nasal spray, 1 spray by Nasal route 2 times daily Use in each nostril as directed, Disp: 60 mL, Rfl: 1    warfarin (COUMADIN) 5 MG tablet, Take 1 tablet by mouth daily (Patient taking differently: Take 6 mg by mouth Five times weekly Monday thru Fiday), Disp: 90 tablet, Rfl: 1    metoprolol succinate (TOPROL XL) 50 MG extended release tablet, Take 1 tablet by mouth daily, Disp: 90 tablet, Rfl: 3    nitroGLYCERIN (NITROSTAT) 0.4 MG SL tablet, Place 1 tablet under the tongue every 5 minutes as needed for Chest pain up to max of 3 total doses.  If no relief after 1 dose, call 911., Disp: 25 tablet, Rfl: 0    omeprazole (PRILOSEC) 20 MG delayed release capsule, Take 20 mg by mouth daily , Disp: , Rfl:   Allergies   Allergen Reactions    Erythromycin Hives    Penicillin G     Azithromycin     Penicillins Hives    Propoxyphene        Past Medical History:   Diagnosis Date    Anemia     Cholelithiasis     Chronic congestion of paranasal sinus     Colon polyps     Depression     Diastolic dysfunction     Diverticulosis     History of blood transfusion     HTN (hypertension)     Hyperlipidemia 06/20/2019    Irritable bladder     Lumbar degenerative disc disease     Lumbar herniated disc     Lumbar spinal stenosis     Major [facility] disorder with single episode[facility] 06/20/2019    Osteoarthritis     Overactive bladder     RLS (restless legs syndrome) 2019    Ulcerative colitis ([facility]     Ulcerative colitis without complications ([facility]     Valvular heart disease     Vitamin D deficiency     Wrist fracture     Right     Past Surgical History:   Procedure Laterality Date    BLADDER SUSPENSION      BREAST LUMPECTOMY Left     x 2, negative    BUNIONECTOMY      CARDIAC CATHETERIZATION  2012    CARPAL TUNNEL RELEASE Right     CATARACT REMOVAL WITH IMPLANT Right     COLONOSCOPY  2014    HYSTERECTOMY, TOTAL ABDOMINAL (CERVIX REMOVED)      non-cancerous    JOINT REPLACEMENT Bilateral     hip    KNEE ARTHROSCOPY Left     RECTOCELE REPAIR       Family History   Problem Relation Age of Onset    Heart Disease Father          age 80    Stroke Father     Diabetes Mother          age 80    Heart Disease Mother         CHF    Other Brother         PVD, obesity    Heart Disease Brother         CHF    Cancer Paternal Grandmother         breast     Social History     Socioeconomic History    Marital status:       Spouse name: Not on file    Number of children: Not on file    Years of education: Not on file    Highest education level: Not on file   Occupational History    Not on file   Tobacco Use    Smoking status: Never    Smokeless tobacco: Never   Vaping Use    Vaping Use: Never used   Substance and Sexual Activity    Alcohol use: Yes     Comment: very occasionally    Drug use: No    Sexual activity: Not on file   Other Topics Concern    Not on file   Social History Narrative    Not on file     Social Determinants of Health     Financial Resource Strain: Not on file   Food Insecurity: Not on file   Transportation Needs: Not on file   Physical Activity: Insufficiently Active    Days of Exercise per Week: 3 days    Minutes of Exercise per Session: 30 min   Stress: Not on file   Social Connections: Not on file   Intimate Partner Violence: Not on file   Housing Stability: Not on file       Vitals:    22 1539   Pulse: 80   Temp: 98.2 °F (36.8 °C)   TempSrc: Temporal   SpO2: 97%   Weight: 151 lb 3.2 oz (68.6 kg)   Height: 5' (1.524 m)       Physical Exam     Constitutional: She is oriented to person, place, and time. She appears well-developed and well-nourished. No distress. HEENT: Throat not injected. Clear mucus draining posterior pharynx. Neck: Normal range of motion. Neck supple. No JVD present. No thyromegaly present. Cardiovascular: Normal rate, regular rhythm, normal heart sounds and intact distal pulses. Exam reveals no gallop and no friction rub. No murmur heard. Pulmonary/Chest: Effort normal and breath sounds normal. No respiratory distress. She has no wheezes. She has no rales. Abdominal: Soft. Bowel sounds are normal. She exhibits no distension and no mass. No pain to palpation. Musculoskeletal: Normal range of motion. She exhibits no edema. Neurological: She is drowsy appearing and oriented to person, place, and time. She displays normal reflexes. No sensory deficit. She exhibits normal muscle tone. Coordination normal.  Complete neurological examination not performed today. Please see last visit under neurological   skin: Skin is warm and dry. No rash noted. No erythema. She did have very minimal noted hair thinning I could not really pull anything out. She also had a couple skin lesions that were roughened. 1 of these were to the left scalp and another to the right upper back. She had a small lipomatous lesion to the upper back as well as a skin tag just below the bra line.-She was sent to dermatology  Psychiatric: She has a flat affect. Somewhat slow to respond to questions. Nursing note and vitals reviewed             Assessment and Plan:  Evangelina Conti was seen today for office visit for anticoagulation management and hypertension.     Diagnoses and all orders for this visit:    Anticoagulated  -     POCT INR  Stable without bleeding    Confusion  Stable    PAF (paroxysmal atrial fibrillation) [facility])  Currently regular rhythm with rate control and anticoagulation    LFT elevation  -     Comprehensive Metabolic Panel; Future  Continue to evaluate      Essential hypertension  Stable and controlled on antihypertensive medication      Major depressive disorder with single episode, in remission ([address]  Some worsening since stopping her SSRI and may need to restart. Hyperlipidemia, unspecified hyperlipidemia type    Acute cough  -     POCT Influenza A/B  -     POCT COVID-19, Antigen    Chest congestion  -     POCT Influenza A/B  -     POCT COVID-19, Antigen    Other orders  -     torsemide (DEMADEX) 20 MG tablet; Take 0.5 tablets by mouth daily  -     doxycycline hyclate (VIBRA-TABS) 100 MG tablet; Take 1 tablet by mouth 2 times daily for 5 days    Plan: We will check COVID rapid antigen and rapid flu-both negative. I asked her to continue with the Mucinex and antihistamine for 2 or 3 more days if not resolving symptoms I gave written prescription for doxycycline-warned of potential side effects. Probiotic. Push fluids. Told her it will potentially raise the INR and only gave her 5 days worth. Notify us if not improving. Blood work to monitor disease progression and medication use. Prescription management performed after review of efficacy of medication on her current medical problems. If liver enzymes not improving will send to GI. Follow-up with neurology. Follow-up with other consultants. Notify us of problems in the interim. We will see back in 1 month for INR check. Stay on same dose Coumadin for now. 2-month follow-up with new physician to establish and follow-up. Return for keep appt. Seen By:  Rene Sebastian MD      *Document was created using voice recognition software. Note was reviewed however may contain grammatical errors.

## 2022-12-01 ENCOUNTER — TELEPHONE (OUTPATIENT)
Dept: FAMILY MEDICINE CLINIC | Age: 77
End: 2022-12-01

## 2022-12-01 DIAGNOSIS — R74.8 ELEVATED ALKALINE PHOSPHATASE LEVEL: ICD-10-CM

## 2022-12-01 DIAGNOSIS — R79.89 LFT ELEVATION: Primary | ICD-10-CM

## 2022-12-01 DIAGNOSIS — R79.89 ELEVATED LFTS: Primary | ICD-10-CM

## 2022-12-01 DIAGNOSIS — R74.8 ELEVATED SERUM GGT LEVEL: ICD-10-CM

## 2022-12-01 LAB — GAMMA GLUTAMYL TRANSFERASE: 166 U/L (ref 6–42)

## 2022-12-01 RX ORDER — ESCITALOPRAM OXALATE 10 MG/1
10 TABLET ORAL DAILY
Qty: 30 TABLET | Refills: 3 | Status: SHIPPED | OUTPATIENT
Start: 2022-12-01

## 2022-12-02 NOTE — TELEPHONE ENCOUNTER
Sent response through [id number] (that is how the request was initiated). Faxing order to [facility].

## 2022-12-02 NOTE — TELEPHONE ENCOUNTER
I feel there is most likely a component of mild dementia here. Certainly depression may be compounding all of this and I am going to put her back on her Lexapro at 10 mg daily (sent to [facility]). They need to make sure she follows up with neurology who I referred her to. Liver enzymes have all come back in a similar elevated range as previous. I did speak with one of the radiologist and I feel patient should have what is called an MRCP to evaluate the liver and the ducts draining the liver and gallbladder. Once this is done I can determine who to refer her to for liver situation.   Order is in for MRCP at [facility].

## 2022-12-17 ENCOUNTER — TELEPHONE (OUTPATIENT)
Dept: FAMILY MEDICINE CLINIC | Age: 77
End: 2022-12-17

## 2022-12-17 DIAGNOSIS — R74.8 ELEVATED SERUM GGT LEVEL: ICD-10-CM

## 2022-12-17 DIAGNOSIS — R79.89 ELEVATED LFTS: Primary | ICD-10-CM

## 2022-12-18 NOTE — TELEPHONE ENCOUNTER
Outside of gallstones MRCP looked normal.  I am going to refer her to gastroenterology/hepatology. Referral into [facility]  Keep upcoming appointment with me and we will recheck numbers again.

## 2022-12-20 NOTE — TELEPHONE ENCOUNTER
I tried to reach the patient again today, but left a message for her daughter, reminding her of the appointment with Dr Lana Mcburney. He has a referrel to discuss with her.

## 2022-12-21 ENCOUNTER — OFFICE VISIT (OUTPATIENT)
Dept: FAMILY MEDICINE CLINIC | Age: 77
End: 2022-12-21

## 2022-12-21 VITALS
WEIGHT: 156 LBS | TEMPERATURE: 97.2 F | HEIGHT: 60 IN | BODY MASS INDEX: 30.63 KG/M2 | HEART RATE: 56 BPM | OXYGEN SATURATION: 97 % | SYSTOLIC BLOOD PRESSURE: 132 MMHG | DIASTOLIC BLOOD PRESSURE: 62 MMHG

## 2022-12-21 DIAGNOSIS — R25.1 TREMOR: ICD-10-CM

## 2022-12-21 DIAGNOSIS — R41.0 CONFUSION: ICD-10-CM

## 2022-12-21 DIAGNOSIS — I48.0 PAF (PAROXYSMAL ATRIAL FIBRILLATION) (HCC): ICD-10-CM

## 2022-12-21 DIAGNOSIS — N18.31 STAGE 3A CHRONIC KIDNEY DISEASE (HCC): ICD-10-CM

## 2022-12-21 DIAGNOSIS — R79.89 ELEVATED LFTS: ICD-10-CM

## 2022-12-21 DIAGNOSIS — I10 ESSENTIAL HYPERTENSION: ICD-10-CM

## 2022-12-21 DIAGNOSIS — Z79.01 ANTICOAGULATED: Primary | ICD-10-CM

## 2022-12-21 DIAGNOSIS — F32.5 MAJOR DEPRESSIVE DISORDER WITH SINGLE EPISODE, IN REMISSION (HCC): ICD-10-CM

## 2022-12-21 DIAGNOSIS — Z91.81 AT HIGH RISK FOR FALLS: ICD-10-CM

## 2022-12-21 DIAGNOSIS — E78.5 HYPERLIPIDEMIA, UNSPECIFIED HYPERLIPIDEMIA TYPE: ICD-10-CM

## 2022-12-21 LAB
INTERNATIONAL NORMALIZATION RATIO, POC: 2.6
PROTHROMBIN TIME, POC: 31.3

## 2022-12-21 ASSESSMENT — PATIENT HEALTH QUESTIONNAIRE - PHQ9
1. LITTLE INTEREST OR PLEASURE IN DOING THINGS: 0
SUM OF ALL RESPONSES TO PHQ QUESTIONS 1-9: 0
7. TROUBLE CONCENTRATING ON THINGS, SUCH AS READING THE NEWSPAPER OR WATCHING TELEVISION: 0
5. POOR APPETITE OR OVEREATING: 0
SUM OF ALL RESPONSES TO PHQ9 QUESTIONS 1 & 2: 0
9. THOUGHTS THAT YOU WOULD BE BETTER OFF DEAD, OR OF HURTING YOURSELF: 0
SUM OF ALL RESPONSES TO PHQ QUESTIONS 1-9: 0
10. IF YOU CHECKED OFF ANY PROBLEMS, HOW DIFFICULT HAVE THESE PROBLEMS MADE IT FOR YOU TO DO YOUR WORK, TAKE CARE OF THINGS AT HOME, OR GET ALONG WITH OTHER PEOPLE: 0
SUM OF ALL RESPONSES TO PHQ QUESTIONS 1-9: 0
2. FEELING DOWN, DEPRESSED OR HOPELESS: 0
8. MOVING OR SPEAKING SO SLOWLY THAT OTHER PEOPLE COULD HAVE NOTICED. OR THE OPPOSITE, BEING SO FIGETY OR RESTLESS THAT YOU HAVE BEEN MOVING AROUND A LOT MORE THAN USUAL: 0
6. FEELING BAD ABOUT YOURSELF - OR THAT YOU ARE A FAILURE OR HAVE LET YOURSELF OR YOUR FAMILY DOWN: 0
SUM OF ALL RESPONSES TO PHQ QUESTIONS 1-9: 0
3. TROUBLE FALLING OR STAYING ASLEEP: 0
4. FEELING TIRED OR HAVING LITTLE ENERGY: 0

## 2022-12-22 NOTE — PROGRESS NOTES
medication. She follows with GI, orthopedics and now cardiology soon neurology     Review of Systems    Constitutional: Negative for activity change, appetite change, chills, diaphoresis, , fever and unexpected weight change. HENT: Negative  Respiratory: Negative for cough,  and wheezing. Positive for dyspnea on exertion-improved-  Cardiovascular: Negative for palpitations and leg swelling. She did have recent positive stress test and recent heart catheterization ---PAF on anticoagulation  gastrointestinal: Negative for blood in stool, constipation, diarrhea, nausea and vomiting. Does have history of ulcerative colitis. -Quiescent at this point. Has had intermittent abdominal discomfort Abdominal discomfort resolved  Endocrine: Negative. Genitourinary:  Negative for difficulty urinating, dysuria, frequency and hematuria. No longer seeing urology  Musculoskeletal: Positive for arthralgias   Negative for myalgias. Skin: Hair loss and couple roughened skin lesions allergic/Immunologic: Negative for environmental allergies and immunocompromised state. Neurological: Negative for, weakness, light-headedness, numbness and headaches. Family has complained of some confusion and difficulty getting words out. Now also tremor and shuffling gait. She has been set up with neurology. Hematological: Negative. psychiatric/Behavioral:        Depression: Has had some mild recurrence off of her Lexapro but now back on and doing better            REST OF PERTINENT ROS GONE OVER AND WAS NEGATIVE.      PMH:  Health Maintenance:  Mammogram - (7/9/2018)  Mammogram Screening - (7/9/2018)  Influenza Vaccination - (9/20/2018)  Couseled on Home Safety - (2/26/2018)  Colonoscopy - (1/16/2018), 8/21-due 26  Colonoscopy Screening - (1/16/2018)  Bone Density Scan - (9/29/2016)  Pneumonia Vaccination - (9/22/2016)  Tetanus Immunization - (9/22/2016)  pt cannot recall if she had this vaccination, no record from pharmacy  Stress Test - ,9/15, -negative, -positive  heart cath - -ok, -mild to moderate nonobstructive CAD  Colonoscopy - ,,-  EKG - 3/14,   has Gyn - Dr. Gerardo Martins  2D ECHO - 9/15  Hemmocult Cards - 10/16-neg x 3  EGD - , 10/19,-, -  Prevnar Vaccine - (11/3/2015) [facility]  MMSE 10/22-  Medical Problems:  Hypertension, Restless Leg Syndrome, irritable bladder, Depression  Ulcerative Colitis - follows with dr Galilea Yang  Anemia - follows with dr Leilani aTm Polyps, over active bladder, vit D defic, chronic sinus congestion, Osteoarthritis, Diverticulosis, fx  right wrist, Pfo, Hyperlipidemia, Diastolic Dysfunction, Cholelithiasis, fracture right lateral malleolus,  Valvular Heart Disease, Lumbar DDD, Lumbar Spinal Stenosis, Lumbar herniated disc  Paroxysmal atrial fibrillation--anticoagulated with warfarin  CAD (mild to moderate nonobstructive) per heart cath-  Elevated LFTs-  Surgical Hx:  DEON - Non-cancerous  Left breast mass removal - x2-benign  Right carpal tunnel surgery, Rectocele repair, Left knee arthroscopy, Bilateral total hip arthroplasty,  Bladder suspension, Bunion surgery, Bilateral cataract surgery, Repair of torn meniscus left knee,  Posterior-lateral fusion L2-5    Right total knee arthroplasty-  Left total knee arthroplasty-   old records reviewed  FH:  Father:  Heart Disease -  age 80. Mother:  Non Insulin Dependent Diabetes -  age84  Congestive Heart Failure (CHF). Brother 1:  Obesity, Peripheral Vascular Disease (PVD). Maternal Grandmother:  Breast Cancer. SH:  Marital: Legal Status: . retired   Personal Habits: Cigarette Use: Does Not Smoke. Alcohol: Denies alcohol use                Current Outpatient Medications:     escitalopram (LEXAPRO) 10 MG tablet, Take 1 tablet by mouth daily, Disp: 30 tablet, Rfl: 3    torsemide (DEMADEX) 20 MG tablet, Take 0.5 tablets by mouth daily, Disp: 90 tablet, Rfl: 1    warfarin (COUMADIN) 2 MG tablet, 4mg daily or as directed, Disp: 180 tablet, Rfl: 0    lisinopril (PRINIVIL;ZESTRIL) 30 MG tablet, Take 1 tablet by mouth daily, Disp: 90 tablet, Rfl: 1    atorvastatin (LIPITOR) 40 MG tablet, Take 1 tablet by mouth daily, Disp: 90 tablet, Rfl: 1    azelastine (ASTELIN) 0.1 % nasal spray, 1 spray by Nasal route 2 times daily Use in each nostril as directed, Disp: 60 mL, Rfl: 1    warfarin (COUMADIN) 5 MG tablet, Take 1 tablet by mouth daily (Patient taking differently: Take 6 mg by mouth Five times weekly Monday thru Fiday), Disp: 90 tablet, Rfl: 1    metoprolol succinate (TOPROL XL) 50 MG extended release tablet, Take 1 tablet by mouth daily, Disp: 90 tablet, Rfl: 3    nitroGLYCERIN (NITROSTAT) 0.4 MG SL tablet, Place 1 tablet under the tongue every 5 minutes as needed for Chest pain up to max of 3 total doses.  If no relief after 1 dose, call 911., Disp: 25 tablet, Rfl: 0    omeprazole (PRILOSEC) 20 MG delayed release capsule, Take 20 mg by mouth daily , Disp: , Rfl:   Allergies   Allergen Reactions    Erythromycin Hives    Penicillin G     Azithromycin     Penicillins Hives    Propoxyphene        Past Medical History:   Diagnosis Date    Anemia     Cholelithiasis     Chronic congestion of paranasal sinus     Colon polyps     Depression     Diastolic dysfunction     Diverticulosis     History of blood transfusion     HTN (hypertension)     Hyperlipidemia 06/20/2019    Irritable bladder     Lumbar degenerative disc disease     Lumbar herniated disc     Lumbar spinal stenosis     Major depressive disorder with single episode, in remission ([facility] [address] 06/20/2019    Osteoarthritis     Overactive bladder     RLS (restless legs syndrome) 06/20/2019    Ulcerative colitis ([facility] [address]     Ulcerative colitis without complications ([facility] [address] 46/44/1699    Valvular heart disease     Vitamin D deficiency     Wrist fracture     Right     Past Surgical History:   Procedure Laterality Date BLADDER SUSPENSION      BREAST LUMPECTOMY Left     x 2, negative    BUNIONECTOMY      CARDIAC CATHETERIZATION  2012    CARPAL TUNNEL RELEASE Right     CATARACT REMOVAL WITH IMPLANT Right     COLONOSCOPY  2014    HYSTERECTOMY, TOTAL ABDOMINAL (CERVIX REMOVED)      non-cancerous    JOINT REPLACEMENT Bilateral     hip    KNEE ARTHROSCOPY Left     RECTOCELE REPAIR       Family History   Problem Relation Age of Onset    Heart Disease Father          age 80    Stroke Father     Diabetes Mother          age 80    Heart Disease Mother         CHF    Other Brother         PVD, obesity    Heart Disease Brother         CHF    Cancer Paternal Grandmother         breast     Social History     Socioeconomic History    Marital status:      Spouse name: Not on file    Number of children: Not on file    Years of education: Not on file    Highest education level: Not on file   Occupational History    Not on file   Tobacco Use    Smoking status: Never    Smokeless tobacco: Never   Vaping Use    Vaping Use: Never used   Substance and Sexual Activity    Alcohol use: Yes     Comment: very occasionally    Drug use: No    Sexual activity: Not on file   Other Topics Concern    Not on file   Social History Narrative    Not on file     Social Determinants of Health     Financial Resource Strain: Not on file   Food Insecurity: Not on file   Transportation Needs: Not on file   Physical Activity: Insufficiently Active    Days of Exercise per Week: 3 days    Minutes of Exercise per Session: 30 min   Stress: Not on file   Social Connections: Not on file   Intimate Partner Violence: Not on file   Housing Stability: Not on file       Vitals:    22 1459 22 1554   BP: 132/62    Pulse: (!) 49 56   Temp: 97.2 °F (36.2 °C)    TempSrc: Temporal    SpO2: 97%    Weight: 156 lb (70.8 kg)    Height: 5' (1.524 m)        Physical Exam  Constitutional: She is oriented to person, place, and time.  She appears well-developed and well-nourished. No distress. HEENT: Throat not injected. Clear mucus draining posterior pharynx. Neck: Normal range of motion. Neck supple. No JVD present. No thyromegaly present. Cardiovascular: Normal rate, regular rhythm, normal heart sounds and intact distal pulses. Exam reveals no gallop and no friction rub. No murmur heard. She did have pedal edema right greater than left. Negative Homans' sign. No calf or thigh tenderness to palpation. Pulmonary/Chest: Effort normal and breath sounds normal. No respiratory distress. She has no wheezes. She has no rales. Abdominal: Soft. Bowel sounds are normal. She exhibits no distension and no mass. No pain to palpation. Musculoskeletal: Normal range of motion. She exhibits no edema. Neurological: She is drowsy appearing and oriented to person, place, and time. She displays normal reflexes. No sensory deficit. She exhibits normal muscle tone. Coordination normal.  I did watch her walk down the [place] and she did have a shuffling gait and limited arm movement. She does appear to have somewhat of a masked facies and intermittent resting tremor. Skin: Skin is warm and dry. No rash noted. No erythema. She did have very minimal noted hair thinning I could not really pull anything out. She also had a couple skin lesions that were roughened. 1 of these were to the left scalp and another to the right upper back. She had a small lipomatous lesion to the upper back as well as a skin tag just below the bra line.-She was sent to dermatology  Psychiatric: She has a flat affect. Somewhat slow to respond to questions. Nursing note and vitals reviewed                Assessment and Plan:  Heather Rudolph was seen today for office visit for anticoagulation management.     Diagnoses and all orders for this visit:    Anticoagulated  -     POCT INR    PAF (paroxysmal atrial fibrillation) (HCC)    Elevated LFTs    Essential hypertension  -     Comprehensive Metabolic Panel; Future  -     Gamma GT; Future    Major depressive disorder with single episode, in remission ([facility] [address]    Hyperlipidemia, unspecified hyperlipidemia type    Tremor    Stage 3a chronic kidney disease ([facility] [address]    At high risk for falls    Confusion    Plan: She will see new physician next month Dopplers and follow along with checking PT/INR. Same dose Coumadin. Check blood work today to monitor disease progression and medication use. Fall precautions. Follow-up with above consultants. See above body report/HPI. I asked her to elevate legs and notify if swelling has not gone down in the next several days. Decrease salt intake. Monitor blood pressure closely. Notify us with problems in the interim. Return for keep appt. Seen By:  Priyanka Prince MD      *Document was created using voice recognition software. Note was reviewed however may contain grammatical errors.

## 2023-01-10 ENCOUNTER — INITIAL CONSULT (OUTPATIENT)
Dept: GASTROENTEROLOGY | Age: 78
End: 2023-01-10
Payer: MEDICARE

## 2023-01-10 VITALS
OXYGEN SATURATION: 98 % | RESPIRATION RATE: 18 BRPM | WEIGHT: 153 LBS | BODY MASS INDEX: 26.12 KG/M2 | TEMPERATURE: 98 F | HEIGHT: 64 IN | SYSTOLIC BLOOD PRESSURE: 114 MMHG | HEART RATE: 64 BPM | DIASTOLIC BLOOD PRESSURE: 62 MMHG

## 2023-01-10 DIAGNOSIS — R74.8 ELEVATED LIVER ENZYMES: Primary | ICD-10-CM

## 2023-01-10 DIAGNOSIS — R74.8 ELEVATED LIVER ENZYMES: ICD-10-CM

## 2023-01-10 LAB
ALBUMIN SERPL-MCNC: 4.3 G/DL (ref 3.5–5.2)
ALP BLD-CCNC: 186 U/L (ref 35–104)
ALT SERPL-CCNC: 26 U/L (ref 0–32)
AST SERPL-CCNC: 33 U/L (ref 0–31)
BASOPHILS ABSOLUTE: 0.02 E9/L (ref 0–0.2)
BASOPHILS RELATIVE PERCENT: 0.4 % (ref 0–2)
BILIRUB SERPL-MCNC: 0.4 MG/DL (ref 0–1.2)
BILIRUBIN DIRECT: <0.2 MG/DL (ref 0–0.3)
BILIRUBIN, INDIRECT: ABNORMAL MG/DL (ref 0–1)
EOSINOPHILS ABSOLUTE: 0.06 E9/L (ref 0.05–0.5)
EOSINOPHILS RELATIVE PERCENT: 1.2 % (ref 0–6)
GAMMA GLUTAMYL TRANSFERASE: 123 U/L (ref 6–42)
HCT VFR BLD CALC: 35 % (ref 34–48)
HEMOGLOBIN: 11.5 G/DL (ref 11.5–15.5)
IMMATURE GRANULOCYTES #: 0.02 E9/L
IMMATURE GRANULOCYTES %: 0.4 % (ref 0–5)
INR BLD: 1.9
IRON SATURATION: 23 % (ref 15–50)
IRON: 70 MCG/DL (ref 37–145)
LYMPHOCYTES ABSOLUTE: 1.99 E9/L (ref 1.5–4)
LYMPHOCYTES RELATIVE PERCENT: 40.5 % (ref 20–42)
MCH RBC QN AUTO: 29.3 PG (ref 26–35)
MCHC RBC AUTO-ENTMCNC: 32.9 % (ref 32–34.5)
MCV RBC AUTO: 89.1 FL (ref 80–99.9)
MONOCYTES ABSOLUTE: 0.44 E9/L (ref 0.1–0.95)
MONOCYTES RELATIVE PERCENT: 9 % (ref 2–12)
NEUTROPHILS ABSOLUTE: 2.38 E9/L (ref 1.8–7.3)
NEUTROPHILS RELATIVE PERCENT: 48.5 % (ref 43–80)
PDW BLD-RTO: 13.9 FL (ref 11.5–15)
PLATELET # BLD: 157 E9/L (ref 130–450)
PMV BLD AUTO: 12 FL (ref 7–12)
PROTHROMBIN TIME: 20.5 SEC (ref 9.3–12.4)
RBC # BLD: 3.93 E12/L (ref 3.5–5.5)
TOTAL IRON BINDING CAPACITY: 299 MCG/DL (ref 250–450)
TOTAL PROTEIN: 7.2 G/DL (ref 6.4–8.3)
WBC # BLD: 4.9 E9/L (ref 4.5–11.5)

## 2023-01-10 PROCEDURE — 1090F PRES/ABSN URINE INCON ASSESS: CPT | Performed by: NURSE PRACTITIONER

## 2023-01-10 PROCEDURE — G8484 FLU IMMUNIZE NO ADMIN: HCPCS | Performed by: NURSE PRACTITIONER

## 2023-01-10 PROCEDURE — G8400 PT W/DXA NO RESULTS DOC: HCPCS | Performed by: NURSE PRACTITIONER

## 2023-01-10 PROCEDURE — 3078F DIAST BP <80 MM HG: CPT | Performed by: NURSE PRACTITIONER

## 2023-01-10 PROCEDURE — 99202 OFFICE O/P NEW SF 15 MIN: CPT | Performed by: NURSE PRACTITIONER

## 2023-01-10 PROCEDURE — G8417 CALC BMI ABV UP PARAM F/U: HCPCS | Performed by: NURSE PRACTITIONER

## 2023-01-10 PROCEDURE — G8427 DOCREV CUR MEDS BY ELIG CLIN: HCPCS | Performed by: NURSE PRACTITIONER

## 2023-01-10 PROCEDURE — 3074F SYST BP LT 130 MM HG: CPT | Performed by: NURSE PRACTITIONER

## 2023-01-10 PROCEDURE — 1036F TOBACCO NON-USER: CPT | Performed by: NURSE PRACTITIONER

## 2023-01-10 PROCEDURE — 1123F ACP DISCUSS/DSCN MKR DOCD: CPT | Performed by: NURSE PRACTITIONER

## 2023-01-10 NOTE — PROGRESS NOTES
Stephani Quiles (:  1945) is a 65 y.o. female, here for evaluation of the following chief complaint(s):  New Patient (New patient ref by Dr. Kita Lyons for elevated LFTs)      SUBJECTIVE/OBJECTIVE:  HPI:    Fouzia Stallings is a very pleasant 65year old female that presents today for elevated liver enzymes. Labs from 22 show an alkaline phosphatase of 220, AST of 47, and ALT of 49. Elevation in liver enzymes started in 2023. MRI without contrast showed   IMPRESSION:      1. Cholelithiasis (without pericholecystic inflammation). No choledocholithiasis nor dilatation      of the biliary tree. 2.  No hepatosplenomegaly nor fatty infiltration of the liver. Ultrasound of the liver was unremarkable    Patient was started on warfarin in the fall for afib  On atorvastatin for elevated cholesterol    No routine NSAID or tylenol use  No alcohol use  No knowledge of a blood transfusion    Mentions having yellowing of her hands but denies abdominal pain. Admits to brain fog             ROS:  General: Patient denies n/v/f/c or weight loss. HEENT: Patient denies persistent postnasal drip, scleral icterus, drooling, persistent bleeding from nose/mouth. Resp: Patient denies SOB, wheezing, productive cough. Cards: Patient denies CP, palpitations, significant edema  GI: As above. Derm: Patient denies jaundice/rashes. Musc: Patient denies diffuse/irregular joint swelling or myalgias.       Objective   Wt Readings from Last 3 Encounters:   01/10/23 153 lb (69.4 kg)   22 156 lb (70.8 kg)   22 151 lb 3.2 oz (68.6 kg)     Temp Readings from Last 3 Encounters:   01/10/23 98 °F (36.7 °C) (Infrared)   22 97.2 °F (36.2 °C) (Temporal)   22 98.2 °F (36.8 °C) (Temporal)     BP Readings from Last 3 Encounters:   01/10/23 114/62   22 132/62   10/26/22 126/70     Pulse Readings from Last 3 Encounters:   01/10/23 64   22 56   22 80        Physical Exam  Constitutional:       Appearance: Normal appearance. Abdominal:      General: Bowel sounds are normal.      Palpations: Abdomen is soft. Neurological:      Mental Status: She is alert.        Past Medical History:   Diagnosis Date    Anemia     Cholelithiasis     Chronic congestion of paranasal sinus     Colon polyps     Depression     Diastolic dysfunction     Diverticulosis     History of blood transfusion     HTN (hypertension)     Hyperlipidemia 2019    Irritable bladder     Lumbar degenerative disc disease     Lumbar herniated disc     Lumbar spinal stenosis     Major depressive disorder with single episode, in remission ([facility] [address] 2019    Osteoarthritis     Overactive bladder     RLS (restless legs syndrome) 2019    Valvular heart disease     Vitamin D deficiency     Wrist fracture     Right      Past Surgical History:   Procedure Laterality Date    BLADDER SUSPENSION  1978    BREAST LUMPECTOMY Left     x 2, negative    BUNIONECTOMY  1990    CARDIAC CATHETERIZATION  2012    CARPAL TUNNEL RELEASE Right     CATARACT REMOVAL WITH IMPLANT Right     COLONOSCOPY  2014    HYSTERECTOMY, TOTAL ABDOMINAL (CERVIX REMOVED)      non-cancerous    JOINT REPLACEMENT Bilateral     hip    JOINT REPLACEMENT Bilateral     KNEE ARTHROSCOPY Left     RECTOCELE REPAIR        Family History   Problem Relation Age of Onset    Diabetes Mother          age 80    Heart Disease Mother         CHF    Heart Disease Father          age 80    Stroke Father     Other Brother         PVD, obesity    Heart Disease Brother         CHF    Cancer Paternal Grandmother         breast    Other Child         autoimmune uticaria        Lab Results   Component Value Date    WBC 4.0 (L) 10/12/2022    HGB 11.5 10/12/2022    HCT 36.1 10/12/2022    MCV 95.5 10/12/2022     10/12/2022      Lab Results   Component Value Date     2022    K 3.8 2022    CL 98 2022    CO2 27 11/30/2022    BUN 20 11/30/2022    CREATININE 1.1 (H) 11/30/2022    GLUCOSE 105 (H) 11/30/2022    CALCIUM 9.8 11/30/2022    PROT 7.3 11/30/2022    LABALBU 4.5 11/30/2022    BILITOT 0.4 11/30/2022    ALKPHOS 220 (H) 11/30/2022    AST 47 (H) 11/30/2022    ALT 49 (H) 11/30/2022    LABGLOM 51 11/30/2022    GFRAA 58 10/12/2022    AGRATIO 1.6 03/08/2022    GLOB 2.6 03/08/2022                       ASSESSMENT/PLAN:    1. Elevated liver enzymes  -     CBC with Auto Differential; Future  -     Hepatic Function Panel; Future  -     Protime-INR; Future  -     Hepatitis B Surface Antigen; Future  -     Hepatitis C Antibody; Future  -     Hepatitis B Surface Antibody; Future  -     Hepatitis B Core Antibody, Total; Future  -     Iron and TIBC; Future  -     Hepatitis A Antibody, Total; Future  -     Ceruloplasmin; Future  -     Gamma GT; Future  -     Antimitochondrial antibody; Future    -further management pending study results    Return for Follow up to be determined . An electronic signature was used to authenticate this note.     --FAIZA Quintero - CNP

## 2023-01-11 LAB
ANTI-MITOCHONDRIAL AB, IFA: NEGATIVE
HBV SURFACE AB TITR SER: NORMAL {TITER}
HEPATITIS B SURFACE ANTIGEN INTERPRETATION: NORMAL
HEPATITIS C ANTIBODY INTERPRETATION: NORMAL
SMOOTH MUSCLE ANTIBODY: NEGATIVE

## 2023-01-12 LAB
CERULOPLASMIN: 27 MG/DL (ref 16–45)
HAV AB SERPL IA-ACNC: NEGATIVE
HEPATITIS B CORE TOTAL ANTIBODY: NONREACTIVE

## 2023-01-13 DIAGNOSIS — R74.8 ELEVATED LIVER ENZYMES: Primary | ICD-10-CM

## 2023-01-16 ENCOUNTER — TELEPHONE (OUTPATIENT)
Dept: PRIMARY CARE CLINIC | Age: 78
End: 2023-01-16

## 2023-01-16 NOTE — TELEPHONE ENCOUNTER
Patients daughter Donnie Beavers called in this morning. Patient fell over the weekend and was taken by ambulance to [facility], they did send her back home after running some tests and changing meds. Patients daughter is very concerned because she is having trouble ambulating her, and feels there is more going on. New patient appointment for this patient is scheduled on Jan 24th. She was wondering if there is anyway we can get her in sooner? I did look at both [address] schedule for this week, and there is currently nothing. I told her I will pass this message on, and try to watch. Please advise of another date and time if you see it sooner. Thanks SO MUCH!

## 2023-01-16 NOTE — TELEPHONE ENCOUNTER
I spk with patients daughter Samia Spence, and she said they will take the 8am spot for tommorow 1/17 at [place]. I also req Medical Records from [facility]. Thank you.

## 2023-01-17 ENCOUNTER — OFFICE VISIT (OUTPATIENT)
Dept: PRIMARY CARE CLINIC | Age: 78
End: 2023-01-17
Payer: MEDICARE

## 2023-01-17 ENCOUNTER — TELEPHONE (OUTPATIENT)
Dept: PRIMARY CARE CLINIC | Age: 78
End: 2023-01-17

## 2023-01-17 VITALS
RESPIRATION RATE: 18 BRPM | HEART RATE: 70 BPM | SYSTOLIC BLOOD PRESSURE: 96 MMHG | BODY MASS INDEX: 25.95 KG/M2 | OXYGEN SATURATION: 100 % | DIASTOLIC BLOOD PRESSURE: 62 MMHG | HEIGHT: 64 IN | WEIGHT: 152 LBS | TEMPERATURE: 97.6 F

## 2023-01-17 DIAGNOSIS — N18.31 STAGE 3A CHRONIC KIDNEY DISEASE (HCC): ICD-10-CM

## 2023-01-17 DIAGNOSIS — Z23 NEED FOR IMMUNIZATION AGAINST INFLUENZA: ICD-10-CM

## 2023-01-17 DIAGNOSIS — I10 ESSENTIAL (PRIMARY) HYPERTENSION: ICD-10-CM

## 2023-01-17 DIAGNOSIS — R41.3 MEMORY DIFFICULTY: ICD-10-CM

## 2023-01-17 DIAGNOSIS — Z51.81 ANTICOAGULATION GOAL OF INR 2 TO 3: ICD-10-CM

## 2023-01-17 DIAGNOSIS — R25.1 TREMOR OF RIGHT HAND: ICD-10-CM

## 2023-01-17 DIAGNOSIS — Z79.01 ANTICOAGULATION GOAL OF INR 2 TO 3: ICD-10-CM

## 2023-01-17 DIAGNOSIS — I95.1 ORTHOSTATIC HYPOTENSION: ICD-10-CM

## 2023-01-17 DIAGNOSIS — Z76.89 ENCOUNTER TO ESTABLISH CARE WITH NEW DOCTOR: Primary | ICD-10-CM

## 2023-01-17 DIAGNOSIS — R29.6 RECURRENT FALLS: ICD-10-CM

## 2023-01-17 DIAGNOSIS — R44.1 VISUAL HALLUCINATIONS: ICD-10-CM

## 2023-01-17 DIAGNOSIS — I48.0 PAF (PAROXYSMAL ATRIAL FIBRILLATION) (HCC): ICD-10-CM

## 2023-01-17 PROBLEM — I25.118 CORONARY ARTERY DISEASE OF NATIVE ARTERY OF NATIVE HEART WITH STABLE ANGINA PECTORIS (HCC): Status: ACTIVE | Noted: 2023-01-17

## 2023-01-17 PROBLEM — M51.369 DEGENERATION OF LUMBAR INTERVERTEBRAL DISC: Status: ACTIVE | Noted: 2019-01-17

## 2023-01-17 PROBLEM — M51.36 DEGENERATION OF LUMBAR INTERVERTEBRAL DISC: Status: ACTIVE | Noted: 2019-01-17

## 2023-01-17 PROBLEM — M43.16 SPONDYLOLISTHESIS OF LUMBAR REGION: Status: ACTIVE | Noted: 2019-01-17

## 2023-01-17 PROBLEM — K80.20 CALCULUS OF GALLBLADDER WITHOUT CHOLECYSTITIS WITHOUT OBSTRUCTION: Status: ACTIVE | Noted: 2023-01-17

## 2023-01-17 PROCEDURE — 3074F SYST BP LT 130 MM HG: CPT | Performed by: STUDENT IN AN ORGANIZED HEALTH CARE EDUCATION/TRAINING PROGRAM

## 2023-01-17 PROCEDURE — 3078F DIAST BP <80 MM HG: CPT | Performed by: STUDENT IN AN ORGANIZED HEALTH CARE EDUCATION/TRAINING PROGRAM

## 2023-01-17 PROCEDURE — 99214 OFFICE O/P EST MOD 30 MIN: CPT | Performed by: STUDENT IN AN ORGANIZED HEALTH CARE EDUCATION/TRAINING PROGRAM

## 2023-01-17 PROCEDURE — 1090F PRES/ABSN URINE INCON ASSESS: CPT | Performed by: STUDENT IN AN ORGANIZED HEALTH CARE EDUCATION/TRAINING PROGRAM

## 2023-01-17 PROCEDURE — G8427 DOCREV CUR MEDS BY ELIG CLIN: HCPCS | Performed by: STUDENT IN AN ORGANIZED HEALTH CARE EDUCATION/TRAINING PROGRAM

## 2023-01-17 PROCEDURE — G8400 PT W/DXA NO RESULTS DOC: HCPCS | Performed by: STUDENT IN AN ORGANIZED HEALTH CARE EDUCATION/TRAINING PROGRAM

## 2023-01-17 PROCEDURE — 1123F ACP DISCUSS/DSCN MKR DOCD: CPT | Performed by: STUDENT IN AN ORGANIZED HEALTH CARE EDUCATION/TRAINING PROGRAM

## 2023-01-17 PROCEDURE — G0008 ADMIN INFLUENZA VIRUS VAC: HCPCS | Performed by: STUDENT IN AN ORGANIZED HEALTH CARE EDUCATION/TRAINING PROGRAM

## 2023-01-17 PROCEDURE — A6537 GC STOCKING FULL LNGTH 30-40: HCPCS | Performed by: STUDENT IN AN ORGANIZED HEALTH CARE EDUCATION/TRAINING PROGRAM

## 2023-01-17 PROCEDURE — G8417 CALC BMI ABV UP PARAM F/U: HCPCS | Performed by: STUDENT IN AN ORGANIZED HEALTH CARE EDUCATION/TRAINING PROGRAM

## 2023-01-17 PROCEDURE — 1036F TOBACCO NON-USER: CPT | Performed by: STUDENT IN AN ORGANIZED HEALTH CARE EDUCATION/TRAINING PROGRAM

## 2023-01-17 PROCEDURE — G8484 FLU IMMUNIZE NO ADMIN: HCPCS | Performed by: STUDENT IN AN ORGANIZED HEALTH CARE EDUCATION/TRAINING PROGRAM

## 2023-01-17 PROCEDURE — 90694 VACC AIIV4 NO PRSRV 0.5ML IM: CPT | Performed by: STUDENT IN AN ORGANIZED HEALTH CARE EDUCATION/TRAINING PROGRAM

## 2023-01-17 RX ORDER — WARFARIN SODIUM 1 MG/1
1 TABLET ORAL DAILY
COMMUNITY
End: 2023-01-23 | Stop reason: SDUPTHER

## 2023-01-17 RX ORDER — LISINOPRIL 20 MG/1
20 TABLET ORAL DAILY
Qty: 90 TABLET | Refills: 0 | Status: SHIPPED | OUTPATIENT
Start: 2023-01-17

## 2023-01-17 SDOH — ECONOMIC STABILITY: FOOD INSECURITY: WITHIN THE PAST 12 MONTHS, THE FOOD YOU BOUGHT JUST DIDN'T LAST AND YOU DIDN'T HAVE MONEY TO GET MORE.: NEVER TRUE

## 2023-01-17 SDOH — ECONOMIC STABILITY: FOOD INSECURITY: WITHIN THE PAST 12 MONTHS, YOU WORRIED THAT YOUR FOOD WOULD RUN OUT BEFORE YOU GOT MONEY TO BUY MORE.: NEVER TRUE

## 2023-01-17 ASSESSMENT — PATIENT HEALTH QUESTIONNAIRE - PHQ9
7. TROUBLE CONCENTRATING ON THINGS, SUCH AS READING THE NEWSPAPER OR WATCHING TELEVISION: 0
1. LITTLE INTEREST OR PLEASURE IN DOING THINGS: 0
8. MOVING OR SPEAKING SO SLOWLY THAT OTHER PEOPLE COULD HAVE NOTICED. OR THE OPPOSITE, BEING SO FIGETY OR RESTLESS THAT YOU HAVE BEEN MOVING AROUND A LOT MORE THAN USUAL: 0
4. FEELING TIRED OR HAVING LITTLE ENERGY: 3
SUM OF ALL RESPONSES TO PHQ QUESTIONS 1-9: 9
10. IF YOU CHECKED OFF ANY PROBLEMS, HOW DIFFICULT HAVE THESE PROBLEMS MADE IT FOR YOU TO DO YOUR WORK, TAKE CARE OF THINGS AT HOME, OR GET ALONG WITH OTHER PEOPLE: 0
5. POOR APPETITE OR OVEREATING: 3
2. FEELING DOWN, DEPRESSED OR HOPELESS: 0
SUM OF ALL RESPONSES TO PHQ QUESTIONS 1-9: 9
SUM OF ALL RESPONSES TO PHQ QUESTIONS 1-9: 9
9. THOUGHTS THAT YOU WOULD BE BETTER OFF DEAD, OR OF HURTING YOURSELF: 0
3. TROUBLE FALLING OR STAYING ASLEEP: 3
6. FEELING BAD ABOUT YOURSELF - OR THAT YOU ARE A FAILURE OR HAVE LET YOURSELF OR YOUR FAMILY DOWN: 0
SUM OF ALL RESPONSES TO PHQ9 QUESTIONS 1 & 2: 0
SUM OF ALL RESPONSES TO PHQ QUESTIONS 1-9: 9

## 2023-01-17 ASSESSMENT — ENCOUNTER SYMPTOMS
BLOOD IN STOOL: 0
SHORTNESS OF BREATH: 1
CONSTIPATION: 1
COUGH: 0
DIARRHEA: 0

## 2023-01-17 ASSESSMENT — SOCIAL DETERMINANTS OF HEALTH (SDOH): HOW HARD IS IT FOR YOU TO PAY FOR THE VERY BASICS LIKE FOOD, HOUSING, MEDICAL CARE, AND HEATING?: NOT HARD AT ALL

## 2023-01-17 NOTE — PROGRESS NOTES
NEW PRIMARY CARE VISIT    23  Name: [place] Cuna   : 1945   Age: 65 y.o. Sex: female        Assessment & Plan:       ICD-10-CM    1. Encounter to establish care with new doctor  Z76.89       2. Recurrent falls  R29.6 Compression Stocking     Amb External Referral To Home Health      3. Memory difficulty  R41.3       4. Visual hallucinations  R44.1       5. Tremor of right hand  R25.1       6. Orthostatic hypotension  I95.1       7. Essential (primary) hypertension  I10 lisinopril (PRINIVIL;ZESTRIL) 20 MG tablet      8. Stage 3a chronic kidney disease (HCC)  N18.31       9. PAF (paroxysmal atrial fibrillation) ([facility])  I48.0       10. Anticoagulation goal of INR 2 to 3  Z51.81     Z79.01       11. Need for immunization against influenza  Z23 Influenza, FLUAD, (age 72 y+), IM, PF, 0.5 mL        New patient history reviewed and updated. ED records reviewed. Patient scheduled with neurology for several new concerning neurologic symptoms as well as recurrent falls. Given possible orthostasis, recommended reduction in lisinopril 20 mg daily, compression stockings, and elevated head of bed. Encouraged use of walker which patient has at home but does not use. Referred to home health for nurse and PT/OT but also encouraged daughter to look into long-term aid through other programs. INR was above goal last week in ED. Patient will return for 1 week recheck and warfarin adjustment if needed. Currently takes this for history of atrial fibrillation. Counseled patient and daughter regarding above diagnosis, including possible risks and complications, especially if left uncontrolled. Counseled patient and daughter as appropriate and relevant regarding any possible side effects, risks, and alternatives to treatment; the patient and daughter verbalize understanding, and are in agreement with the plan as detailed above.    All educational materials and instructions were discussed and included on the After Visit Summary. All questions answered to the patient and daughter's satisfaction. The patient and daughter were advised to call or send [employer]Medic message for any concerns prior to next appointment. Return in about 2 months (around 3/17/2023). 39 minutes was spent precharting, face-to-face with patient, and documenting on day of encounter. Subjective:     Chief Complaint   Patient presents with    Other     New pt to provider  was seen at [facility] ER on Sunday due to fall and as result had difficulty walking and she is on Warfarin  no bleeding noted from fall     Patient needs new PCP. Presents with daughter Daisha Romero who she lives with. Daughter is her main caretaker and works full time. Daughter's  also has to leave during daytime to take care of his mother. Patient is poor historian. Daughter reports patient fell backward, back and buttock hit sliding door and trash can. Did not hit head. Daughter could not get her to walk afterward so had her take to ED via EMS. Had INR done in ED, was slightly elevated at 3.28. CT head and lumbar spine without acute abnormalities. Daughter reports patient has been falling a lot, last fall before this in October. Right arm and leg shaking recently for past year. Has trouble finishing sentences, forgets what she was talking about. Sometimes daughter worries that she sees things, or that she dreams things and then thinks they are there. Has not tried to leave the home by herself. Scheduled with neuro Dr. Edilia Clemente 2/2 for new patient appointment. Only stairs are 4 into the home. Has safety bars in bathroom and shower stool. Does not drive. Also had elevated liver enzymes within past 3 months including ALP, ALT, AST, GGT. Saw GI, further testing did not find anything and liver enzymes recently down-trending. GI to continue checking over next month. Review of Systems   Constitutional:  Negative for diaphoresis and fatigue. Eyes:  Negative for visual disturbance.   Respiratory:  Positive for shortness of breath (with exertion). Negative for cough.    Cardiovascular:  Positive for leg swelling. Negative for chest pain and palpitations.   Gastrointestinal:  Positive for constipation. Negative for blood in stool and diarrhea.   Endocrine: Negative for polydipsia and polyuria.   Musculoskeletal:  Positive for neck pain.   Neurological:  Positive for tremors (right arm and leg), weakness (generalized) and light-headedness. Negative for numbness and headaches.   Psychiatric/Behavioral:  Positive for confusion and hallucinations.      Medical History:   History reviewed and updated as needed.    Patient Active Problem List   Diagnosis    HTN (hypertension)    Chest pain    Ulcerative colitis without complications (formerly [facility])    Hyperlipidemia    Major depressive disorder with single episode, in remission (formerly [facility])    RLS (restless legs syndrome)    Anemia    VHD (valvular heart disease)    Lightheadedness    Abnormal nuclear stress test    Chronic renal disease, stage III (formerly [facility]) [123142]    PAF (paroxysmal atrial fibrillation) (formerly [facility])    Coronary artery disease of native artery of native heart with stable angina pectoris (formerly [facility])      Past Medical History:   Diagnosis Date    Anemia     Cholelithiasis     Chronic congestion of paranasal sinus     Colon polyps     Coronary artery disease of native artery of native heart with stable angina pectoris (formerly [facility]) 1/17/2023    Depression     Diastolic dysfunction     Diverticulosis     History of blood transfusion     HTN (hypertension)     Hyperlipidemia 06/20/2019    Irritable bladder     Lumbar degenerative disc disease     Lumbar herniated disc     Lumbar spinal stenosis     Major depressive disorder with single episode, in remission (formerly [facility]) 06/20/2019    Osteoarthritis     Overactive bladder     RLS (restless legs syndrome) 06/20/2019    Valvular heart disease     Vitamin D deficiency     Wrist fracture     Right     Past Surgical History:   Procedure  Laterality Date    BLADDER SUSPENSION  1978    BREAST LUMPECTOMY Left     x 2, negative    BUNIONECTOMY  1990    CARDIAC CATHETERIZATION  2012    CARPAL TUNNEL RELEASE Right     CATARACT REMOVAL WITH IMPLANT Right     COLONOSCOPY  2014    HYSTERECTOMY, TOTAL ABDOMINAL (CERVIX REMOVED)      non-cancerous    JOINT REPLACEMENT Bilateral     hip    JOINT REPLACEMENT Bilateral     KNEE ARTHROSCOPY Left     RECTOCELE REPAIR       Family History   Problem Relation Age of Onset    Diabetes Mother          age 80    Heart Disease Mother         CHF    Heart Disease Father          age 80    Stroke Father     Other Brother         PVD, obesity    Heart Disease Brother         CHF    Cancer Paternal Grandmother         breast    Other Child         autoimmune uticaria     Medications:     Current Outpatient Medications:     warfarin (COUMADIN) 1 MG tablet, Take 1 mg by mouth daily As directed, Disp: , Rfl:     escitalopram (LEXAPRO) 10 MG tablet, Take 1 tablet by mouth daily, Disp: 30 tablet, Rfl: 3    torsemide (DEMADEX) 20 MG tablet, Take 0.5 tablets by mouth daily, Disp: 90 tablet, Rfl: 1    warfarin (COUMADIN) 2 MG tablet, 4mg daily or as directed, Disp: 180 tablet, Rfl: 0    lisinopril (PRINIVIL;ZESTRIL) 30 MG tablet, Take 1 tablet by mouth daily, Disp: 90 tablet, Rfl: 1    atorvastatin (LIPITOR) 40 MG tablet, Take 1 tablet by mouth daily, Disp: 90 tablet, Rfl: 1    warfarin (COUMADIN) 5 MG tablet, Take 1 tablet by mouth daily (Patient taking differently: Take 6 mg by mouth Five times weekly Monday thru ), Disp: 90 tablet, Rfl: 1    metoprolol succinate (TOPROL XL) 50 MG extended release tablet, Take 1 tablet by mouth daily, Disp: 90 tablet, Rfl: 3    nitroGLYCERIN (NITROSTAT) 0.4 MG SL tablet, Place 1 tablet under the tongue every 5 minutes as needed for Chest pain up to max of 3 total doses.  If no relief after 1 dose, call 911., Disp: 25 tablet, Rfl: 0    omeprazole (PRILOSEC) 20 MG delayed release capsule, Take 20 mg by mouth daily , Disp: , Rfl:     Allergies: Allergies   Allergen Reactions    Erythromycin Hives    Penicillin G     Azithromycin     Penicillins Hives    Propoxyphene        Social History:     Social History     Socioeconomic History    Marital status:      Spouse name: Not on file    Number of children: Not on file    Years of education: Not on file    Highest education level: Not on file   Occupational History    Not on file   Tobacco Use    Smoking status: Never    Smokeless tobacco: Never   Vaping Use    Vaping Use: Never used   Substance and Sexual Activity    Alcohol use: Yes     Comment: very occasionally    Drug use: No    Sexual activity: Not on file   Other Topics Concern    Not on file   Social History Narrative    Not on file     Social Determinants of Health     Financial Resource Strain: Low Risk     Difficulty of Paying Living Expenses: Not hard at all   Food Insecurity: No Food Insecurity    Worried About Running Out of Food in the Last Year: Never true    Ran Out of Food in the Last Year: Never true   Transportation Needs: Not on file   Physical Activity: Insufficiently Active    Days of Exercise per Week: 3 days    Minutes of Exercise per Session: 30 min   Stress: Not on file   Social Connections: Not on file   Intimate Partner Violence: Not on file   Housing Stability: Not on file     Physical Exam:     Vitals:    01/17/23 0819   BP: 96/62   Site: Right Upper Arm   Position: Sitting   Cuff Size: Large Adult   Pulse: 70   Resp: 18   Temp: 97.6 °F (36.4 °C)   SpO2: 100%   Weight: 152 lb (68.9 kg)   Height: 5' 4\" (1.626 m)     BP Readings from Last 3 Encounters:   01/17/23 96/62   01/10/23 114/62   12/21/22 132/62     Wt Readings from Last 3 Encounters:   01/17/23 152 lb (68.9 kg)   01/10/23 153 lb (69.4 kg)   12/21/22 156 lb (70.8 kg)      Physical Exam  Vitals and nursing note reviewed.    Constitutional:       General: She is not in acute distress. Appearance: Normal appearance. She is overweight. She is not ill-appearing or diaphoretic. Cardiovascular:      Rate and Rhythm: Normal rate. Rhythm irregularly irregular. Pulses: Normal pulses. Heart sounds: Normal heart sounds. Pulmonary:      Effort: Pulmonary effort is normal. No respiratory distress. Breath sounds: Normal breath sounds. Musculoskeletal:      Right lower leg: No edema. Left lower leg: No edema. Skin:     General: Skin is warm and dry. Capillary Refill: Capillary refill takes less than 2 seconds. Neurological:      Mental Status: She is alert and oriented to person, place, and time. GCS: GCS eye subscore is 4. GCS verbal subscore is 5. GCS motor subscore is 6. Cranial Nerves: No cranial nerve deficit. Sensory: No sensory deficit. Motor: Tremor (right hand and leg) present. No weakness. Coordination: Coordination normal.      Gait: Gait abnormal (slowed, unsteady). Psychiatric:         Mood and Affect: Mood normal.         Behavior: Behavior normal.     Testing:     Orders Placed This Encounter   Procedures    Influenza, FLUAD, (age 72 y+), IM, PF, 0.5 mL    Amb External Referral To Home Health     Referral Priority:   Routine     Referral Type:   Consult for Advice and Opinion     Referral Reason:   Specialty Services Required     Requested Specialty:   [address]     Number of Visits Requested:   1    Compression Stocking     Bilateral knee high 10-20 mm Hg     Scheduling Instructions:      [facility]      No results found for this or any previous visit (from the past 24 hour(s)).

## 2023-01-17 NOTE — TELEPHONE ENCOUNTER
----- Message from Ev Patrick sent at 1/17/2023 11:36 AM EST -----  Subject: Message to Provider    QUESTIONS  Information for Provider? pt daughter Melissa Rao is calling in because   insurance has told them that they will need a letter from dr Pattie Black   stating that pt needs case management   ---------------------------------------------------------------------------  --------------  [phone] [employer]  [phone]; OK to leave message on voicemail  ---------------------------------------------------------------------------  --------------  SCRIPT ANSWERS  Relationship to Patient?  Self

## 2023-01-17 NOTE — TELEPHONE ENCOUNTER
According to daughter Zoë Clarke she was told we have to mail it to claims office. Mailed letter.  1/17/23

## 2023-01-17 NOTE — LETTER
[facility]  [address]  [place] [address]  Phone: [phone]  Fax: [phone]    Sony Maria MD        January 17, 2023    [place]  [address] [address] [address]      To whom it may concern:    Patient is homebound with multiple chronic medical diseases and multiple emergency department visits for falls over past year. Patient would benefit from a  through her insurance.    If you have any questions or concerns, please don't hesitate to call.    Sincerely,    Sony Maria MD

## 2023-01-24 ENCOUNTER — NURSE ONLY (OUTPATIENT)
Dept: PRIMARY CARE CLINIC | Age: 78
End: 2023-01-24
Payer: MEDICARE

## 2023-01-24 DIAGNOSIS — I48.0 PAF (PAROXYSMAL ATRIAL FIBRILLATION) (HCC): Primary | ICD-10-CM

## 2023-01-24 LAB
INTERNATIONAL NORMALIZATION RATIO, POC: 3.6
PROTHROMBIN TIME, POC: 43.3

## 2023-01-24 PROCEDURE — 85610 PROTHROMBIN TIME: CPT | Performed by: STUDENT IN AN ORGANIZED HEALTH CARE EDUCATION/TRAINING PROGRAM

## 2023-01-25 PROBLEM — R29.6 RECURRENT FALLS: Status: ACTIVE | Noted: 2023-01-25

## 2023-01-25 PROBLEM — R41.3 MEMORY DIFFICULTY: Status: ACTIVE | Noted: 2023-01-25

## 2023-01-25 PROBLEM — R25.1 TREMOR OF RIGHT HAND: Status: ACTIVE | Noted: 2023-01-25

## 2023-01-25 NOTE — PROGRESS NOTES
INR above goal. Would have patient change dose on Wednesdays to 4 mg if has not already taken today. Therefore 4 mg Wednesdays, Saturdays, Sundays and 6 mg all other days. Return for recheck in 1 week.

## 2023-01-31 ENCOUNTER — NURSE ONLY (OUTPATIENT)
Dept: PRIMARY CARE CLINIC | Age: 78
End: 2023-01-31

## 2023-01-31 DIAGNOSIS — I48.0 PAF (PAROXYSMAL ATRIAL FIBRILLATION) (HCC): Primary | ICD-10-CM

## 2023-01-31 LAB
INTERNATIONAL NORMALIZATION RATIO, POC: 2
PROTHROMBIN TIME, POC: 24.5

## 2023-02-06 ENCOUNTER — TELEPHONE (OUTPATIENT)
Dept: PRIMARY CARE CLINIC | Age: 78
End: 2023-02-06

## 2023-02-06 NOTE — TELEPHONE ENCOUNTER
Cris Marrufo from [facility] called just wanting to report some medication interactions that patient is currently taking. Patient is taking Lexapro which interacts with warfarin, omeprazole, metoprolol and lisinopril which interacts with torsemide.

## 2023-02-07 ENCOUNTER — NURSE ONLY (OUTPATIENT)
Dept: PRIMARY CARE CLINIC | Age: 78
End: 2023-02-07

## 2023-02-07 DIAGNOSIS — I48.0 PAF (PAROXYSMAL ATRIAL FIBRILLATION) (HCC): Primary | ICD-10-CM

## 2023-02-07 LAB
INTERNATIONAL NORMALIZATION RATIO, POC: 2.3
PROTHROMBIN TIME, POC: 27.5

## 2023-02-22 ENCOUNTER — NURSE ONLY (OUTPATIENT)
Dept: PRIMARY CARE CLINIC | Age: 78
End: 2023-02-22
Payer: MEDICARE

## 2023-02-22 DIAGNOSIS — I48.0 PAF (PAROXYSMAL ATRIAL FIBRILLATION) (HCC): Primary | ICD-10-CM

## 2023-02-22 LAB
INTERNATIONAL NORMALIZATION RATIO, POC: 2.8
PROTHROMBIN TIME, POC: 33

## 2023-02-22 PROCEDURE — 85610 PROTHROMBIN TIME: CPT | Performed by: STUDENT IN AN ORGANIZED HEALTH CARE EDUCATION/TRAINING PROGRAM

## 2023-02-22 PROCEDURE — 93793 ANTICOAG MGMT PT WARFARIN: CPT | Performed by: STUDENT IN AN ORGANIZED HEALTH CARE EDUCATION/TRAINING PROGRAM

## 2023-02-22 NOTE — PROGRESS NOTES
Plan: INR at goal. Continue warfarin 4 mg Wednesdays, Saturdays, Sundays and 6 mg all other days. Recheck in 2 weeks.

## 2023-03-02 RX ORDER — TORSEMIDE 20 MG/1
10 TABLET ORAL DAILY
Qty: 90 TABLET | Refills: 1 | Status: SHIPPED | OUTPATIENT
Start: 2023-03-02

## 2023-03-04 DIAGNOSIS — I10 ESSENTIAL (PRIMARY) HYPERTENSION: ICD-10-CM

## 2023-03-06 RX ORDER — LISINOPRIL 20 MG/1
20 TABLET ORAL DAILY
Qty: 90 TABLET | Refills: 0 | Status: SHIPPED
Start: 2023-03-06 | End: 2023-03-21 | Stop reason: SDUPTHER

## 2023-03-08 ENCOUNTER — NURSE ONLY (OUTPATIENT)
Dept: PRIMARY CARE CLINIC | Age: 78
End: 2023-03-08

## 2023-03-08 DIAGNOSIS — Z51.81 ANTICOAGULATION GOAL OF INR 2 TO 3: ICD-10-CM

## 2023-03-08 DIAGNOSIS — I48.0 PAF (PAROXYSMAL ATRIAL FIBRILLATION) (HCC): Primary | ICD-10-CM

## 2023-03-08 DIAGNOSIS — Z79.01 ANTICOAGULATION GOAL OF INR 2 TO 3: ICD-10-CM

## 2023-03-08 LAB
INTERNATIONAL NORMALIZATION RATIO, POC: 3.2
PROTHROMBIN TIME, POC: 38.9

## 2023-03-08 NOTE — PROGRESS NOTES
Plan: INR above goal however patient took an additional dose on Thursday by accident. Continue current plan, warfarin 4 mg Wednesdays, Saturdays, Sundays and 6 mg all other days. Recheck in 1 week.

## 2023-03-14 ENCOUNTER — NURSE ONLY (OUTPATIENT)
Dept: PRIMARY CARE CLINIC | Age: 78
End: 2023-03-14
Payer: MEDICARE

## 2023-03-14 DIAGNOSIS — I48.0 PAF (PAROXYSMAL ATRIAL FIBRILLATION) (HCC): ICD-10-CM

## 2023-03-14 DIAGNOSIS — Z51.81 ANTICOAGULATION GOAL OF INR 2 TO 3: Primary | ICD-10-CM

## 2023-03-14 DIAGNOSIS — Z79.01 ANTICOAGULATION GOAL OF INR 2 TO 3: Primary | ICD-10-CM

## 2023-03-14 LAB — INTERNATIONAL NORMALIZATION RATIO, POC: 2.8

## 2023-03-14 PROCEDURE — 85610 PROTHROMBIN TIME: CPT | Performed by: STUDENT IN AN ORGANIZED HEALTH CARE EDUCATION/TRAINING PROGRAM

## 2023-03-14 PROCEDURE — 93793 ANTICOAG MGMT PT WARFARIN: CPT | Performed by: STUDENT IN AN ORGANIZED HEALTH CARE EDUCATION/TRAINING PROGRAM

## 2023-03-14 NOTE — PROGRESS NOTES
Plan: INR at goal.  Patient and daughter were advised by nurse to continue warfarin 4 mg Wednesdays, Saturdays, Sundays; and 6 mg all other days. Recheck in 1 week at appointment.

## 2023-03-17 RX ORDER — ESCITALOPRAM OXALATE 10 MG/1
10 TABLET ORAL DAILY
Qty: 90 TABLET | Refills: 0 | Status: SHIPPED | OUTPATIENT
Start: 2023-03-17

## 2023-03-21 ENCOUNTER — OFFICE VISIT (OUTPATIENT)
Dept: PRIMARY CARE CLINIC | Age: 78
End: 2023-03-21
Payer: MEDICARE

## 2023-03-21 VITALS
BODY MASS INDEX: 26.67 KG/M2 | TEMPERATURE: 97.4 F | HEIGHT: 64 IN | OXYGEN SATURATION: 98 % | SYSTOLIC BLOOD PRESSURE: 128 MMHG | HEART RATE: 76 BPM | WEIGHT: 156.2 LBS | DIASTOLIC BLOOD PRESSURE: 62 MMHG

## 2023-03-21 DIAGNOSIS — Z79.01 ANTICOAGULATION GOAL OF INR 2 TO 3: ICD-10-CM

## 2023-03-21 DIAGNOSIS — I10 ESSENTIAL (PRIMARY) HYPERTENSION: ICD-10-CM

## 2023-03-21 DIAGNOSIS — I48.0 PAF (PAROXYSMAL ATRIAL FIBRILLATION) (HCC): Primary | ICD-10-CM

## 2023-03-21 DIAGNOSIS — J30.89 NON-SEASONAL ALLERGIC RHINITIS, UNSPECIFIED TRIGGER: ICD-10-CM

## 2023-03-21 DIAGNOSIS — Z51.81 ANTICOAGULATION GOAL OF INR 2 TO 3: ICD-10-CM

## 2023-03-21 LAB
INTERNATIONAL NORMALIZATION RATIO, POC: 2.3
PROTHROMBIN TIME, POC: 27.8

## 2023-03-21 PROCEDURE — 3078F DIAST BP <80 MM HG: CPT | Performed by: STUDENT IN AN ORGANIZED HEALTH CARE EDUCATION/TRAINING PROGRAM

## 2023-03-21 PROCEDURE — 1123F ACP DISCUSS/DSCN MKR DOCD: CPT | Performed by: STUDENT IN AN ORGANIZED HEALTH CARE EDUCATION/TRAINING PROGRAM

## 2023-03-21 PROCEDURE — 1090F PRES/ABSN URINE INCON ASSESS: CPT | Performed by: STUDENT IN AN ORGANIZED HEALTH CARE EDUCATION/TRAINING PROGRAM

## 2023-03-21 PROCEDURE — 85610 PROTHROMBIN TIME: CPT | Performed by: STUDENT IN AN ORGANIZED HEALTH CARE EDUCATION/TRAINING PROGRAM

## 2023-03-21 PROCEDURE — G8417 CALC BMI ABV UP PARAM F/U: HCPCS | Performed by: STUDENT IN AN ORGANIZED HEALTH CARE EDUCATION/TRAINING PROGRAM

## 2023-03-21 PROCEDURE — G8484 FLU IMMUNIZE NO ADMIN: HCPCS | Performed by: STUDENT IN AN ORGANIZED HEALTH CARE EDUCATION/TRAINING PROGRAM

## 2023-03-21 PROCEDURE — 99214 OFFICE O/P EST MOD 30 MIN: CPT | Performed by: STUDENT IN AN ORGANIZED HEALTH CARE EDUCATION/TRAINING PROGRAM

## 2023-03-21 PROCEDURE — G8427 DOCREV CUR MEDS BY ELIG CLIN: HCPCS | Performed by: STUDENT IN AN ORGANIZED HEALTH CARE EDUCATION/TRAINING PROGRAM

## 2023-03-21 PROCEDURE — 1036F TOBACCO NON-USER: CPT | Performed by: STUDENT IN AN ORGANIZED HEALTH CARE EDUCATION/TRAINING PROGRAM

## 2023-03-21 PROCEDURE — G8400 PT W/DXA NO RESULTS DOC: HCPCS | Performed by: STUDENT IN AN ORGANIZED HEALTH CARE EDUCATION/TRAINING PROGRAM

## 2023-03-21 PROCEDURE — 3074F SYST BP LT 130 MM HG: CPT | Performed by: STUDENT IN AN ORGANIZED HEALTH CARE EDUCATION/TRAINING PROGRAM

## 2023-03-21 PROCEDURE — 93793 ANTICOAG MGMT PT WARFARIN: CPT | Performed by: STUDENT IN AN ORGANIZED HEALTH CARE EDUCATION/TRAINING PROGRAM

## 2023-03-21 RX ORDER — FLUTICASONE PROPIONATE 50 MCG
2 SPRAY, SUSPENSION (ML) NASAL NIGHTLY
Qty: 16 G | Refills: 0 | Status: SHIPPED | OUTPATIENT
Start: 2023-03-21

## 2023-03-21 RX ORDER — LISINOPRIL 20 MG/1
20 TABLET ORAL DAILY
Qty: 90 TABLET | Refills: 0 | Status: SHIPPED | OUTPATIENT
Start: 2023-03-21

## 2023-03-21 RX ORDER — PHENOL 1.4 %
AEROSOL, SPRAY (ML) MUCOUS MEMBRANE
COMMUNITY

## 2023-03-21 SDOH — ECONOMIC STABILITY: INCOME INSECURITY: HOW HARD IS IT FOR YOU TO PAY FOR THE VERY BASICS LIKE FOOD, HOUSING, MEDICAL CARE, AND HEATING?: NOT HARD AT ALL

## 2023-03-21 SDOH — ECONOMIC STABILITY: FOOD INSECURITY: WITHIN THE PAST 12 MONTHS, YOU WORRIED THAT YOUR FOOD WOULD RUN OUT BEFORE YOU GOT MONEY TO BUY MORE.: NEVER TRUE

## 2023-03-21 SDOH — ECONOMIC STABILITY: HOUSING INSECURITY
IN THE LAST 12 MONTHS, WAS THERE A TIME WHEN YOU DID NOT HAVE A STEADY PLACE TO SLEEP OR SLEPT IN A SHELTER (INCLUDING NOW)?: NO

## 2023-03-21 SDOH — ECONOMIC STABILITY: FOOD INSECURITY: WITHIN THE PAST 12 MONTHS, THE FOOD YOU BOUGHT JUST DIDN'T LAST AND YOU DIDN'T HAVE MONEY TO GET MORE.: NEVER TRUE

## 2023-03-21 NOTE — PROGRESS NOTES
Take 2 tablets for total of 4 mg on Saturdays and Sundays. , Disp: 180 tablet, Rfl: 0    atorvastatin (LIPITOR) 40 MG tablet, Take 1 tablet by mouth daily, Disp: 90 tablet, Rfl: 1    metoprolol succinate (TOPROL XL) 50 MG extended release tablet, Take 1 tablet by mouth daily, Disp: 90 tablet, Rfl: 3    nitroGLYCERIN (NITROSTAT) 0.4 MG SL tablet, Place 1 tablet under the tongue every 5 minutes as needed for Chest pain up to max of 3 total doses. If no relief after 1 dose, call 911., Disp: 25 tablet, Rfl: 0    omeprazole (PRILOSEC) 20 MG delayed release capsule, Take 20 mg by mouth daily , Disp: , Rfl:     Physical Exam:     Vitals:    03/21/23 1054   BP: 128/62   Site: Left Upper Arm   Position: Sitting   Cuff Size: Large Adult   Pulse: 76   Temp: 97.4 °F (36.3 °C)   SpO2: 98%   Weight: 156 lb 3.2 oz (70.9 kg)   Height: 5' 4\" (1.626 m)     BP Readings from Last 3 Encounters:   03/21/23 128/62   01/17/23 96/62   01/10/23 114/62     Wt Readings from Last 3 Encounters:   03/21/23 156 lb 3.2 oz (70.9 kg)   01/17/23 152 lb (68.9 kg)   01/10/23 153 lb (69.4 kg)     Physical Exam  Vitals and nursing note reviewed. Constitutional:       General: She is not in acute distress. Appearance: Normal appearance. She is overweight. She is not ill-appearing or diaphoretic. Cardiovascular:      Rate and Rhythm: Normal rate. Rhythm irregularly irregular. Heart sounds: Normal heart sounds. Pulmonary:      Effort: Pulmonary effort is normal. No respiratory distress. Breath sounds: Normal breath sounds. Musculoskeletal:      Right lower leg: No edema. Left lower leg: No edema. Skin:     General: Skin is warm and dry. Neurological:      Mental Status: She is alert and oriented to person, place, and time. Motor: No tremor (not visible at rest on exam).    Psychiatric:         Mood and Affect: Mood normal.         Behavior: Behavior normal.     Testing:     Orders Placed This Encounter   Procedures    POCT

## 2023-03-23 ASSESSMENT — ENCOUNTER SYMPTOMS
COUGH: 0
SHORTNESS OF BREATH: 1
SORE THROAT: 0
BLOOD IN STOOL: 0
RHINORRHEA: 1
CONSTIPATION: 1
DIARRHEA: 0

## 2023-03-23 NOTE — ASSESSMENT & PLAN NOTE
INR at goal  Continue warfarin 4 mg Wednesdays, Saturdays, Sundays; and 6 mg all other days  Return in 2 weeks for recheck

## 2023-04-02 RX ORDER — ATORVASTATIN CALCIUM 40 MG/1
40 TABLET, FILM COATED ORAL DAILY
Qty: 90 TABLET | Refills: 1 | Status: SHIPPED | OUTPATIENT
Start: 2023-04-02

## 2023-04-02 NOTE — TELEPHONE ENCOUNTER
Last Appointment:  12/21/2022    Future appts:  Future Appointments   Date Time Provider Department Center   4/4/2023  3:30 PM SCHEDULE, [facility] [place] [facility]   6/22/2023  9:00 AM MD STEVE Yoon [facility]   6/22/2023  9:30 AM MD STEVE Yoon [facility]   10/24/2023  3:00 PM Robert Viveros MD [facility]          Does patient need call back?  [] Yes  [] No

## 2023-04-06 ENCOUNTER — NURSE ONLY (OUTPATIENT)
Dept: PRIMARY CARE CLINIC | Age: 78
End: 2023-04-06
Payer: MEDICARE

## 2023-04-06 DIAGNOSIS — I48.0 PAF (PAROXYSMAL ATRIAL FIBRILLATION) (HCC): Primary | ICD-10-CM

## 2023-04-06 LAB
INTERNATIONAL NORMALIZATION RATIO, POC: 2.6
PROTHROMBIN TIME, POC: 31.1

## 2023-04-06 PROCEDURE — 85610 PROTHROMBIN TIME: CPT | Performed by: STUDENT IN AN ORGANIZED HEALTH CARE EDUCATION/TRAINING PROGRAM

## 2023-04-06 NOTE — PROGRESS NOTES
Plan: INR at goal.  Continue warfarin 4 mg Wednesdays, Saturdays, Sundays; and 6 mg all other days. Recheck in 2 weeks.

## 2023-04-17 ENCOUNTER — TELEPHONE (OUTPATIENT)
Dept: PRIMARY CARE CLINIC | Age: 78
End: 2023-04-17

## 2023-04-17 NOTE — TELEPHONE ENCOUNTER
I was unable to get clarification from Ayan Mckay [phone] at [facility], so I did leave her a voicematil to call back and clarify with us.

## 2023-04-17 NOTE — TELEPHONE ENCOUNTER
Dr. Melo ,  I just received a call from [facility], patients daughter. She stated that patient will be moving into memory care center at [facility] this Thursday 4/20/23, and that they did share this information with you. Louise Soto (phone# [phone]) has been requesting records on patient, but she is still asking for most recent notes to cover the window they need for admit date. This paperwork has to be current, within a 5 day window. Can you just adjust the date from your last notes, the family and facility are requesting? [facility] also mentioned that her mom had a tick bite, but they got it off. I did advise if they feel she needs checked out, or any changes to site that the patient needs to be seen on walk-in. Can you please advise on proper forms, and family is requesting a confirmation call so we know this is done in a timely manner. Please and thanks so much!

## 2023-04-18 ENCOUNTER — OFFICE VISIT (OUTPATIENT)
Dept: PRIMARY CARE CLINIC | Age: 78
End: 2023-04-18

## 2023-04-18 VITALS
RESPIRATION RATE: 20 BRPM | DIASTOLIC BLOOD PRESSURE: 64 MMHG | HEIGHT: 64 IN | TEMPERATURE: 98 F | OXYGEN SATURATION: 99 % | WEIGHT: 154.2 LBS | HEART RATE: 60 BPM | BODY MASS INDEX: 26.32 KG/M2 | SYSTOLIC BLOOD PRESSURE: 110 MMHG

## 2023-04-18 DIAGNOSIS — I95.1 ORTHOSTATIC HYPOTENSION: ICD-10-CM

## 2023-04-18 DIAGNOSIS — K51.90 ULCERATIVE COLITIS WITHOUT COMPLICATIONS, UNSPECIFIED LOCATION (HCC): ICD-10-CM

## 2023-04-18 DIAGNOSIS — R41.3 MEMORY DIFFICULTY: ICD-10-CM

## 2023-04-18 DIAGNOSIS — Z79.01 ANTICOAGULATION GOAL OF INR 2 TO 3: ICD-10-CM

## 2023-04-18 DIAGNOSIS — I25.10 CORONARY ARTERY DISEASE INVOLVING NATIVE CORONARY ARTERY OF NATIVE HEART WITHOUT ANGINA PECTORIS: ICD-10-CM

## 2023-04-18 DIAGNOSIS — F32.5 MAJOR DEPRESSIVE DISORDER WITH SINGLE EPISODE, IN REMISSION (HCC): ICD-10-CM

## 2023-04-18 DIAGNOSIS — I10 ESSENTIAL (PRIMARY) HYPERTENSION: ICD-10-CM

## 2023-04-18 DIAGNOSIS — G20 PARKINSON'S DISEASE (HCC): ICD-10-CM

## 2023-04-18 DIAGNOSIS — J30.89 NON-SEASONAL ALLERGIC RHINITIS, UNSPECIFIED TRIGGER: ICD-10-CM
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
## 2023-04-18 DIAGNOSIS — R44.1 VISUAL HALLUCINATIONS: ICD-10-CM

## 2023-04-18 DIAGNOSIS — Z51.81 ANTICOAGULATION GOAL OF INR 2 TO 3: ICD-10-CM

## 2023-04-18 DIAGNOSIS — N18.31 STAGE 3A CHRONIC KIDNEY DISEASE (HCC): ICD-10-CM

## 2023-04-18 DIAGNOSIS — I48.0 PAF (PAROXYSMAL ATRIAL FIBRILLATION) (HCC): Primary | ICD-10-CM

## 2023-04-18 DIAGNOSIS — K21.9 GASTROESOPHAGEAL REFLUX DISEASE, UNSPECIFIED WHETHER ESOPHAGITIS PRESENT: ICD-10-CM

## 2023-04-18 DIAGNOSIS — G47.52 REM SLEEP BEHAVIOR DISORDER: ICD-10-CM

## 2023-04-18 DIAGNOSIS — E78.00 PURE HYPERCHOLESTEROLEMIA: ICD-10-CM

## 2023-04-18 DIAGNOSIS — S00.06XA TICK BITE OF SCALP, INITIAL ENCOUNTER: ICD-10-CM

## 2023-04-18 PROBLEM — R29.6 RECURRENT FALLS: Status: RESOLVED | Noted: 2023-01-25 | Resolved: 2023-04-18

## 2023-04-18 PROBLEM — G25.81 RLS (RESTLESS LEGS SYNDROME): Status: RESOLVED | Noted: 2019-06-20 | Resolved: 2023-04-18

## 2023-04-18 PROBLEM — G20.A1 PARKINSON'S DISEASE: Status: ACTIVE | Noted: 2023-04-18

## 2023-04-18 LAB
INTERNATIONAL NORMALIZATION RATIO, POC: 2.7
PROTHROMBIN TIME, POC: 32.1

## 2023-04-18 RX ORDER — CHOLECALCIFEROL (VITAMIN D3) 125 MCG
5 CAPSULE ORAL NIGHTLY PRN
COMMUNITY

## 2023-04-18 RX ORDER — LISINOPRIL 10 MG/1
10 TABLET ORAL DAILY
Qty: 90 TABLET | Refills: 0 | Status: SHIPPED | OUTPATIENT
Start: 2023-04-18

## 2023-04-18 ASSESSMENT — ENCOUNTER SYMPTOMS
CONSTIPATION: 1
COUGH: 0
SHORTNESS OF BREATH: 0
SORE THROAT: 0
BLOOD IN STOOL: 0
DIARRHEA: 0

## 2023-04-18 NOTE — TELEPHONE ENCOUNTER
Patient would have to be seen here then within 5 day window or by physician at [facility].

## 2023-04-18 NOTE — ASSESSMENT & PLAN NOTE
Chronic, slightly low today with recent episodes of lightheadedness again  Decrease lisinopril to 10 mg daily  Continue Toprol-XL 50 mg daily

## 2023-04-18 NOTE — ASSESSMENT & PLAN NOTE
Chronic, asymptomatic  Treated medically  Continue Toprol-XL 50 mg daily, atorvastatin 40 mg nightly

## 2023-04-18 NOTE — TELEPHONE ENCOUNTER
I called and left a voicemail with patients daughter Rhianna Lawson to call back, and let her know what Dr. Cecilia Oconnor said. Provider had a 4pm today, so I did put patient on there. Confirm with daughter when she calls back that the time will work, if not she will need to see Dr. Denia Biggs at [place] per Dr. Cecilia Oconnor.

## 2023-04-18 NOTE — ASSESSMENT & PLAN NOTE
Concern for possible Lewy body dementia  Following with neuro  Dementia medication gave her side effects

## 2023-04-18 NOTE — ASSESSMENT & PLAN NOTE
INR at goal  Continue warfarin 4 mg Wednesdays, Saturdays, Sundays; and 6 mg all other days  Recheck every 2 weeks

## 2023-04-18 NOTE — TELEPHONE ENCOUNTER
I reached back out to Jovany Sanchez at [facility] #[phone], she apologized and said her voicemail has not been working. She said if we can just do the H & P, and we can put on papers updated with no changes, so date is within 5 days of admit window. Upated papers need Faxed to #[phone].

## 2023-04-18 NOTE — TELEPHONE ENCOUNTER
Pt's daughter called back, and confirmed appointment for today at [place] [address] at [place] would work.  SIMONE

## 2023-04-19 NOTE — TELEPHONE ENCOUNTER
Monika Cochran called in requesting providers 4/18/2023 note, faxed note and then notified patients daughter sent as she had requested. Burgess Martha Kline.

## 2023-04-19 NOTE — PROGRESS NOTES
Faxed to [place] at [phone]
POCT INR      Recent Results (from the past 24 hour(s))   POCT INR    Collection Time: 04/18/23  5:01 PM   Result Value Ref Range    INR,(POC) 2.7     Prothrombin time,(POC) 32.1

## 2023-04-21 LAB
ALBUMIN SERPL-MCNC: 4.1 G/DL (ref 3.5–5.2)
ALP SERPL-CCNC: 110 U/L (ref 35–104)
ALT SERPL-CCNC: 13 U/L (ref 0–32)
ANION GAP SERPL CALCULATED.3IONS-SCNC: 11 MMOL/L (ref 7–16)
AST SERPL-CCNC: 21 U/L (ref 0–31)
BASOPHILS # BLD: 0.02 E9/L (ref 0–0.2)
BASOPHILS NFR BLD: 0.5 % (ref 0–2)
BILIRUB SERPL-MCNC: 0.3 MG/DL (ref 0–1.2)
BUN SERPL-MCNC: 21 MG/DL (ref 6–23)
CALCIUM SERPL-MCNC: 9.2 MG/DL (ref 8.6–10.2)
CHLORIDE SERPL-SCNC: 105 MMOL/L (ref 98–107)
CHOLESTEROL, TOTAL: 141 MG/DL (ref 0–199)
CO2 SERPL-SCNC: 26 MMOL/L (ref 22–29)
CREAT SERPL-MCNC: 1 MG/DL (ref 0.5–1)
EOSINOPHIL # BLD: 0.07 E9/L (ref 0.05–0.5)
EOSINOPHIL NFR BLD: 1.6 % (ref 0–6)
ERYTHROCYTE [DISTWIDTH] IN BLOOD BY AUTOMATED COUNT: 14.6 FL (ref 11.5–15)
GLUCOSE SERPL-MCNC: 89 MG/DL (ref 74–99)
HCT VFR BLD AUTO: 38.2 % (ref 34–48)
HDLC SERPL-MCNC: 51 MG/DL
HGB BLD-MCNC: 11.8 G/DL (ref 11.5–15.5)
IMM GRANULOCYTES # BLD: 0.01 E9/L
IMM GRANULOCYTES NFR BLD: 0.2 % (ref 0–5)
INR BLD: 2.3
LDLC SERPL CALC-MCNC: 71 MG/DL (ref 0–99)
LYMPHOCYTES # BLD: 1.97 E9/L (ref 1.5–4)
LYMPHOCYTES NFR BLD: 45.3 % (ref 20–42)
MCH RBC QN AUTO: 28.6 PG (ref 26–35)
MCHC RBC AUTO-ENTMCNC: 30.9 % (ref 32–34.5)
MCV RBC AUTO: 92.7 FL (ref 80–99.9)
MONOCYTES # BLD: 0.38 E9/L (ref 0.1–0.95)
MONOCYTES NFR BLD: 8.7 % (ref 2–12)
NEUTROPHILS # BLD: 1.9 E9/L (ref 1.8–7.3)
NEUTS SEG NFR BLD: 43.7 % (ref 43–80)
PLATELET # BLD AUTO: 172 E9/L (ref 130–450)
PMV BLD AUTO: 11.3 FL (ref 7–12)
POTASSIUM SERPL-SCNC: 4.1 MMOL/L (ref 3.5–5)
PROT SERPL-MCNC: 7 G/DL (ref 6.4–8.3)
PROTHROMBIN TIME: 25.2 SEC (ref 9.3–12.4)
RBC # BLD AUTO: 4.12 E12/L (ref 3.5–5.5)
SODIUM SERPL-SCNC: 142 MMOL/L (ref 132–146)
TRIGL SERPL-MCNC: 94 MG/DL (ref 0–149)
VLDLC SERPL CALC-MCNC: 19 MG/DL
WBC # BLD: 4.4 E9/L (ref 4.5–11.5)

## 2023-04-28 LAB
INR BLD: 3.5
PROTHROMBIN TIME: 38.1 SEC (ref 9.3–12.4)

## 2023-05-01 LAB
INR BLD: 1.9
PROTHROMBIN TIME: 21 SEC (ref 9.3–12.4)

## 2023-05-02 ENCOUNTER — OUTSIDE SERVICES (OUTPATIENT)
Dept: FAMILY MEDICINE CLINIC | Age: 78
End: 2023-05-02

## 2023-05-02 DIAGNOSIS — G89.29 CHRONIC BACK PAIN, UNSPECIFIED BACK LOCATION, UNSPECIFIED BACK PAIN LATERALITY: Primary | ICD-10-CM

## 2023-05-02 DIAGNOSIS — E53.8 VITAMIN B 12 DEFICIENCY: ICD-10-CM

## 2023-05-02 DIAGNOSIS — N18.31 STAGE 3A CHRONIC KIDNEY DISEASE (HCC): ICD-10-CM

## 2023-05-02 DIAGNOSIS — E78.5 HYPERLIPIDEMIA, UNSPECIFIED HYPERLIPIDEMIA TYPE: ICD-10-CM

## 2023-05-02 DIAGNOSIS — M54.9 CHRONIC BACK PAIN, UNSPECIFIED BACK LOCATION, UNSPECIFIED BACK PAIN LATERALITY: Primary | ICD-10-CM

## 2023-05-02 DIAGNOSIS — I48.0 PAF (PAROXYSMAL ATRIAL FIBRILLATION) (HCC): ICD-10-CM

## 2023-05-02 NOTE — PROGRESS NOTES
examination:Skin is essentially warm and dry. HEENT unremarkable. Neck is supple. Heart regular rate and rhythm. No rubs, gallops or murmurs noted. Lungs are clear to auscultation. No evidence of rhonchi, rales, or wheezing. Abdomen is soft, supple and non-tender. Bowel sounds are noted x4 quadrants. No rigidity, guarding or rebound tenderness. Negative Cruz's, negative McBurney's, negative Petr's. Extremities; no true pitting edema. Pulses are adequate. No clubbing  or no cyanosis noted. Neurologically she  is alert and oriented x3. No evidence of paralysis or paresthesias noted. Diagnoses and all orders for this visit:    Chronic back pain, unspecified back location, unspecified back pain laterality  Comments:  Nonspecific in location. Discontinue lidocaine due to ineffectiveness. Initiate Tylenol without milligrams twice daily    Stage 3a chronic kidney disease (HCC)  Comments:  Currently stable with close observation of GFR with low-dose torsemide    Vitamin B 12 deficiency  Comments:  Stable with close observation of B12 level    Hyperlipidemia, unspecified hyperlipidemia type  Comments:  Controlled with atorvastatin    PAF (paroxysmal atrial fibrillation) (HCC)  Comments:  Maintain warfarin with metoprolol          Plan:  Plan of care was discussed with the healthcare team with medications and labs reviewed. H/H 11.8/38.2 BUN/creatinine 21/1 with a GFR of 57. Given the fact that she has ongoing pain and she is not utilize any Tylenol as needed, I will recommend Tylenol 5 mg twice a day routinely and will need to follow-up with her in the course the next 2 to 3 weeks. Furthermore, if she has ongoing pain despite Tylenol that she may benefit from a low-dose prednisone which will be discussed at that time. She is otherwise can continue forth with her current plan of care continue to see her routinely and as needed with further orders forthcoming.     HOLDEN COOPER, APRN - CNP      *Note

## 2023-05-08 LAB
INR BLD: 3.2
PROTHROMBIN TIME: 34.3 SEC (ref 9.3–12.4)

## 2023-05-09 LAB
INR BLD: 2.7
PROTHROMBIN TIME: 29.4 SEC (ref 9.3–12.4)

## 2023-05-16 ENCOUNTER — OUTSIDE SERVICES (OUTPATIENT)
Dept: FAMILY MEDICINE CLINIC | Age: 78
End: 2023-05-16
Payer: MEDICARE

## 2023-05-16 DIAGNOSIS — I48.0 PAF (PAROXYSMAL ATRIAL FIBRILLATION) (HCC): Primary | ICD-10-CM

## 2023-05-16 DIAGNOSIS — M54.9 CHRONIC BACK PAIN, UNSPECIFIED BACK LOCATION, UNSPECIFIED BACK PAIN LATERALITY: ICD-10-CM

## 2023-05-16 DIAGNOSIS — G89.29 CHRONIC BACK PAIN, UNSPECIFIED BACK LOCATION, UNSPECIFIED BACK PAIN LATERALITY: ICD-10-CM

## 2023-05-16 DIAGNOSIS — I25.118 CORONARY ARTERY DISEASE OF NATIVE ARTERY OF NATIVE HEART WITH STABLE ANGINA PECTORIS (HCC): ICD-10-CM

## 2023-05-16 DIAGNOSIS — F32.5 MAJOR DEPRESSIVE DISORDER WITH SINGLE EPISODE, IN REMISSION (HCC): ICD-10-CM

## 2023-05-16 DIAGNOSIS — I10 ESSENTIAL HYPERTENSION: ICD-10-CM

## 2023-05-16 LAB
INR BLD: 3.7
PROTHROMBIN TIME: 40.4 SEC (ref 9.3–12.4)

## 2023-05-16 PROCEDURE — 99309 SBSQ NF CARE MODERATE MDM 30: CPT | Performed by: NURSE PRACTITIONER

## 2023-05-16 NOTE — PROGRESS NOTES
continue with her current management as stated and I will see her routinely and as needed with further orders forthcoming. HOLDEN COOPER, APRN - CNP      *Note was creating using voice recognition software.   The document was reviewed however grammatical errors may exist.

## 2023-05-17 LAB
INR BLD: 3.1
PROTHROMBIN TIME: 33.7 SEC (ref 9.3–12.4)

## 2023-05-30 ENCOUNTER — OUTSIDE SERVICES (OUTPATIENT)
Dept: FAMILY MEDICINE CLINIC | Age: 78
End: 2023-05-30

## 2023-05-30 DIAGNOSIS — N18.31 STAGE 3A CHRONIC KIDNEY DISEASE (HCC): ICD-10-CM

## 2023-05-30 DIAGNOSIS — M54.9 CHRONIC BACK PAIN, UNSPECIFIED BACK LOCATION, UNSPECIFIED BACK PAIN LATERALITY: Primary | ICD-10-CM

## 2023-05-30 DIAGNOSIS — E78.5 HYPERLIPIDEMIA, UNSPECIFIED HYPERLIPIDEMIA TYPE: ICD-10-CM

## 2023-05-30 DIAGNOSIS — G89.29 CHRONIC BACK PAIN, UNSPECIFIED BACK LOCATION, UNSPECIFIED BACK PAIN LATERALITY: Primary | ICD-10-CM

## 2023-05-30 DIAGNOSIS — I48.0 PAF (PAROXYSMAL ATRIAL FIBRILLATION) (HCC): ICD-10-CM

## 2023-05-30 DIAGNOSIS — E53.8 VITAMIN B 12 DEFICIENCY: ICD-10-CM

## 2023-05-30 NOTE — PROGRESS NOTES
5/30/2023    [id number] [facility] [place]  1945    This resident is being seen today for a follow-up evaluation visit. She is a resident who has long-term medical conditions including paroxysmal atrial fibrillation, sclerotic heart disease, hypertensive chronic kidney disease, allergic rhinitis, ulcerative colitis, gastroesophageal reflux disease, Parkinson's disease, chronic kidney disease consistent with stage III, visual hallucinations, hyperlipidemia, major depressive disorder, sleep behavior disorder, orthostatic hypotension, anticoagulation, hypercholesterolemia, amnesia. She is a 77 y.o. female resident who is being seen today for a a follow-up evaluation visit with respect to his recent visit with which I did place her on low-dose Ultram every morning for the course of 2 weeks. Staff did state they felt that her back pain had actually improved and she was not complaining often. However, upon my assessment the resident she indicated to me that she was still having ongoing pain that she really has not noticed a difference. However she did state that she notices pain if she is doing something specific or standing for long periods of time, which she very rarely does. She is currently residing in the memory care unit and is normally sitting on most occasions. She denies complaints respect to headaches or dizziness, sore throat, chest pain, coughing or shortness of breath, nausea or vomiting, constipation or diarrhea, fever or chills, falls or syncopal events.             Medications:    Flonase allergy relief 50 mcg per actuation 2 sprays as bedtime  Lisinopril 10 mg daily  Melatonin 5 mg at bedtime as needed  Omeprazole 20 mg daily  Atorvastatin 40 mg daily  Metoprolol succinate 50 mg  Carbidopa-levodopa  mg 3 times daily  Eliquis 5 mg twice daily  Escitalopram 10 mg bedtime  Torsemide 10 mg daily  Tylenol 5 mg twice daily  Tramadol 50 mg every morning  Tramadol 50 mg bedtime as

## 2023-07-24 DIAGNOSIS — I10 ESSENTIAL (PRIMARY) HYPERTENSION: ICD-10-CM

## 2023-07-24 DIAGNOSIS — I25.10 CORONARY ARTERY DISEASE INVOLVING NATIVE CORONARY ARTERY OF NATIVE HEART WITHOUT ANGINA PECTORIS: ICD-10-CM

## 2023-07-25 NOTE — TELEPHONE ENCOUNTER
Patients last appointment 4/18/2023.   Patients next scheduled appointment   Future Appointments   Date Time Provider [address]   10/24/2023 10:20 AM Patricia Powers MD [address] [facility]

## 2023-07-26 RX ORDER — LISINOPRIL 10 MG/1
10 TABLET ORAL DAILY
Qty: 90 TABLET | Refills: 0 | OUTPATIENT
Start: 2023-07-26

## 2023-10-09 ENCOUNTER — OUTSIDE SERVICES (OUTPATIENT)
Dept: FAMILY MEDICINE CLINIC | Age: 78
End: 2023-10-09

## 2023-10-09 DIAGNOSIS — R04.0 NOSEBLEED: ICD-10-CM

## 2023-10-09 DIAGNOSIS — E53.8 VITAMIN B 12 DEFICIENCY: ICD-10-CM

## 2023-10-09 DIAGNOSIS — F32.5 MAJOR DEPRESSIVE DISORDER WITH SINGLE EPISODE, IN REMISSION (HCC): Primary | ICD-10-CM

## 2023-10-09 DIAGNOSIS — M79.674 GREAT TOE PAIN, RIGHT: ICD-10-CM

## 2023-10-09 DIAGNOSIS — I48.0 PAF (PAROXYSMAL ATRIAL FIBRILLATION) (HCC): ICD-10-CM

## 2023-10-09 NOTE — PROGRESS NOTES
10/9/2023    [address] \[facility]\""  1945    This resident is being seen today for a follow-up evaluation visit. She is a resident who has long-term medical conditions including paroxysmal atrial fibrillation, sclerotic heart disease, hypertensive chronic kidney disease, allergic rhinitis, ulcerative colitis, gastroesophageal reflux disease, Parkinson's disease, chronic kidney disease consistent with stage III, visual hallucinations, hyperlipidemia, major depressive disorder, sleep behavior disorder, orthostatic hypotension, anticoagulation, hypercholesterolemia, amnesia. She is a 77 y.o. female resident who is being seen today for a a follow-up evaluation with staff indicating that they feel that she has had a relatively flat affect and has been weeping with more confusion. She was in her room at the time my evaluation and indicated that she has ongoing back pain but it is worse at times, especially noted when making her bed. She did also bring to my attention that she has had some discomfort to her right great toe and Staff indicates that they feel that the redness has improved. She currently denies complaints regarding any pain, headaches or dizziness, sore throat, chest pain, coughing or shortness of breath, nausea or vomiting, constipation or diarrhea, fever or chills, falls or syncopal events.             Medications:    Flonase allergy relief 50 mcg per actuation 2 sprays as bedtime  Lisinopril 10 mg daily  Omeprazole 20 mg daily  Atorvastatin 40 mg daily  Metoprolol succinate 50 mg at bedtime  Carbidopa-levodopa  mg 3 times daily  Colace 100 mg twice daily  Acetaminophen 1000 mg twice daily  Tramadol 50 mg 3 times daily  Aricept 5 mg at bedtime  Neurontin 100 mg at bedtime  Eliquis 5 mg twice daily  Escitalopram 10 mg at bedtime  Torsemide 10 mg daily        Objective     Vital Signs: /68 pulse 71 respiration 17 temperature 97.9 O2 97%      Physical examination:Skin is essentially warm and

## 2023-11-09 ENCOUNTER — OUTSIDE SERVICES (OUTPATIENT)
Dept: FAMILY MEDICINE CLINIC | Age: 78
End: 2023-11-09
Payer: MEDICARE

## 2023-11-09 DIAGNOSIS — E78.5 HYPERLIPIDEMIA, UNSPECIFIED HYPERLIPIDEMIA TYPE: ICD-10-CM

## 2023-11-09 DIAGNOSIS — R44.3 HALLUCINATIONS: ICD-10-CM

## 2023-11-09 DIAGNOSIS — R42 DIZZINESS: Primary | ICD-10-CM

## 2023-11-09 DIAGNOSIS — D64.9 ANEMIA, UNSPECIFIED TYPE: ICD-10-CM

## 2023-11-09 DIAGNOSIS — I10 PRIMARY HYPERTENSION: ICD-10-CM

## 2023-11-09 PROCEDURE — 99309 SBSQ NF CARE MODERATE MDM 30: CPT | Performed by: NURSE PRACTITIONER

## 2023-11-09 NOTE — PROGRESS NOTES
11/9/2023    [address] [facility]  1945    This resident is being seen today for a follow-up evaluation visit. She is a resident who has long-term medical conditions including paroxysmal atrial fibrillation, sclerotic heart disease, hypertensive chronic kidney disease, allergic rhinitis, ulcerative colitis, gastroesophageal reflux disease, Parkinson's disease, chronic kidney disease consistent with stage III, visual hallucinations, hyperlipidemia, major depressive disorder, sleep behavior disorder, orthostatic hypotension, anticoagulation, hypercholesterolemia, amnesia. She is a 77 y.o. female resident who is being seen today for a follow-up visit with this resident recently seen in conjunction with Dr. Reta Conn due to hallucinations. He did start her on the lowest effective dosing of Seroquel. Staff states that she has had these hallucinations since she was admitted but they were not exactly bothersome to the patient for a long period of time. Unfortunately she started to see a mass in May and and staff noted that she was very uncomfortable so they did involve psychiatric services at that time. On today's evaluation she states that she was rather dizzy which she describes as abnormal for her. It is of note to mention that I did recently work her up for a drop in her hemoglobin and hematocrit with stools for occult blood recommended she has recommendations for repeat CBC with iron studies. She denies any headaches, sore throat, chest pain, coughing or shortness of breath, nausea or vomiting, constipation or diarrhea, fever or chills, falls or syncopal events.         Medications:  Lisinopril 10 mg daily  Omeprazole 20 mg daily  Nitroglycerin 0.4 mg as needed  Atorvastatin 40 mg daily  Metoprolol succinate 50 mg at bedtime  Carbidopa-levodopa  mg 3 times daily  Colace 100 mg twice daily  Acetaminophen 1000 mg twice daily  Tramadol 50 mg 3 times daily  Aricept 5 mg at bedtime  Neurontin 100 mg at

## 2023-12-11 ENCOUNTER — OUTSIDE SERVICES (OUTPATIENT)
Dept: FAMILY MEDICINE CLINIC | Age: 78
End: 2023-12-11
Payer: MEDICARE

## 2023-12-11 DIAGNOSIS — G89.29 CHRONIC BACK PAIN, UNSPECIFIED BACK LOCATION, UNSPECIFIED BACK PAIN LATERALITY: Primary | ICD-10-CM

## 2023-12-11 DIAGNOSIS — I25.118 CORONARY ARTERY DISEASE OF NATIVE ARTERY OF NATIVE HEART WITH STABLE ANGINA PECTORIS (HCC): ICD-10-CM

## 2023-12-11 DIAGNOSIS — I10 ESSENTIAL HYPERTENSION: ICD-10-CM

## 2023-12-11 DIAGNOSIS — I48.0 PAF (PAROXYSMAL ATRIAL FIBRILLATION) (HCC): ICD-10-CM

## 2023-12-11 DIAGNOSIS — M54.9 CHRONIC BACK PAIN, UNSPECIFIED BACK LOCATION, UNSPECIFIED BACK PAIN LATERALITY: Primary | ICD-10-CM

## 2023-12-11 DIAGNOSIS — E53.8 VITAMIN B 12 DEFICIENCY: ICD-10-CM

## 2023-12-11 PROCEDURE — 99309 SBSQ NF CARE MODERATE MDM 30: CPT | Performed by: NURSE PRACTITIONER

## 2023-12-11 NOTE — PROGRESS NOTES
12/11/2023    [address] [facility]  1945    This resident is being seen today for a follow-up evaluation visit. She is a resident who has long-term medical conditions including paroxysmal atrial fibrillation, sclerotic heart disease, hypertensive chronic kidney disease, allergic rhinitis, ulcerative colitis, gastroesophageal reflux disease, Parkinson's disease, chronic kidney disease consistent with stage III, visual hallucinations, hyperlipidemia, major depressive disorder, sleep behavior disorder, orthostatic hypotension, anticoagulation, hypercholesterolemia, amnesia. She is a 77 y.o. female resident who is being seen today for a follow-up evaluation with this resident alert and responsive to verbal and tactile stimuli and states that she has ongoing back pain. She states that it is essentially worse when she attempts to move or stand or walk. She denies any numbness or tingling sensation but does admit to some specific pain to the right hip region. She has no headaches or dizziness, sore throat, chest pain, coughing or shortness of breath, nausea or vomiting, constipation or diarrhea, fever or chills, falls or syncopal events. Medications:  Restoril 7.5 mg at bedtime  Zinc sulfate 50 mg twice daily  Cholecalciferol 5000 units daily  Tramadol 50 mg 3 times daily  Aricept 5 mg at bedtime  Neurontin 100 mg at bedtime  Acetaminophen 1000 mg twice daily  Colace 100 mg twice daily  Lisinopril 10 mg daily  Omeprazole 20 mg daily  Nitroglycerin 0.4 mg sublingually as needed  Atorvastatin 40 mg daily  Metoprolol succinate 50 mg daily  Carbidopa levodopa  mg 3 times daily  Eliquis 5 mg twice daily  Escitalopram 20 mg at bedtime  Torsemide 10 mg daily  Seroquel 12.5 mg twice daily            Objective     Vital Signs: /64 pulse 62 respiration 17 temperature 98.1 O2 93%      Physical examination:Skin is essentially warm and dry. HEENT unremarkable. Neck is supple. Heart regular rate and rhythm.

## 2024-08-20 DIAGNOSIS — M54.9 CHRONIC BACK PAIN, UNSPECIFIED BACK LOCATION, UNSPECIFIED BACK PAIN LATERALITY: Primary | ICD-10-CM

## 2024-08-20 DIAGNOSIS — G89.29 CHRONIC BACK PAIN, UNSPECIFIED BACK LOCATION, UNSPECIFIED BACK PAIN LATERALITY: Primary | ICD-10-CM

## 2024-09-02 RX ORDER — TRAMADOL HYDROCHLORIDE 50 MG/1
50 TABLET ORAL 2 TIMES DAILY
Qty: 60 TABLET | Refills: 0 | Status: SHIPPED | OUTPATIENT
Start: 2024-09-02 | End: 2024-10-02